# Patient Record
Sex: MALE | Race: WHITE | NOT HISPANIC OR LATINO | Employment: FULL TIME | URBAN - METROPOLITAN AREA
[De-identification: names, ages, dates, MRNs, and addresses within clinical notes are randomized per-mention and may not be internally consistent; named-entity substitution may affect disease eponyms.]

---

## 2017-03-28 ENCOUNTER — OFFICE VISIT (OUTPATIENT)
Dept: URGENT CARE | Facility: RETAIL CLINIC | Age: 37
End: 2017-03-28

## 2017-03-28 VITALS
TEMPERATURE: 97.7 F | DIASTOLIC BLOOD PRESSURE: 75 MMHG | SYSTOLIC BLOOD PRESSURE: 139 MMHG | HEART RATE: 74 BPM | OXYGEN SATURATION: 97 %

## 2017-03-28 DIAGNOSIS — R05.9 COUGH: ICD-10-CM

## 2017-03-28 DIAGNOSIS — J01.90 ACUTE SINUSITIS WITH COEXISTING CONDITION REQUIRING PROPHYLACTIC TREATMENT: ICD-10-CM

## 2017-03-28 DIAGNOSIS — J02.9 ACUTE PHARYNGITIS, UNSPECIFIED ETIOLOGY: Primary | ICD-10-CM

## 2017-03-28 LAB — S PYO AG THROAT QL IA.RAPID: NORMAL

## 2017-03-28 PROCEDURE — 87081 CULTURE SCREEN ONLY: CPT | Performed by: NURSE PRACTITIONER

## 2017-03-28 PROCEDURE — 87880 STREP A ASSAY W/OPTIC: CPT | Mod: QW | Performed by: NURSE PRACTITIONER

## 2017-03-28 PROCEDURE — 99203 OFFICE O/P NEW LOW 30 MIN: CPT | Performed by: NURSE PRACTITIONER

## 2017-03-28 RX ORDER — PREDNISONE 20 MG/1
20-40 TABLET ORAL DAILY
Qty: 10 TABLET | Refills: 0 | Status: SHIPPED | OUTPATIENT
Start: 2017-03-28 | End: 2017-04-02

## 2017-03-28 NOTE — NURSING NOTE
Chief Complaint   Patient presents with     Pharyngitis     x 5 days     Ear Problem     Nasal Congestion     Chills     Generalized Body Aches       Initial /75  Pulse 74  Temp 97.7  F (36.5  C) (Oral)  SpO2 97% Estimated body mass index is 52.22 kg/(m^2) as calculated from the following:    Height as of 5/16/12: 6' (1.829 m).    Weight as of 5/16/12: 385 lb (174.6 kg).  Medication Reconciliation: complete   Deana Neville

## 2017-03-28 NOTE — PROGRESS NOTES
"[facility] clinic note    SUBJECTIVE:  Dontrell Schulz is a 37 year old male who presents to [facility] clinic with chief complaint of sore throat.    Onset of symptoms was 5 day(s) ago.    Course of illness: gradual onset and worsening.    Severity moderate  Course of illness:  Current and Associated symptoms: chills, sweats, felt a little dizzy, nasal congestion, rhinorrhea, cough \"junk\", ear pain right, sore throat, facial pain/pressure, hoarse voice, headache, myalgias, malaise and poor sleep  Treatment measures tried at home include OTC meds.  Predisposing factors include tobacco abuse.    Current Outpatient Prescriptions   Medication     Pseudoephedrine-Naproxen Na (SINUS & COLD-D PO)     LISINOPRIL PO     AMLODIPINE BESYLATE PO     Acetaminophen (TYLENOL PO)     No current facility-administered medications for this visit.      PAST MEDICAL HISTORY:   Past Medical History:   Diagnosis Date     Hypertension        PAST SURGICAL HISTORY:   Past Surgical History:   Procedure Laterality Date     EYE SURGERY      trauma related - Retinal injury       FAMILY HISTORY: No family history on file.    SOCIAL HISTORY:   Social History   Substance Use Topics     Smoking status: Current Every Day Smoker     Packs/day: 1.00     Smokeless tobacco: Not on file     Alcohol use Yes      Comment: occas       ROS:  Review of systems negative except as stated above.    OBJECTIVE:   Vitals:    03/28/17 0934   BP: 139/75   Pulse: 74   Temp: 97.7  F (36.5  C)   TempSrc: Oral   SpO2: 97%     GENERAL APPEARANCE: alert, moderate distress, cooperative and over weight  EYES: EOMI,  PERRL, conjunctiva clear  HENT: ear canals and TM's mostly normal.  Nose congested.  Pharynx erythematous noted.  NECK: bilateral anterior cervical adenopathy  RESP: lungs clear to auscultation - no rales, rhonchi or wheezes  CV: regular rates and rhythm, normal S1 S2, no murmur noted  ABDOMEN:  soft, nontender, no " HSM or masses and bowel sounds normal  SKIN: no suspicious lesions or rashes    Rapid Strep test is negative; await throat culture results.    ASSESSMENT:     Acute pharyngitis, unspecified etiology  Acute sinusitis with coexisting condition requiring prophylactic treatment  Cough      PLAN:   Outpatient Encounter Prescriptions as of 3/28/2017   Medication Sig Dispense Refill     Pseudoephedrine-Naproxen Na (SINUS & COLD-D PO)        LISINOPRIL PO Take 40 mg by mouth daily.       AMLODIPINE BESYLATE PO Take 10 mg by mouth daily.       Acetaminophen (TYLENOL PO) Take 1,000 mg by mouth every 6 hours as needed Reported on 3/28/2017       [DISCONTINUED] IBUPROFEN PO Take 600 mg by mouth every 6 hours as needed.       [DISCONTINUED] HYDROcodone-acetaminophen (VICODIN) 5-500 MG per tablet Take 1-2 tablets by mouth every 6 hours as needed for pain. 20 tablet 0     No facility-administered encounter medications on file as of 3/28/2017.      If not improving Follow up at:  [facility] [phone]  Encourage good hydration (mainly water), may drink tea /c honey, warm chicken broth to sooth throat.  Soft foods may be preferred for several days.  Symptomatic treatment with warm Na+ H2O gargles, OTC analgesic, etc. discussed.   Strep culture pending.   Dontrell Schulz told positive cultures called only.  Rest as needed.  Follow-up with primary care provider if not improving.    If difficulty breathing or swallowing be seen immediately in the ED.    Pedro Valdez MSN, APRN, Family NP-C  [facility]

## 2017-03-28 NOTE — MR AVS SNAPSHOT
"              After Visit Summary   3/28/2017    Dontrell Schulz    MRN: [id number]           Patient Information     Date Of Birth          1980        Visit Information        Provider Department      3/28/2017 9:30 AM Pedro Valdez APRN CNP [facility]        Today's Diagnoses     Acute pharyngitis, unspecified etiology    -  1    Acute sinusitis with coexisting condition requiring prophylactic treatment        Cough           Follow-ups after your visit        Who to contact     You can reach your care team any time of the day by calling [phone].  Notification of test results:  If you have an abnormal lab result, we will notify you by phone as soon as possible.         Additional Information About Your Visit        MyChart Information     [employer] lets you send messages to your doctor, view your test results, renew your prescriptions, schedule appointments and more. To sign up, go to [email or web address] . Click on \"Log in\" on the left side of the screen, which will take you to the Welcome page. Then click on \"Sign up Now\" on the right side of the page.     You will be asked to enter the access code listed below, as well as some personal information. Please follow the directions to create your username and password.     Your access code is: [email or web address]  Expires: 2017 10:13 AM     Your access code will  in 90 days. If you need help or a new code, please call your [place] clinic or [phone].        Care EveryWhere ID     This is your [facility] EveryWhere ID. This could be used by other organizations to access your [place] medical records  [id number]        Your Vitals Were     Pulse Temperature Pulse Oximetry             74 97.7  F (36.5  C) (Oral) 97%          Blood Pressure from Last 3 Encounters:   17 139/75   12 148/85    Weight from Last 3 Encounters:   12 (!) 385 lb (174.6 kg)              We Performed the Following     BETA STREP GROUP " A R/O CULTURE     RAPID STREP SCREEN          Today's Medication Changes          These changes are accurate as of: 3/28/17 10:13 AM.  If you have any questions, ask your nurse or doctor.               Start taking these medicines.        Dose/Directions    amoxicillin-clavulanate 875-125 MG per tablet   Commonly known as:  AUGMENTIN   Used for:  Acute sinusitis with coexisting condition requiring prophylactic treatment   Started by:  Pedro Valdez APRN CNP        Dose:  1 tablet   Take 1 tablet by mouth 2 times daily for 10 days   Quantity:  20 tablet   Refills:  0       lidocaine 2 % solution   Commonly known as:  XYLOCAINE   Used for:  Acute pharyngitis, unspecified etiology   Started by:  Pedro Valdez APRN CNP        5 to 15 ml, At back of throat slowly swish and swallow or spit every 3-8 hours as needed; max 8 doses/24 hour period   Quantity:  100 mL   Refills:  0       predniSONE 20 MG tablet   Commonly known as:  DELTASONE   Used for:  Acute sinusitis with coexisting condition requiring prophylactic treatment, Cough   Started by:  Pedro Valdez APRN CNP        Dose:  20-40 mg   Take 1-2 tablets (20-40 mg) by mouth daily for 5 days   Quantity:  10 tablet   Refills:  0            Where to get your medicines      These medications were sent to [address]  [address] [facility] [address]     Phone:  [phone]     amoxicillin-clavulanate 875-125 MG per tablet    lidocaine 2 % solution    predniSONE 20 MG tablet                Primary Care Provider Office Phone #    [facility] [phone]       No address on file        Thank you!     Thank you for choosing [facility]  for your care. Our goal is always to provide you with excellent care. Hearing back from our patients is one way we can continue to improve our services. Please take a few minutes to complete the written survey that you may receive in the mail  after your visit with us. Thank you!             Your Updated Medication List - Protect others around you: Learn how to safely use, store and throw away your medicines at www.disposemymeds.org.          This list is accurate as of: 3/28/17 10:13 AM.  Always use your most recent med list.                   Brand Name Dispense Instructions for use    AMLODIPINE BESYLATE PO      Take 10 mg by mouth daily.       amoxicillin-clavulanate 875-125 MG per tablet    AUGMENTIN    20 tablet    Take 1 tablet by mouth 2 times daily for 10 days       lidocaine 2 % solution    XYLOCAINE    100 mL    5 to 15 ml, At back of throat slowly swish and swallow or spit every 3-8 hours as needed; max 8 doses/24 hour period       LISINOPRIL PO      Take 40 mg by mouth daily.       predniSONE 20 MG tablet    DELTASONE    10 tablet    Take 1-2 tablets (20-40 mg) by mouth daily for 5 days       SINUS & COLD-D PO          TYLENOL PO      Take 1,000 mg by mouth every 6 hours as needed Reported on 3/28/2017

## 2017-03-30 LAB — BETA STREP CONFIRM: NORMAL

## 2017-04-03 ENCOUNTER — TELEPHONE (OUTPATIENT)
Dept: URGENT CARE | Facility: RETAIL CLINIC | Age: 37
End: 2017-04-03

## 2017-04-03 NOTE — LETTER
[facility] [place]  [address] [facility] [address]  Phone: [phone]    April 3, 2017        Dontrell Schulz  [address] [facility] [address]          To whom it may concern:    RE: Dontrell Schulz    Patient was seen and treated 3/28/2017 at our clinic and missed work.    Please contact me for questions or concerns.      Sincerely,        Pedro Valdez MSN, APRN, Family NP-C  [facility]

## 2017-04-03 NOTE — TELEPHONE ENCOUNTER
Reason for Call:  Other Note for employer    Detailed comments: Please advise if patient would be able to  a note for employer that patient was seen in [facility], please advise patient    Phone Number Patient can be reached at: Home number on file [phone]    Best Time:     Can we leave a detailed message on this number? YES    Call taken on 4/3/2017 at 10:50 AM by Tanya Gomez

## 2017-10-12 ENCOUNTER — TELEPHONE (OUTPATIENT)
Dept: FAMILY MEDICINE | Facility: CLINIC | Age: 37
End: 2017-10-12

## 2017-10-12 ENCOUNTER — OFFICE VISIT (OUTPATIENT)
Dept: FAMILY MEDICINE | Facility: OTHER | Age: 37
End: 2017-10-12
Payer: COMMERCIAL

## 2017-10-12 ENCOUNTER — RADIANT APPOINTMENT (OUTPATIENT)
Dept: GENERAL RADIOLOGY | Facility: OTHER | Age: 37
End: 2017-10-12
Attending: PHYSICIAN ASSISTANT
Payer: COMMERCIAL

## 2017-10-12 VITALS
BODY MASS INDEX: 44.1 KG/M2 | HEART RATE: 80 BPM | RESPIRATION RATE: 16 BRPM | SYSTOLIC BLOOD PRESSURE: 130 MMHG | HEIGHT: 71 IN | WEIGHT: 315 LBS | TEMPERATURE: 98 F | OXYGEN SATURATION: 96 % | DIASTOLIC BLOOD PRESSURE: 60 MMHG

## 2017-10-12 DIAGNOSIS — G62.9 POLYNEUROPATHY: Primary | ICD-10-CM

## 2017-10-12 DIAGNOSIS — R20.2 PARESTHESIAS: ICD-10-CM

## 2017-10-12 LAB
BASOPHILS # BLD AUTO: 0 10E9/L (ref 0–0.2)
BASOPHILS NFR BLD AUTO: 0.3 %
CRP SERPL-MCNC: <2.9 MG/L (ref 0–8)
DIFFERENTIAL METHOD BLD: NORMAL
EOSINOPHIL # BLD AUTO: 0.4 10E9/L (ref 0–0.7)
EOSINOPHIL NFR BLD AUTO: 5.2 %
ERYTHROCYTE [DISTWIDTH] IN BLOOD BY AUTOMATED COUNT: 12.4 % (ref 10–15)
HBA1C MFR BLD: 5.2 % (ref 4.3–6)
HCT VFR BLD AUTO: 45.1 % (ref 40–53)
HGB BLD-MCNC: 15.5 G/DL (ref 13.3–17.7)
LYMPHOCYTES # BLD AUTO: 2 10E9/L (ref 0.8–5.3)
LYMPHOCYTES NFR BLD AUTO: 26.7 %
MCH RBC QN AUTO: 30.2 PG (ref 26.5–33)
MCHC RBC AUTO-ENTMCNC: 34.4 G/DL (ref 31.5–36.5)
MCV RBC AUTO: 88 FL (ref 78–100)
MONOCYTES # BLD AUTO: 0.6 10E9/L (ref 0–1.3)
MONOCYTES NFR BLD AUTO: 8 %
NEUTROPHILS # BLD AUTO: 4.5 10E9/L (ref 1.6–8.3)
NEUTROPHILS NFR BLD AUTO: 59.8 %
PLATELET # BLD AUTO: 276 10E9/L (ref 150–450)
RBC # BLD AUTO: 5.14 10E12/L (ref 4.4–5.9)
TSH SERPL DL<=0.005 MIU/L-ACNC: 0.6 MU/L (ref 0.4–4)
VIT B12 SERPL-MCNC: 402 PG/ML (ref 193–986)
WBC # BLD AUTO: 7.5 10E9/L (ref 4–11)

## 2017-10-12 PROCEDURE — 86140 C-REACTIVE PROTEIN: CPT | Performed by: PHYSICIAN ASSISTANT

## 2017-10-12 PROCEDURE — 36415 COLL VENOUS BLD VENIPUNCTURE: CPT | Performed by: PHYSICIAN ASSISTANT

## 2017-10-12 PROCEDURE — 99214 OFFICE O/P EST MOD 30 MIN: CPT | Performed by: PHYSICIAN ASSISTANT

## 2017-10-12 PROCEDURE — 82607 VITAMIN B-12: CPT | Performed by: PHYSICIAN ASSISTANT

## 2017-10-12 PROCEDURE — 83036 HEMOGLOBIN GLYCOSYLATED A1C: CPT | Performed by: PHYSICIAN ASSISTANT

## 2017-10-12 PROCEDURE — 85025 COMPLETE CBC W/AUTO DIFF WBC: CPT | Performed by: PHYSICIAN ASSISTANT

## 2017-10-12 PROCEDURE — 72100 X-RAY EXAM L-S SPINE 2/3 VWS: CPT

## 2017-10-12 PROCEDURE — 84443 ASSAY THYROID STIM HORMONE: CPT | Performed by: PHYSICIAN ASSISTANT

## 2017-10-12 ASSESSMENT — PAIN SCALES - GENERAL: PAINLEVEL: NO PAIN (0)

## 2017-10-12 NOTE — LETTER
October 16, 2017      Dontrell Schulz  [address] [facility] [address]        Dear ,    We are writing to inform you of your test results.    Lab work has returned and shows normal vitamin B12 level, normal thyroid function, normal inflammatory marker and blood cell counts.     Resulted Orders   Vitamin B12   Result Value Ref Range    Vitamin B12 402 193 - 986 pg/mL   CBC with platelets and differential   Result Value Ref Range    WBC 7.5 4.0 - 11.0 10e9/L    RBC Count 5.14 4.4 - 5.9 10e12/L    Hemoglobin 15.5 13.3 - 17.7 g/dL    Hematocrit 45.1 40.0 - 53.0 %    MCV 88 78 - 100 fl    MCH 30.2 26.5 - 33.0 pg    MCHC 34.4 31.5 - 36.5 g/dL    RDW 12.4 10.0 - 15.0 %    Platelet Count 276 150 - 450 10e9/L    Diff Method Automated Method     % Neutrophils 59.8 %    % Lymphocytes 26.7 %    % Monocytes 8.0 %    % Eosinophils 5.2 %    % Basophils 0.3 %    Absolute Neutrophil 4.5 1.6 - 8.3 10e9/L    Absolute Lymphocytes 2.0 0.8 - 5.3 10e9/L    Absolute Monocytes 0.6 0.0 - 1.3 10e9/L    Absolute Eosinophils 0.4 0.0 - 0.7 10e9/L    Absolute Basophils 0.0 0.0 - 0.2 10e9/L   CRP, inflammation   Result Value Ref Range    CRP Inflammation <2.9 0.0 - 8.0 mg/L   TSH with free T4 reflex   Result Value Ref Range    TSH 0.60 0.40 - 4.00 mU/L   Hemoglobin A1c   Result Value Ref Range    Hemoglobin A1C 5.2 4.3 - 6.0 %       If you have any questions or concerns, please call the clinic at the number listed above.       Sincerely,        Kathy Sarah PA-C

## 2017-10-12 NOTE — PATIENT INSTRUCTIONS
"I will contact you with lab and imaging results Monday. I want you to get MRI's of the low back and head to evaluate for cause of acute symptoms.     Paraesthesias  Paraesthesia is a burning or prickling sensation that is sometimes felt in the hands, arms, legs or feet. It can also occur in other parts of the body. It can also feel like tingling or numbness, skin crawling, or itching. The feeling is not comfortable, but it is not painful. (The \"pins and needles\" feeling that happens when a foot or hand \"falls asleep\" is a temporary paraesthesia.)  Paraesthesias that last or come and go may be caused by medical issues that need to be treated. These include stroke, a bulging disk pressing on a nerve, a trapped nerve, vitamin deficiencies, or even certain medicines.  Tests are often done. These tests may include blood tests, X-ray, CT (computerized tomography) scan, or a muscle test (electromyography). Depending on the cause, treatment may include physical therapy.  Home care    Tell the healthcare provider about all medicines you take. This includes prescription and over-the-counter medicines, vitamins, and herbs. Ask if any of the medicines may be causing your problems. Do not make any changes to prescription medicines without talking to your healthcare provider first.    You may be prescribed medicines to help relieve the tingling feeling or for pain. Take all medicines as directed.    A numb hand or foot may be more prone to injury. To help protect it:    Always use oven mitts.    Test water with an unaffected hand or foot.    Use caution when trimming nails. File sharp areas.    Wear shoes that fit well to avoid pressure points, blisters, and ulcers.    Inspect your hands and feet carefully (including the soles of your feet and between your toes) at least once a week. If you see red areas, sores, or other problems, tell your healthcare provider.  Follow-up care  Follow up with your doctor or as advised by our " staff. You may need further testing or evaluation.  When to seek medical advice  Call your healthcare provider right away if any of the following occur:    Numbness or weakness of the face, one arm, or one leg    Slurred speech, confusion, trouble speaking, walking, or seeing    Severe headache, fainting spell, dizziness, or seizure    Chest, arm, neck, or upper back pain    Loss of bladder or bowel control    Open wound with redness, swelling, or pus  Date Last Reviewed: 9/25/2015 2000-2017 [employer]. [address] [place] [address]. All rights reserved. This information is not intended as a substitute for professional medical care. Always follow your healthcare professional's instructions.

## 2017-10-12 NOTE — NURSING NOTE
"Chief Complaint   Patient presents with     Numbness     bilateral legs and feet, x 1 1/2 weeks       Initial /60 (BP Location: Left arm, Patient Position: Chair, Cuff Size: Adult Large)  Pulse 80  Temp 98  F (36.7  C) (Oral)  Resp 16  Ht 5' 11.25\" (1.81 m)  Wt (!) 321 lb 12.8 oz (146 kg)  SpO2 96%  BMI 44.57 kg/m2 Estimated body mass index is 44.57 kg/(m^2) as calculated from the following:    Height as of this encounter: 5' 11.25\" (1.81 m).    Weight as of this encounter: 321 lb 12.8 oz (146 kg).  Medication Reconciliation: complete       Myra WILSON LPN      "

## 2017-10-12 NOTE — LETTER
[facility]  [address] [facility] [address]  Phone: [phone]    October 12, 2017        Dontrell Schulz  [address] [facility] [address]          To whom it may concern:    RE: Dontrell Schulz    Patient was seen and treated today at our clinic.  Due to numbness and tingling to the legs at this time I will have him stay off work for further evaluation/work up.     Please contact me for questions or concerns.      Sincerely,        Kathy Sarah PA-C

## 2017-10-12 NOTE — TELEPHONE ENCOUNTER
Dontrell Schulz is a 37 year old male who calls with concerns about numbness.  Patient reports some numbness and tingling in the right foot last week.  He reports that he thought it was related to his back and has been seeing a chiropractor about 3 times since symptoms started.  He describes as a numbness and tingling, but then reports it just feels funny.  Patient reports that symptoms come and go, lasting various amounts of time.  He denies pain to the back or legs, no shooting pains down the legs.  Denies any lightheadedness, dizziness, headaches, or vision concerns.  Patient reports that he is walking well, denies any balance problems and has been able to work.  Denies bowel or bladder concerns.  Patient reports that he had been seeing Dr. Fareed Guardado in [place], however his insurance changed and he is no longer able to go there.  Current medications include Norvasc and Lisinopril.     NURSING ASSESSMENT:  Description:  Numbness and tingling to legs.   Onset/duration:  Started over a week ago.   Improves/worsens symptoms:  No change.   Pain scale (0-10)   0/10, denies pain.   Last exam/Treatment:  Not normally seen at [facility].   Allergies:   Allergies   Allergen Reactions     Nkda [No Known Drug Allergies]      NURSING PLAN: Appointment made.     RECOMMENDED DISPOSITION:  Patient already scheduled for patient at 2pm today.  Patient was encouraged to seek emergent care with any worsening of symptoms.  Keep appointment today.   Will comply with recommendation: Yes  If further questions/concerns or if symptoms do not improve, worsen or new symptoms develop, call your PCP or [facility] as soon as possible.    Guideline used:  Telephone Triage Protocols for Nurses, Fifth Edition, Magi Jalloh RN

## 2017-10-12 NOTE — MR AVS SNAPSHOT
"              After Visit Summary   10/12/2017    Dontrell Schulz    MRN: [id number]           Patient Information     Date Of Birth          1980        Visit Information        Provider Department      10/12/2017 2:00 PM Kathy Sarah PA-C [facility]        Today's Diagnoses     Polyneuropathy    -  1    Paresthesias          Care Instructions    I will contact you with lab and imaging results Monday. I want you to get MRI's of the low back and head to evaluate for cause of acute symptoms.     Paraesthesias  Paraesthesia is a burning or prickling sensation that is sometimes felt in the hands, arms, legs or feet. It can also occur in other parts of the body. It can also feel like tingling or numbness, skin crawling, or itching. The feeling is not comfortable, but it is not painful. (The \"pins and needles\" feeling that happens when a foot or hand \"falls asleep\" is a temporary paraesthesia.)  Paraesthesias that last or come and go may be caused by medical issues that need to be treated. These include stroke, a bulging disk pressing on a nerve, a trapped nerve, vitamin deficiencies, or even certain medicines.  Tests are often done. These tests may include blood tests, X-ray, CT (computerized tomography) scan, or a muscle test (electromyography). Depending on the cause, treatment may include physical therapy.  Home care    Tell the healthcare provider about all medicines you take. This includes prescription and over-the-counter medicines, vitamins, and herbs. Ask if any of the medicines may be causing your problems. Do not make any changes to prescription medicines without talking to your healthcare provider first.    You may be prescribed medicines to help relieve the tingling feeling or for pain. Take all medicines as directed.    A numb hand or foot may be more prone to injury. To help protect it:    Always use oven mitts.    Test water with an unaffected hand or foot.    Use caution when " trimming nails. File sharp areas.    Wear shoes that fit well to avoid pressure points, blisters, and ulcers.    Inspect your hands and feet carefully (including the soles of your feet and between your toes) at least once a week. If you see red areas, sores, or other problems, tell your healthcare provider.  Follow-up care  Follow up with your doctor or as advised by our staff. You may need further testing or evaluation.  When to seek medical advice  Call your healthcare provider right away if any of the following occur:    Numbness or weakness of the face, one arm, or one leg    Slurred speech, confusion, trouble speaking, walking, or seeing    Severe headache, fainting spell, dizziness, or seizure    Chest, arm, neck, or upper back pain    Loss of bladder or bowel control    Open wound with redness, swelling, or pus  Date Last Reviewed: 9/25/2015 2000-2017 [employer]. [address] [place] [address]. All rights reserved. This information is not intended as a substitute for professional medical care. Always follow your healthcare professional's instructions.                Follow-ups after your visit        Your next 10 appointments already scheduled     Oct 14, 2017 10:00 AM CDT   MR BRAIN W/O & W CONTRAST with PHMR1   [facility] MRI ([facility])    [address] [place] [id number] [phone]           Take your medicines as usual, unless your doctor tells you not to. Bring a list of your current medicines to your exam (including vitamins, minerals and over-the-counter drugs).  You will be given intravenous contrast for this exam. To prepare:   The day before your exam, drink extra fluids at least six 8-ounce glasses (unless your doctor tells you to restrict your fluids).   Have a blood test (creatinine test) within 30 days of your exam. Go to your clinic or Diagnostic Imaging Department for this test.  The MRI machine uses a strong magnet.  Please wear clothes without metal (snaps, zippers). A sweatsuit works well, or we may give you a hospital gown.  Please remove any body piercings and hair extensions before you arrive. You will also remove watches, jewelry, hairpins, wallets, dentures, partial dental plates and hearing aids. You may wear contact lenses, and you may be able to wear your rings. We have a safe place to keep your personal items, but it is safer to leave them at home.   **IMPORTANT** THE INSTRUCTIONS BELOW ARE ONLY FOR THOSE PATIENTS WHO HAVE BEEN TOLD THEY WILL RECEIVE SEDATION OR GENERAL ANESTHESIA DURING THEIR MRI PROCEDURE:  IF YOU WILL RECEIVE SEDATION (take medicine to help you relax during your exam):   You must get the medicine from your doctor before you arrive. Bring the medicine to the exam. Do not take it at home.   Arrive one hour early. Bring someone who can take you home after the test. Your medicine will make you sleepy. After the exam, you may not drive, take a bus or take a taxi by yourself.   No eating 8 hours before your exam. You may have clear liquids up until 4 hours before your exam. (Clear liquids include water, clear tea, black coffee and fruit juice without pulp.)  IF YOU WILL RECEIVE ANESTHESIA (be asleep for your exam):   Arrive 1 1/2 hours early. Bring someone who can take you home after the test. You may not drive, take a bus or take a taxi by yourself.   No eating 8 hours before your exam. You may have clear liquids up until 4 hours before your exam. (Clear liquids include water, clear tea, black coffee and fruit juice without pulp.)  Please call the Imaging Department at your exam site with any questions.            Oct 14, 2017 11:00 AM CDT   MR LUMBAR SPINE W/O CONTRAST with PHMR1   [facility] MRI ([facility])    [address] [facility] [address] [phone]           Take your medicines as usual, unless your doctor tells you not to. Bring a list of your current  medicines to your exam (including vitamins, minerals and over-the-counter drugs). Also bring the results of similar scans you may have had.  Please remove any body piercings and hair extensions before you arrive.  Follow your doctor s orders. If you do not, we may have to postpone your exam.  You will not have contrast for this exam. You do not need to do anything special to prepare.  The MRI machine uses a strong magnet. Please wear clothes without metal (snaps, zippers). A sweatsuit works well, or we may give you a hospital gown.   **IMPORTANT** THE INSTRUCTIONS BELOW ARE ONLY FOR THOSE PATIENTS WHO HAVE BEEN TOLD THEY WILL RECEIVE SEDATION OR GENERAL ANESTHESIA DURING THEIR MRI PROCEDURE:  IF YOU WILL RECEIVE SEDATION (take medicine to help you relax during your exam):   You must get the medicine from your doctor before you arrive. Bring the medicine to the exam. Do not take it at home.   Arrive one hour early. Bring someone who can take you home after the test. Your medicine will make you sleepy. After the exam, you may not drive, take a bus or take a taxi by yourself.   No eating 8 hours before your exam. You may have clear liquids up until 4 hours before your exam. (Clear liquids include water, clear tea, black coffee and fruit juice without pulp.)  IF YOU WILL RECEIVE ANESTHESIA (be asleep for your exam):   Arrive 1 1/2 hours early. Bring someone who can take you home after the test. You may not drive, take a bus or take a taxi by yourself.   No eating 8 hours before your exam. You may have clear liquids up until 4 hours before your exam. (Clear liquids include water, clear tea, black coffee and fruit juice without pulp.)   You will spend four to five hours in the recovery room.  Please call the Imaging Department at your exam site with any questions.              Future tests that were ordered for you today     Open Future Orders        Priority Expected Expires Ordered    MR Lumbar Spine w/o Contrast  "Routine  10/12/2018 10/12/2017    MR Brain w/o & w Contrast Routine  10/12/2018 10/12/2017            Who to contact     If you have questions or need follow up information about today's clinic visit or your schedule please contact [facility] directly at [phone].  Normal or non-critical lab and imaging results will be communicated to you by MyChart, letter or phone within 4 business days after the clinic has received the results. If you do not hear from us within 7 days, please contact the clinic through [employer] or phone. If you have a critical or abnormal lab result, we will notify you by phone as soon as possible.  Submit refill requests through [email or web address] or call your pharmacy and they will forward the refill request to us. Please allow 3 business days for your refill to be completed.          Additional Information About Your Visit        MyChart Information     [email or web address] lets you send messages to your doctor, view your test results, renew your prescriptions, schedule appointments and more. To sign up, go to [email or web address] . Click on \"Log in\" on the left side of the screen, which will take you to the Welcome page. Then click on \"Sign up Now\" on the right side of the page.     You will be asked to enter the access code listed below, as well as some personal information. Please follow the directions to create your username and password.     Your access code is: [email or web address]  Expires: 1/10/2018  3:01 PM     Your access code will  in 90 days. If you need help or a new code, please call your [facility] or [phone].        Care EveryWhere ID     This is your Care EveryWhere ID. This could be used by other organizations to access your [facility] medical records  [id number]        Your Vitals Were     Pulse Temperature Respirations Height Pulse Oximetry BMI (Body Mass Index)    80 98  F (36.7  C) (Oral) 16 5' 11.25\" (1.81 m) 96% 44.57 kg/m2       Blood Pressure from Last 3 " Encounters:   10/12/17 130/60   03/28/17 139/75   05/16/12 148/85    Weight from Last 3 Encounters:   10/12/17 (!) 321 lb 12.8 oz (146 kg)   05/16/12 (!) 385 lb (174.6 kg)              We Performed the Following     CBC with platelets and differential     CRP, inflammation     Hemoglobin A1c     TSH with free T4 reflex     Vitamin B12        Primary Care Provider Office Phone # Fax #    [facility] [phone]       [address] [facility] [address]        Equal Access to Services     JUAN BAEZ : Hadii aad ku hadasho Soomaali, waaxda luqadaha, qaybta kaalmada aderose mary, amarjit calvert. So [facility] [phone].    ATENCIÓN: Si habla español, tiene a burton disposición servicios gratuitos de asistencia lingüística. Llame al [phone].    We comply with applicable federal civil rights laws and [place] laws. We do not discriminate on the basis of race, color, national origin, age, disability, sex, sexual orientation, or gender identity.            Thank you!     Thank you for choosing [facility]  for your care. Our goal is always to provide you with excellent care. Hearing back from our patients is one way we can continue to improve our services. Please take a few minutes to complete the written survey that you may receive in the mail after your visit with us. Thank you!             Your Updated Medication List - Protect others around you: Learn how to safely use, store and throw away your medicines at www.disposemymeds.org.          This list is accurate as of: 10/12/17  3:02 PM.  Always use your most recent med list.                   Brand Name Dispense Instructions for use Diagnosis    AMLODIPINE BESYLATE PO      Take 10 mg by mouth daily.        LISINOPRIL PO      Take 40 mg by mouth daily.        SINUS & COLD-D PO           TYLENOL PO      Take 1,000 mg by mouth every 6 hours as needed Reported on 3/28/2017

## 2017-10-12 NOTE — PROGRESS NOTES
SUBJECTIVE:   Dontrell Schulz is a 37 year old male who presents to clinic today for the following health issues:      Concern - numbness in bilateral feet and legs  Onset: 1 1/2 weeks    Description:   numbness    Intensity: 0/10    Progression of Symptoms:  worsening    Accompanying Signs & Symptoms:  Cold feet    Previous history of similar problem:   no    Precipitating factors:   Worsened by: sitting     Alleviating factors:  Improved by: nothing    Therapies Tried and outcome: Chiropractor, hot tub; no change    Patient is here today in clinic with his wife with concern about acute onset of numbness and tingling to his legs. He says that about a week and a half ago he began having numbness and tingling to the right foot and that since then symptoms have progressed all the way to his waist. He denies any loss of bowel or bladder but does feel numb and tingly and states that using the restroom feels off, he is able to achieve an erection but it does not feel normal. He denies any pain to the legs, denies any weakness to the legs, he does have varicose veins but has not had leg swelling,skin changes or rash. He denies any balance issues, denies any headaches, vision changes or neck of back pain. He has been told in the past that his blood sugar was borderline and is questioning diabetes as the cause of his symptoms. He reports that symptoms seem to be worse when sitting. He did get a flu shot in September, he denies any changes in medication, denies any fevers, chills or other sick symptoms. He has not had any unintentional loss of weight, has not had any abdominal symptoms. Does not have muscle pain or weakness to the legs.     -------------------------------------    Problem list and histories reviewed & adjusted, as indicated.  Additional history: as documented    BP Readings from Last 3 Encounters:   10/12/17 130/60   03/28/17 139/75   05/16/12 148/85    Wt Readings from Last 3 Encounters:   10/12/17 (!)  "321 lb 12.8 oz (146 kg)   05/16/12 (!) 385 lb (174.6 kg)        Reviewed and updated as needed this visit by clinical staffTobacco  Allergies  Med Hx  Surg Hx  Fam Hx  Soc Hx      Reviewed and updated as needed this visit by Provider       ROS:  Constitutional, HEENT, cardiovascular, pulmonary, gi and gu systems are negative, except as otherwise noted.      OBJECTIVE:   /60 (BP Location: Left arm, Patient Position: Chair, Cuff Size: Adult Large)  Pulse 80  Temp 98  F (36.7  C) (Oral)  Resp 16  Ht 5' 11.25\" (1.81 m)  Wt (!) 321 lb 12.8 oz (146 kg)  SpO2 96%  BMI 44.57 kg/m2  Body mass index is 44.57 kg/(m^2).  GENERAL: alert, no distress and obese  EYES: Right eye is altered due to previous injury left eye PERRL, EOMI  NECK: no adenopathy, no asymmetry, masses, or scars and thyroid normal to palpation  RESP: lungs clear to auscultation - no rales, rhonchi or wheezes  CV: regular rates and rhythm, peripheral pulses strong and no peripheral edema  ABDOMEN: soft, nontender, no hepatosplenomegaly, no masses and bowel sounds normal  MS: extremities normal- no gross deformities noted  SKIN: varicose veins no rashes or changes  NEURO: Normal strength and tone, sensory deficit lower extremities, mentation intact, speech normal and cranial nerves 2-12 intact  BACK: no CVA tenderness, no paralumbar tenderness    Diagnostic Test Results:  Results for orders placed or performed in visit on 10/12/17 (from the past 24 hour(s))   Hemoglobin A1c   Result Value Ref Range    Hemoglobin A1C 5.2 4.3 - 6.0 %     Labs pending  Xray pending  MRI scheduled for Saturday.     ASSESSMENT/PLAN:       ICD-10-CM    1. Polyneuropathy G62.9 MR Lumbar Spine w/o Contrast   2. Paresthesias R20.2 Vitamin B12     CBC with platelets and differential     CRP, inflammation     TSH with free T4 reflex     XR Lumbar Spine 2/3 Views     Hemoglobin A1c     MR Brain w/o & w Contrast     MR Lumbar Spine w/o Contrast       I discussed possible " causes of paresthesias with patient and his wife. Due to acute onset of ascending symptoms I will get further imaging with MRI of the lumbar spine and head and will follow up with results and further recommendations/neurology referral next week. I did review red flag symptoms and the need to go to the ER if any acute concerns develop.   See Patient Instructions    Kathy Sarah PA-C  [facility]

## 2017-10-14 ENCOUNTER — HOSPITAL ENCOUNTER (OUTPATIENT)
Dept: MRI IMAGING | Facility: CLINIC | Age: 37
Discharge: HOME | End: 2017-10-14
Attending: PHYSICIAN ASSISTANT | Admitting: PHYSICIAN ASSISTANT
Payer: COMMERCIAL

## 2017-10-14 ENCOUNTER — HOSPITAL ENCOUNTER (OUTPATIENT)
Dept: MRI IMAGING | Facility: CLINIC | Age: 37
End: 2017-10-14
Attending: PHYSICIAN ASSISTANT
Payer: COMMERCIAL

## 2017-10-14 DIAGNOSIS — R20.2 PARESTHESIAS: ICD-10-CM

## 2017-10-14 DIAGNOSIS — G62.9 POLYNEUROPATHY: ICD-10-CM

## 2017-10-14 PROCEDURE — 72148 MRI LUMBAR SPINE W/O DYE: CPT

## 2017-10-14 PROCEDURE — 25000128 H RX IP 250 OP 636: Performed by: PHYSICIAN ASSISTANT

## 2017-10-14 PROCEDURE — 70553 MRI BRAIN STEM W/O & W/DYE: CPT

## 2017-10-14 PROCEDURE — A9585 GADOBUTROL INJECTION: HCPCS | Performed by: PHYSICIAN ASSISTANT

## 2017-10-14 RX ORDER — GADOBUTROL 604.72 MG/ML
10 INJECTION INTRAVENOUS ONCE
Status: COMPLETED | OUTPATIENT
Start: 2017-10-14 | End: 2017-10-14

## 2017-10-14 RX ADMIN — GADOBUTROL 10 ML: 604.72 INJECTION INTRAVENOUS at 11:02

## 2017-10-16 ENCOUNTER — TELEPHONE (OUTPATIENT)
Dept: FAMILY MEDICINE | Facility: OTHER | Age: 37
End: 2017-10-16

## 2017-10-16 NOTE — TELEPHONE ENCOUNTER
I called and discussed results with patient. Per radiology recommendation I will have him get a thoracic MRI and will refer him to spine for further evaluation of paresthesias. I did review red flag symptoms such as loss of bowel or bladder control and the need for him to go to the ER if acute worsening of symptoms. Due to the lack of feeling to the feet/legs I will have him stay off work until we can further evaluate cause of acute onset symptoms.

## 2017-10-16 NOTE — TELEPHONE ENCOUNTER
Patient called and I relayed the result message located under the imaging section from Kathy Perdue. He is requesting a call back from Kathy Perdue to discuss what was found on the MRI and if he is able to go to work or not. Please call the patient back at your earliest convenience. He also states if his wife Brigitte calls regarding the MRI it is okay to share the information with her. He can be reached at [phone]. Please advise.     Thank you  Terry Stuart   Patient Representative

## 2017-10-16 NOTE — TELEPHONE ENCOUNTER
Wife is calling again.  I do not see a consent to communicate on file.  Please call the patient and discuss the results.

## 2017-10-16 NOTE — TELEPHONE ENCOUNTER
Reason for Call:  Request for results:    Name of test or procedure: MRI    Date of test of procedure: 10/14    Location of the test or procedure: [facility]    OK to leave the result message on voice mail or with a family member? YES    Phone number Patient can be reached at:  Cell phone on file: [phone]    Additional comments: Patient's wife called and is requesting if someone can call Dontrell with the results from his MRI he had completed on Saturday 10/14. Please advise.     Call taken on 10/16/2017 at 11:32 AM by Terry Stuart

## 2017-10-17 ENCOUNTER — HOSPITAL ENCOUNTER (OUTPATIENT)
Dept: MRI IMAGING | Facility: CLINIC | Age: 37
Discharge: HOME | End: 2017-10-17
Attending: PHYSICIAN ASSISTANT | Admitting: PHYSICIAN ASSISTANT
Payer: COMMERCIAL

## 2017-10-17 DIAGNOSIS — R20.2 PARESTHESIAS: ICD-10-CM

## 2017-10-17 DIAGNOSIS — G62.9 POLYNEUROPATHY: ICD-10-CM

## 2017-10-17 PROCEDURE — A9585 GADOBUTROL INJECTION: HCPCS | Performed by: RADIOLOGY

## 2017-10-17 PROCEDURE — 25000128 H RX IP 250 OP 636: Performed by: RADIOLOGY

## 2017-10-17 PROCEDURE — 72157 MRI CHEST SPINE W/O & W/DYE: CPT

## 2017-10-17 RX ORDER — GADOBUTROL 604.72 MG/ML
10 INJECTION INTRAVENOUS ONCE
Status: COMPLETED | OUTPATIENT
Start: 2017-10-17 | End: 2017-10-17

## 2017-10-17 RX ADMIN — GADOBUTROL 10 ML: 604.72 INJECTION INTRAVENOUS at 17:44

## 2017-10-19 DIAGNOSIS — G62.9 POLYNEUROPATHY: Primary | ICD-10-CM

## 2017-10-19 DIAGNOSIS — R20.2 PARESTHESIA: ICD-10-CM

## 2017-10-19 LAB — RAD FLAG-ADDENDUM: ABNORMAL

## 2017-10-19 RX ORDER — PREDNISONE 20 MG/1
60 TABLET ORAL DAILY
Qty: 30 TABLET | Refills: 0 | Status: SHIPPED | OUTPATIENT
Start: 2017-10-19 | End: 2017-10-30

## 2017-10-30 ENCOUNTER — APPOINTMENT (OUTPATIENT)
Dept: MRI IMAGING | Facility: CLINIC | Age: 37
DRG: 098 | End: 2017-10-30
Attending: PSYCHIATRY & NEUROLOGY
Payer: COMMERCIAL

## 2017-10-30 ENCOUNTER — HOSPITAL ENCOUNTER (INPATIENT)
Facility: CLINIC | Age: 37
LOS: 1 days | Discharge: HOME | DRG: 098 | End: 2017-10-31
Attending: EMERGENCY MEDICINE | Admitting: HOSPITALIST
Payer: COMMERCIAL

## 2017-10-30 ENCOUNTER — OFFICE VISIT (OUTPATIENT)
Dept: NEUROSURGERY | Facility: CLINIC | Age: 37
End: 2017-10-30
Payer: COMMERCIAL

## 2017-10-30 VITALS — WEIGHT: 315 LBS | HEIGHT: 71 IN | BODY MASS INDEX: 44.1 KG/M2 | TEMPERATURE: 97.8 F

## 2017-10-30 DIAGNOSIS — G37.3 TRANSVERSE MYELITIS (H): Primary | ICD-10-CM

## 2017-10-30 DIAGNOSIS — R20.0 LOWER EXTREMITY NUMBNESS: Primary | ICD-10-CM

## 2017-10-30 DIAGNOSIS — R20.2 PARESTHESIA OF BOTH LEGS: ICD-10-CM

## 2017-10-30 PROBLEM — R20.9 DISTURBANCE OF SKIN SENSATION: Status: ACTIVE | Noted: 2017-10-30

## 2017-10-30 LAB
ANION GAP SERPL CALCULATED.3IONS-SCNC: 4 MMOL/L (ref 3–14)
BASOPHILS # BLD AUTO: 0 10E9/L (ref 0–0.2)
BASOPHILS NFR BLD AUTO: 0.3 %
BUN SERPL-MCNC: 21 MG/DL (ref 7–30)
CALCIUM SERPL-MCNC: 10 MG/DL (ref 8.5–10.1)
CHLORIDE SERPL-SCNC: 110 MMOL/L (ref 94–109)
CO2 SERPL-SCNC: 28 MMOL/L (ref 20–32)
CREAT SERPL-MCNC: 0.75 MG/DL (ref 0.66–1.25)
CRP SERPL-MCNC: <2.9 MG/L (ref 0–8)
DIFFERENTIAL METHOD BLD: NORMAL
EOSINOPHIL # BLD AUTO: 0.1 10E9/L (ref 0–0.7)
EOSINOPHIL NFR BLD AUTO: 2 %
ERYTHROCYTE [DISTWIDTH] IN BLOOD BY AUTOMATED COUNT: 13.1 % (ref 10–15)
ERYTHROCYTE [SEDIMENTATION RATE] IN BLOOD BY WESTERGREN METHOD: 6 MM/H (ref 0–15)
GFR SERPL CREATININE-BSD FRML MDRD: >90 ML/MIN/1.7M2
GLUCOSE SERPL-MCNC: 94 MG/DL (ref 70–99)
HCT VFR BLD AUTO: 43.1 % (ref 40–53)
HGB BLD-MCNC: 14.9 G/DL (ref 13.3–17.7)
IMM GRANULOCYTES # BLD: 0 10E9/L (ref 0–0.4)
IMM GRANULOCYTES NFR BLD: 0.4 %
LYMPHOCYTES # BLD AUTO: 2.4 10E9/L (ref 0.8–5.3)
LYMPHOCYTES NFR BLD AUTO: 34.1 %
MCH RBC QN AUTO: 31.3 PG (ref 26.5–33)
MCHC RBC AUTO-ENTMCNC: 34.6 G/DL (ref 31.5–36.5)
MCV RBC AUTO: 91 FL (ref 78–100)
MONOCYTES # BLD AUTO: 0.5 10E9/L (ref 0–1.3)
MONOCYTES NFR BLD AUTO: 6.8 %
NEUTROPHILS # BLD AUTO: 4 10E9/L (ref 1.6–8.3)
NEUTROPHILS NFR BLD AUTO: 56.4 %
NRBC # BLD AUTO: 0 10*3/UL
NRBC BLD AUTO-RTO: 0 /100
PLATELET # BLD AUTO: 255 10E9/L (ref 150–450)
POTASSIUM SERPL-SCNC: 4 MMOL/L (ref 3.4–5.3)
RBC # BLD AUTO: 4.76 10E12/L (ref 4.4–5.9)
SODIUM SERPL-SCNC: 142 MMOL/L (ref 133–144)
WBC # BLD AUTO: 7.1 10E9/L (ref 4–11)

## 2017-10-30 PROCEDURE — 85652 RBC SED RATE AUTOMATED: CPT | Performed by: EMERGENCY MEDICINE

## 2017-10-30 PROCEDURE — 25000128 H RX IP 250 OP 636: Performed by: PSYCHIATRY & NEUROLOGY

## 2017-10-30 PROCEDURE — A9585 GADOBUTROL INJECTION: HCPCS | Performed by: HOSPITALIST

## 2017-10-30 PROCEDURE — 86038 ANTINUCLEAR ANTIBODIES: CPT | Performed by: PSYCHIATRY & NEUROLOGY

## 2017-10-30 PROCEDURE — 99204 OFFICE O/P NEW MOD 45 MIN: CPT | Performed by: PHYSICIAN ASSISTANT

## 2017-10-30 PROCEDURE — 99222 1ST HOSP IP/OBS MODERATE 55: CPT | Mod: AI | Performed by: HOSPITALIST

## 2017-10-30 PROCEDURE — 85025 COMPLETE CBC W/AUTO DIFF WBC: CPT | Performed by: EMERGENCY MEDICINE

## 2017-10-30 PROCEDURE — 86140 C-REACTIVE PROTEIN: CPT | Performed by: EMERGENCY MEDICINE

## 2017-10-30 PROCEDURE — 99285 EMERGENCY DEPT VISIT HI MDM: CPT | Mod: 25

## 2017-10-30 PROCEDURE — 36415 COLL VENOUS BLD VENIPUNCTURE: CPT | Performed by: PSYCHIATRY & NEUROLOGY

## 2017-10-30 PROCEDURE — 80048 BASIC METABOLIC PNL TOTAL CA: CPT | Performed by: EMERGENCY MEDICINE

## 2017-10-30 PROCEDURE — 12000000 ZZH R&B MED SURG/OB

## 2017-10-30 PROCEDURE — 72156 MRI NECK SPINE W/O & W/DYE: CPT

## 2017-10-30 PROCEDURE — 25000128 H RX IP 250 OP 636: Performed by: HOSPITALIST

## 2017-10-30 RX ORDER — LORAZEPAM 2 MG/ML
1 INJECTION INTRAMUSCULAR
Status: DISCONTINUED | OUTPATIENT
Start: 2017-10-30 | End: 2017-10-31 | Stop reason: HOSPADM

## 2017-10-30 RX ORDER — ONDANSETRON 4 MG/1
4 TABLET, ORALLY DISINTEGRATING ORAL EVERY 6 HOURS PRN
Status: DISCONTINUED | OUTPATIENT
Start: 2017-10-30 | End: 2017-10-31 | Stop reason: HOSPADM

## 2017-10-30 RX ORDER — ONDANSETRON 2 MG/ML
4 INJECTION INTRAMUSCULAR; INTRAVENOUS EVERY 6 HOURS PRN
Status: DISCONTINUED | OUTPATIENT
Start: 2017-10-30 | End: 2017-10-31 | Stop reason: HOSPADM

## 2017-10-30 RX ORDER — HYDRALAZINE HYDROCHLORIDE 20 MG/ML
10 INJECTION INTRAMUSCULAR; INTRAVENOUS EVERY 4 HOURS PRN
Status: DISCONTINUED | OUTPATIENT
Start: 2017-10-30 | End: 2017-10-31 | Stop reason: HOSPADM

## 2017-10-30 RX ORDER — ACETAMINOPHEN 325 MG/1
650 TABLET ORAL EVERY 4 HOURS PRN
Status: DISCONTINUED | OUTPATIENT
Start: 2017-10-30 | End: 2017-10-31 | Stop reason: HOSPADM

## 2017-10-30 RX ORDER — PROCHLORPERAZINE MALEATE 5 MG
10 TABLET ORAL EVERY 6 HOURS PRN
Status: DISCONTINUED | OUTPATIENT
Start: 2017-10-30 | End: 2017-10-31 | Stop reason: HOSPADM

## 2017-10-30 RX ORDER — BISACODYL 10 MG
10 SUPPOSITORY, RECTAL RECTAL DAILY PRN
Status: DISCONTINUED | OUTPATIENT
Start: 2017-10-30 | End: 2017-10-31 | Stop reason: HOSPADM

## 2017-10-30 RX ORDER — PROCHLORPERAZINE 25 MG
25 SUPPOSITORY, RECTAL RECTAL EVERY 12 HOURS PRN
Status: DISCONTINUED | OUTPATIENT
Start: 2017-10-30 | End: 2017-10-31 | Stop reason: HOSPADM

## 2017-10-30 RX ORDER — AMLODIPINE BESYLATE 10 MG/1
10 TABLET ORAL DAILY
Status: DISCONTINUED | OUTPATIENT
Start: 2017-10-31 | End: 2017-10-31 | Stop reason: HOSPADM

## 2017-10-30 RX ORDER — NALOXONE HYDROCHLORIDE 0.4 MG/ML
.1-.4 INJECTION, SOLUTION INTRAMUSCULAR; INTRAVENOUS; SUBCUTANEOUS
Status: DISCONTINUED | OUTPATIENT
Start: 2017-10-30 | End: 2017-10-31 | Stop reason: HOSPADM

## 2017-10-30 RX ORDER — AMOXICILLIN 250 MG
1-2 CAPSULE ORAL 2 TIMES DAILY PRN
Status: DISCONTINUED | OUTPATIENT
Start: 2017-10-30 | End: 2017-10-31 | Stop reason: HOSPADM

## 2017-10-30 RX ORDER — GADOBUTROL 604.72 MG/ML
14 INJECTION INTRAVENOUS ONCE
Status: COMPLETED | OUTPATIENT
Start: 2017-10-30 | End: 2017-10-30

## 2017-10-30 RX ORDER — LISINOPRIL 40 MG/1
40 TABLET ORAL DAILY
Status: DISCONTINUED | OUTPATIENT
Start: 2017-10-31 | End: 2017-10-31 | Stop reason: HOSPADM

## 2017-10-30 RX ADMIN — GADOBUTROL 14 ML: 604.72 INJECTION INTRAVENOUS at 20:54

## 2017-10-30 RX ADMIN — SODIUM CHLORIDE 1000 MG: 9 INJECTION, SOLUTION INTRAVENOUS at 17:37

## 2017-10-30 ASSESSMENT — ENCOUNTER SYMPTOMS
NUMBNESS: 1
WEAKNESS: 0
HEADACHES: 0

## 2017-10-30 ASSESSMENT — ACTIVITIES OF DAILY LIVING (ADL)
FALL_HISTORY_WITHIN_LAST_SIX_MONTHS: NO
TOILETING: 0-->INDEPENDENT
SWALLOWING: 0-->SWALLOWS FOODS/LIQUIDS WITHOUT DIFFICULTY
DRESS: 0-->INDEPENDENT
COGNITION: 0 - NO COGNITION ISSUES REPORTED
ADLS_ACUITY_SCORE: 11
AMBULATION: 0-->INDEPENDENT
ADLS_ACUITY_SCORE: 15
RETIRED_COMMUNICATION: 0-->UNDERSTANDS/COMMUNICATES WITHOUT DIFFICULTY
BATHING: 0-->INDEPENDENT
RETIRED_EATING: 0-->INDEPENDENT
TRANSFERRING: 0-->INDEPENDENT

## 2017-10-30 ASSESSMENT — PAIN SCALES - GENERAL: PAINLEVEL: MODERATE PAIN (5)

## 2017-10-30 NOTE — ED PROVIDER NOTES
History     Chief Complaint:  Numbness    HPI   Dontrell Schulz is a 37 year old male who presents with numbness. The patient reports that he has been feeling a numb sensation in his lower extremities from the waist down for approximately one month. This began in his foot with a sensation that it had fallen asleep but this has gradually spread up to his waist. He is able to move his body normally and has some sensation but describes the numbness as a decreased sensation. The patient has control over his bowel and bladder but notes that there is less sensation of urgency up until just before having to go. The patient is still able to have an erection but denies really having any sensation in his penis. He denies having any history of IV drug use or frequent IV placement. There is no personal or family history of diabetes or autoimmune disorders.     The patient was seen by neurology this morning and had several imaging scans performed. He was discharged home but shortly after his physician called back and said that he should go to the emergency department for further evaluation.     MR Thoracic spine w & w/o contrast 10/17/17:  IMPRESSION:    1. Abnormal T2 hyperintense signal within the thoracic spinal cord at  the level of T4. No definite associated contrast enhancement. This is  nonspecific, but may be related to transverse myelitis or possibly  demyelination. This is only seen on the sagittal images, the patient  is requested to be brought back for axial images through this region.  2. Probable small central syrinx within the cervical spinal cord from  T8 to T9. No other abnormal signal or enhancement within the spinal  cord.  3. Mild degenerative changes in the mid thoracic spine with a small  central disc protrusion at T7-T8. No significant spinal canal or  neural foraminal narrowing.  4. Degenerative changes at the right T9 costovertebral junction with  associated bone marrow edema.    MR brain w & w/o  contrast 10/14/17:  IMPRESSION: Normal MRI of the brain.  Because the symptoms began as  high as the patient's waist, thoracic spine MRI with attention to the  spinal cord is suggested for further evaluation.    MR Lumbar spine without contrast 10/14/17:  IMPRESSION:    1. L3-L4 central left posterior paramedian disc herniation causing  mild impression on the thecal sac and on the origin of the left L4  nerve root.  2. Mild degenerative disc disease L3-L4, L4-L5 and L5-S1.    XR lumbar spine 10/12/17  IMPRESSION: There are 5 lumbar type vertebrae. There is mild  degenerative disc disease at L3-4 and L4-5. No evidence of fracture or  malalignment.    Allergies:  No Known Drug Allergies     Medications:    Lisinopril  Amlodipine    Past Medical History:    Hypertension    Past Surgical History:    Eye surgery    Family History:    The patient denies any relevant family medical history.     Social History:  The patient was accompanied to the ED by his wife.  Smoking Status: Yes  Smokeless Tobacco: No  Alcohol Use: Yes   Marital Status:   [2]    Review of Systems   Genitourinary: Negative for enuresis.   Neurological: Positive for numbness. Negative for weakness and headaches.   All other systems reviewed and are negative.    Physical Exam   Vitals:  Patient Vitals for the past 24 hrs:   BP Temp Temp src Pulse Resp SpO2 Height Weight   10/30/17 1608 145/73 97.7  F (36.5  C) Oral 61 16 94 % - -   10/30/17 1448 131/64 97.5  F (36.4  C) Axillary 59 18 95 % - (!) 152.1 kg (335 lb 6.4 oz)   10/30/17 1359 118/56 - - - - 96 % - -   10/30/17 1157 145/75 97.9  F (36.6  C) - 91 20 98 % 1.829 m (6') (!) 152 kg (335 lb)      Physical Exam  General: Resting on the gurney, appears comfortable  Head:  The scalp, face, and head appear normal  Mouth/Throat: Mucus membranes are moist  CV:  Regular rate    Normal S1 and S2  No pathological murmur   Resp:  Breath sounds clear and equal bilaterally    Non-labored, no retractions or  accessory muscle use    No coarseness    No wheezing   GI:  Abdomen is soft, no rigidity    No tenderness to palpation  MS:  Normal motor assessment of all extremities.    Good capillary refill noted.    Slight hyper reflexia of the pattellar tendons.    Sensation diminished throughout the bilateral lower extremities including saddle paraesthesia. No true numbness.   Skin:   No rash or lesions noted.  Neuro:  See MS.  Upper extremities and cranial nerves normal.  Psych: Awake. Alert.  Normal affect.      Appropriate interactions.   Emergency Department Course     Laboratory:  Laboratory findings were communicated with the patient who voiced understanding of the findings.  CBC: AWNL (WBC 7.1, HGB 14.9, )  BMP: Chloride: 110(H), o/w WNL (Creatinine 0.75)  Erythrocyte sedimentation rate auto: 6  CRP inflammation: <2.9    Emergency Department Course:  Nursing notes and vitals reviewed.  I performed an exam of the patient as documented above.   IV was inserted and blood was drawn for laboratory testing, results above.     I discussed the treatment plan with the patient. They expressed understanding of this plan and consented to admission. I discussed the patient with Dr. Joe, who will admit the patient to a monitored bed for further evaluation and treatment.     I personally reviewed the laboratory results with the Patient and answered all related questions prior to admitted.    Impression & Plan      Medical Decision Making:  Dontrell Schulz is a 37 year old male who presents to the emergency department today from an outside facility for a neurologic evaluation and admission with concern for transverse myelitis. Patient has had several weeks of bilateral lower extremity numbness. He has had outside MRI imaging which was concerning for a small area of myelitis. This was discussed with neurology who requests that the patient be admitted to the hospitalist service. The patient is comfortable with this plan.  These symptoms are not acute, however they do need further workup and treatment.      Diagnosis:    ICD-10-CM    1. Paresthesia of both legs R20.2 CBC with platelets differential     Basic metabolic panel     Erythrocyte sedimentation rate auto     CRP inflammation        Disposition:   Admitted    CMS Diagnoses: None     Scribe Disclosure:  Polo ROSA, am serving as a scribe at 1:16 PM on 10/30/2017 to document services personally performed by Julieta Alonzo MD, based on my observations and the provider's statements to me.    EMERGENCY DEPARTMENT       Julieta Alonzo MD  11/01/17 0857

## 2017-10-30 NOTE — IP AVS SNAPSHOT
[address] [facility]    [address]    [place] [address]    Phone:  [phone]                                       After Visit Summary   10/30/2017    Dontrell Schulz    MRN: [id number]           After Visit Summary Signature Page     I have received my discharge instructions, and my questions have been answered. I have discussed any challenges I see with this plan with the nurse or doctor.    ..........................................................................................................................................  Patient/Patient Representative Signature      ..........................................................................................................................................  Patient Representative Print Name and Relationship to Patient    ..................................................               ................................................  Date                                            Time    ..........................................................................................................................................  Reviewed by Signature/Title    ...................................................              ..............................................  Date                                                            Time

## 2017-10-30 NOTE — PHARMACY-ADMISSION MEDICATION HISTORY
Admission medication history interview status for the 10/30/2017  admission is complete. See EPIC admission navigator for prior to admission medications     Medication history source reliability:Good    Actions taken by pharmacist (provider contacted, etc):Verified all meds with pictures of bottles that patient had taken on his phone     Additional medication history information not noted on PTA med list :Patient just completed a 10 day course of predniisone 60mg daily 10/28/2017    Medication reconciliation/reorder completed by provider prior to medication history? No    Time spent in this activity: 10 minutes    Prior to Admission medications    Medication Sig Last Dose Taking? Auth Provider   LISINOPRIL PO Take 40 mg by mouth daily. 10/30/2017 at am Yes Reported, Patient   AMLODIPINE BESYLATE PO Take 10 mg by mouth daily. 10/30/2017 at am Yes Reported, Patient

## 2017-10-30 NOTE — MR AVS SNAPSHOT
"              After Visit Summary   10/30/2017    Dontrell Schulz    MRN: [id number]           Patient Information     Date Of Birth          1980        Visit Information        Provider Department      10/30/2017 9:30 AM Jeremias Bearden PA-C [facility]        Today's Diagnoses     Lower extremity numbness    -  1       Follow-ups after your visit        Who to contact     If you have questions or need follow up information about today's clinic visit or your schedule please contact [facility] directly at [phone].  Normal or non-critical lab and imaging results will be communicated to you by [employer], letter or phone within 4 business days after the clinic has received the results. If you do not hear from us within 7 days, please contact the clinic through [employer] or phone. If you have a critical or abnormal lab result, we will notify you by phone as soon as possible.  Submit refill requests through [employer] or call your pharmacy and they will forward the refill request to us. Please allow 3 business days for your refill to be completed.          Additional Information About Your Visit        MyChart Information     [employer] lets you send messages to your doctor, view your test results, renew your prescriptions, schedule appointments and more. To sign up, go to [email or web address] . Click on \"Log in\" on the left side of the screen, which will take you to the Welcome page. Then click on \"Sign up Now\" on the right side of the page.     You will be asked to enter the access code listed below, as well as some personal information. Please follow the directions to create your username and password.     Your access code is: [email or web address]  Expires: 1/10/2018  3:01 PM     Your access code will  in 90 days. If you need help or a new code, please call your [facility] or [phone].        Care EveryWhere ID     This is your Care EveryWhere ID. This could be used by other " "organizations to access your [facility] medical records  [id number]        Your Vitals Were     Temperature Height BMI (Body Mass Index)             97.8  F (36.6  C) (Temporal) 5' 11.25\" (1.81 m) 47.01 kg/m2          Blood Pressure from Last 3 Encounters:   10/12/17 130/60   03/28/17 139/75   05/16/12 148/85    Weight from Last 3 Encounters:   10/30/17 (!) 339 lb 6.4 oz (154 kg)   10/12/17 (!) 321 lb 12.8 oz (146 kg)   05/16/12 (!) 385 lb (174.6 kg)              Today, you had the following     No orders found for display       Primary Care Provider Office Phone # Fax #    [facility] [phone] [address] [facility] [address]        Equal Access to Services     JUAN BAEZ : Hadii aad ku hadasho Soomaali, waaxda luqadaha, qaybta kaalmada adeegyada, amarjit siu hayanitha lynch . So [facility] [phone].    ATENCIÓN: Si habla español, tiene a burton disposición servicios gratuitos de asistencia lingüística. Llame al [phone].    We comply with applicable federal civil rights laws and [place] laws. We do not discriminate on the basis of race, color, national origin, age, disability, sex, sexual orientation, or gender identity.            Thank you!     Thank you for choosing [facility]  for your care. Our goal is always to provide you with excellent care. Hearing back from our patients is one way we can continue to improve our services. Please take a few minutes to complete the written survey that you may receive in the mail after your visit with us. Thank you!             Your Updated Medication List - Protect others around you: Learn how to safely use, store and throw away your medicines at www.disposemymeds.org.          This list is accurate as of: 10/30/17 10:12 AM.  Always use your most recent med list.                   Brand Name Dispense Instructions for use Diagnosis    AMLODIPINE BESYLATE PO      Take 10 mg by mouth daily.        " LISINOPRIL PO      Take 40 mg by mouth daily.        SINUS & COLD-D PO           TYLENOL PO      Take 1,000 mg by mouth every 6 hours as needed Reported on 3/28/2017

## 2017-10-30 NOTE — ED NOTES
"[facility]  ED Nurse Handoff Report    ED Chief complaint: Numbness (for the waist down for the past few months, had MRI's this last week, was at clinic today in [place] today and told to come to ER)      ED Diagnosis:   Final diagnoses:   Paresthesia of both legs       Code Status: Full Code    Allergies:   Allergies   Allergen Reactions     Nkda [No Known Drug Allergies]        Activity level - Baseline/Home:  Independent    Activity Level - Current:   Independent     Needed?: No    Isolation: No  Infection: Not Applicable    Bariatric?: No    Vital Signs:   Vitals:    10/30/17 1157   BP: 145/75   Pulse: 91   Resp: 20   Temp: 97.9  F (36.6  C)   SpO2: 98%   Weight: (!) 152 kg (335 lb)   Height: 1.829 m (6')       Cardiac Rhythm: ,        Pain level:      Is this patient confused?: No    Patient Report: Initial Complaint: numbness  Focused Assessment: VSS on R/A. Pt has numbness present below waistline (groin, buttocks, hips, legs, feet) since ~ 10/3. Pt feels \"dulled\" sensation in all of these areas, specifically in feet bilaterally which he states \"always feel very cold\" even if they're submerged in hot water. PMH of hypertension. MRI x4 with PCP, most recently showing possible concerns for transverse myelitis at T4. Neuro exam exhibits numbness as noted previously, 5/5 strength in all extremities. Cardiac and Respiratory WNL. Plan to admit for further workup.   Tests Performed: CBC, BMP  Abnormal Results: MRI from primary clinic  Treatments provided: N/A    Family Comments: wife at bedside    OBS brochure/video discussed/provided to patient: N/A    ED Medications: Medications - No data to display    Drips infusing?:  No      ED NURSE PHONE NUMBER: [phone]         "

## 2017-10-30 NOTE — PROGRESS NOTES
Dr. Nacho Saucedo  [facility]  Neurosurgery Clinic Visit      CC: Lower extremity numbness    Primary care Provider: Carlos Eduardo Sadler      Reason For Visit:   I was asked to consult on the patient for lower extremity numbness.      HPI: Dontrell Schulz is a 37 year old male who presents for evaluation of his chief complaint of numbness in his lower extremities. He states that his symptoms started with numbness in his right foot in early October of this year, and have since progressed to numbness encompassing both of his lower extremities. He is having some saddle anesthesia as well. He still notes a sense of urgency when he needs to void, but is not able to void completely. He describes a lot of dysesthesias as well, such as pins and needles in his feet, and a very uncomfortable sensation when water is running over his buttocks. He denies any focal weaknesses in the lower extremities, or any problems with balance or coordination. He denies any fevers or chills. He has not had any recent treatment, other than taking a 10 day course of prednisone, which did seem to improve his urinary symptoms.    Past Medical History:   Diagnosis Date     Hypertension        Past Medical History reviewed with patient during visit.    Past Surgical History:   Procedure Laterality Date     EYE SURGERY      trauma related - Retinal injury     Past Surgical History reviewed with patient during visit.    Current Outpatient Prescriptions   Medication     Pseudoephedrine-Naproxen Na (SINUS & COLD-D PO)     LISINOPRIL PO     AMLODIPINE BESYLATE PO     Acetaminophen (TYLENOL PO)     No current facility-administered medications for this visit.        Allergies   Allergen Reactions     Nkda [No Known Drug Allergies]        Social History     Social History     Marital status:      Spouse name: N/A     Number of children: N/A     Years of education: N/A     Social History Main Topics     Smoking status:  "Current Every Day Smoker     Packs/day: 1.00     Smokeless tobacco: Never Used     Alcohol use Yes      Comment: occas     Drug use: No     Sexual activity: Yes     Other Topics Concern     None     Social History Narrative       No family history on file.       ROS: 10 point ROS neg other than the symptoms noted above in the HPI.    Vital Signs: Temp 97.8  F (36.6  C) (Temporal)  Ht 5' 11.25\" (1.81 m)  Wt (!) 339 lb 6.4 oz (154 kg)  BMI 47.01 kg/m2    Examination:  Constitutional:  Alert, well nourished, NAD.  HEENT: Normocephalic, atraumatic.   Pulmonary:  Without shortness of breath, normal effort.   Lymph: no lymphadenopathy to low back or LE.   Integumentary: Skin is free of rashes or lesions.   Cardiovascular:  No pitting edema of BLE.      Neurological:  Awake  Alert  Oriented x 3  Speech clear  Cranial nerves II - XII grossly intact  Motor exam   Hip Flexor:                Right: 5/5  Left:  5/5  Hip Adductor:             Right:  5/5  Left:  5/5  Hip Abductor:             Right:  5/5  Left:  5/5  Gastroc Soleus:        Right:  5/5  Left:  5/5  Tib/Ant:                      Right:  5/5  Left:  5/5  EHL:                          Right:  5/5  Left:  5/5       Sensation normal to bilateral upper and lower extremities.    Reflexes are 2+ in the patellar and Achilles. There is no clonus. Downgoing Babinski.    Musculoskeletal:  Gait: Able to stand from a seated position. Normal non-antalgic, non-myelopathic gait.  Able to heel/toe walk without loss of balance  Lumbar examination reveals no tenderness of the spine or paraspinous muscles.  Hip height is symmetrical. Negative SI joint, sciatic notch or greater trochanteric tenderness to palpation bilaterally.  Straight leg raise is negative bilaterally.      Imaging:   MRI of the lumbar spine was reviewed in the office today. It shows mild disc degeneration from L3 through S1, with a left paracentral disc bulge at L3-4, causing narrowing in the lateral " recess.  MRI of the thoracic spine with and without contrast was also reviewed. There is an abnormal T2 hyperintensity signal that is seen at the level of T4, as well as a small, 2 mm T2 hyperintensity from T8 to T9 that is likely a syrinx.    Assessment/Plan:     Bilateral lower extremity numbness  Bowel and bladder urgency  T2 hyperintensity lesion within the cord at the level of T4    Dontrell Schulz is a 37 year old male. I did have a discussion with the patient and his significant other regarding his symptoms. He understands the findings described in his MRI above. It was felt that the lesion at T4 could be suggestive of transverse myelitis, which would warrant further workup.  I did encourage him to proceeded down to [facility], to be admitted and worked up more thoroughly. He voiced agreement and understanding          Jeremias Bearden PA-C  [facility]  [address] [address]    Tel [phone]  Pager [phone]

## 2017-10-30 NOTE — IP AVS SNAPSHOT
MRN:[id number]                      After Visit Summary   10/30/2017    Dontrell Schulz    MRN: [id number]           Thank you!     Thank you for choosing [facility] for your care. Our goal is always to provide you with excellent care. Hearing back from our patients is one way we can continue to improve our services. Please take a few minutes to complete the written survey that you may receive in the mail after you visit with us. Thank you!        Patient Information     Date Of Birth          1980        Designated Caregiver       Most Recent Value    Caregiver    Will someone help with your care after discharge? yes    Name of designated caregiver Brigitte    Phone number of caregiver [phone]    Caregiver address see demographics      About your hospital stay     You were admitted on:  October 30, 2017 You last received care in the:  [facility] [address] [facility]    You were discharged on:  October 31, 2017        Reason for your hospital stay       Probable transverse Myelitis (inflammation of the thoracic spinal cord).                  Who to Call     For medical emergencies, please call 911.  For non-urgent questions about your medical care, please call your primary care provider or clinic, [phone]          Attending Provider     Provider Specialty    Julieta Alonzo MD Emergency Medicine    [facility], Vidal Rodriguez MD Internal Medicine       Primary Care Provider Office Phone # Fax #    [facility] [phone]      After Care Instructions     Activity       Your activity upon discharge: activity as tolerated            Diet       Follow this diet upon discharge: Regular            Discharge Instructions       Call Dr. Hull at Pager [phone] if questions, or Cell Phone [phone].                  Follow-up Appointments     Follow-up and recommended labs and tests        Follow up with Dr Kiara Cunningham on Thursday November 16th at 9:15  am. Please check in at 9:00.  [facility]  [address], [address]  [place] [address]  [phone]                  Your next 10 appointments already scheduled     Nov 01, 2017 11:30 AM CDT   Level 2 with NL INFUSION CHAIR 5   [facility] ([facility])    [address] [place] [address] Dr Neida HILL [id number]   [phone]            Nov 02, 2017  2:00 PM CDT   Level 2 with NL INFUSION CHAIR 5   [facility] ([facility])    [address] [place] [address] Dr Neida HILL [id number]   [phone]            Nov 03, 2017  1:30 PM CDT   Level 2 with NL INFUSION CHAIR 3   [facility] ([facility])    [address] [place] [address] Dr Neida HILL [id number]   [phone]              Further instructions from your care team       Your next 3 doses will be at [facility]. [address] [place] [address], [place] [address]. [phone]. Please check in at the main registration desk 5 to 10 minutes early.  Wed Nov 1st at 11:30 am.  Thur Nov 2nd at 2:00 pm.  Fri Nov 3rd at 1:30 pm.    Pending Results     Date and Time Order Name Status Description    10/31/2017 0001 Lyme Disease Alana with reflex to WB Serum In process     10/30/2017 1631 Anti Nuclear Alana IgG by IFA with Reflex In process     10/30/2017 1600 Oligoclonal banding CSF Tube 3 and Blood In process     10/30/2017 1600 West Nile Virus IgG and IgM CSF: Tube 3 In process     10/30/2017 1600 VDRL CSF: Tube 2 In process     10/30/2017 1600 Lyme IgG and IgM CSF Immunoblot: Tube 3 In process     10/30/2017 1600 HSV Types 1 and 2 Qualitative PCR CSF: Tube 2 In process     10/30/2017 1600 Cytology non gyn CSF Tube 4 In process     10/30/2017 1600 CSF Culture Aerobic Bacterial Tube 2 Preliminary             Statement of Approval     Ordered          10/31/17 1017  I have reviewed and agree with all the recommendations and orders detailed in this  "document.  EFFECTIVE NOW     Approved and electronically signed by:  Eder Hull MD             Admission Information     Date & Time Provider Department Dept. Phone    10/30/2017 Vidal Joe MD [address] [facility] [phone]      Your Vitals Were     Blood Pressure Pulse Temperature Respirations Height Weight    139/62 (BP Location: Left arm) 69 98  F (36.7  C) (Oral) 16 1.829 m (6') 152.1 kg (335 lb 6.4 oz)    Pulse Oximetry BMI (Body Mass Index)                95% 45.49 kg/m2          MyChart Information     [employer] lets you send messages to your doctor, view your test results, renew your prescriptions, schedule appointments and more. To sign up, go to [email or web address] . Click on \"Log in\" on the left side of the screen, which will take you to the Welcome page. Then click on \"Sign up Now\" on the right side of the page.     You will be asked to enter the access code listed below, as well as some personal information. Please follow the directions to create your username and password.     Your access code is: [email or web address]  Expires: 1/10/2018  3:01 PM     Your access code will  in 90 days. If you need help or a new code, please call your [facility] clinic or [phone].        Care EveryWhere ID     This is your Care EveryWhere ID. This could be used by other organizations to access your [facility] medical records  [id number]        Equal Access to Services     [facility] Center[facility] AH: Hadii sandoval lopez hadasho Soomaali, waaxda luqadaha, qaybta kaalmada adeegyada, amarjit calvert. So [facility] [phone].    ATENCIÓN: Si habla español, tiene a burton disposición servicios gratuitos de asistencia lingüística. Llame al [phone].    We comply with applicable federal civil rights laws and [place] laws. We do not discriminate on the basis of race, color, national origin, age, disability, sex, sexual orientation, or gender identity.               Review " of your medicines      START taking        Dose / Directions    methylPREDNISolone sodium succinate 1 g   Used for:  Transverse myelitis (H)   Notes to Patient:  Infusion to be done outpatient        Dose:  1 g   Start taking on:  11/1/2017   Inject 1 g into the vein daily for 3 days   Quantity:  3 dose.   Refills:  0       predniSONE 10 MG tablet   Commonly known as:  DELTASONE   Used for:  Transverse myelitis (H)   Notes to Patient:  Taper dose        60mg on 11/4, 50mg on 11/5, 40mg on 11/6, 30mg on 11/7, 20mg on 11/8, 10mg on 11/9   Quantity:  21 tablet   Refills:  0         CONTINUE these medicines which have NOT CHANGED        Dose / Directions    AMLODIPINE BESYLATE PO        Dose:  10 mg   Take 10 mg by mouth daily.   Refills:  0       LISINOPRIL PO        Dose:  40 mg   Take 40 mg by mouth daily.   Refills:  0            Where to get your medicines      Some of these will need a paper prescription and others can be bought over the counter. Ask your nurse if you have questions.     Bring a paper prescription for each of these medications     predniSONE 10 MG tablet         Information about where to get these medications is not yet available     ! Ask your nurse or doctor about these medications     methylPREDNISolone sodium succinate 1 g                Protect others around you: Learn how to safely use, store and throw away your medicines at www.disposemymeds.org.             Medication List: This is a list of all your medications and when to take them. Check marks below indicate your daily home schedule. Keep this list as a reference.      Medications           Morning Afternoon Evening Bedtime As Needed    AMLODIPINE BESYLATE PO   Take 10 mg by mouth daily.   Last time this was given:  10 mg on 10/31/2017  9:08 AM   Next Dose Due:  11/1                                LISINOPRIL PO   Take 40 mg by mouth daily.   Last time this was given:  40 mg on 10/31/2017  9:08 AM   Next Dose Due:  11/1                                 methylPREDNISolone sodium succinate 1 g   Inject 1 g into the vein daily for 3 days   Start taking on:  11/1/2017   Last time this was given:  1,000 mg on 10/31/2017  9:08 AM   Next Dose Due:  11/1   Notes to Patient:  Infusion to be done outpatient                                predniSONE 10 MG tablet   Commonly known as:  DELTASONE   60mg on 11/4, 50mg on 11/5, 40mg on 11/6, 30mg on 11/7, 20mg on 11/8, 10mg on 11/9   Next Dose Due:  Start taking 11/4, see schedule above   Notes to Patient:  Taper dose                                          More Information        Methylprednisolone Solution for Injection  Brand Names: A-Methapred, Solu-Medrol  What is this medicine?  METHYLPREDNISOLONE (meth ill pred NISS oh lone) is a corticosteroid. It is commonly used to treat inflammation of the skin, joints, lungs, and other organs. Common conditions treated include asthma, allergies, and arthritis. It is also used for other conditions, such as blood disorders and diseases of the adrenal glands.  How should I use this medicine?  This medicine is for injection or infusion into a vein. It is also for injection into a muscle. It is given by a health care professional in a hospital or clinic setting.  Talk to your pediatrician regarding the use of this medicine in children. While this drug may be prescribed for selected conditions, precautions do apply.  What side effects may I notice from receiving this medicine?  Side effects that you should report to your doctor or health care professional as soon as possible:    allergic reactions like skin rash, itching or hives, swelling of the face, lips, or tongue    bloody or tarry stools    changes in vision    hallucination, loss of contact with reality    muscle cramps    muscle pain    palpitations    signs and symptoms of high blood sugar such as dizziness; dry mouth; dry skin; fruity breath; nausea; stomach pain; increased hunger or thirst; increased  urination    signs and symptoms of infection like fever or chills; cough; sore throat; pain or trouble passing urine    trouble passing urine or change in the amount of urine  Side effects that usually do not require medical attention (report to your doctor or health care professional if they continue or are bothersome):    changes in emotions or mood    constipation    diarrhea    excessive hair growth on the face or body    headache    nausea, vomiting    pain, redness, or irritation at site where injected    trouble sleeping    weight gain  What may interact with this medicine?  Do not take this medicine with any of the following medications:    alefacept    echinacea    iopamidol    live virus vaccines    metyrapone    mifepristone  This medicine may also interact with the following medications:    amphotericin B    aspirin and aspirin-like medicines    certain antibiotics like erythromycin, clarithromycin, troleandomycin    certain medicines for diabetes    certain medicines for fungal infection like ketoconazole    certain medicines for seizures like carbamazepine, phenobarbital, phenytoin    certain medicines that treat or prevent blood clots like warfarin    cyclosporine    digoxin    diuretics    female hormones, like estrogens and birth control pills    isoniazid    NSAIDS, medicines for pain and inflammation, like ibuprofen or naproxen    other medicines for myasthenia gravis    rifampin    vaccines  What if I miss a dose?  This does not apply.  Where should I keep my medicine?  This drug is given in a hospital or clinic and will not be stored at home.  What should I tell my health care provider before I take this medicine?  They need to know if you have any of these conditions:    Cushing's syndrome    eye disease, vision problems    diabetes    glaucoma    heart disease    high blood pressure    infection (especially a virus infection such as chickenpox, cold sores, or herpes)    liver disease    mental  illness    myasthenia gravis    osteoporosis    recently received or scheduled to receive a vaccine    seizures    stomach or intestine problems    thyroid disease    an unusual or allergic reaction to lactose, methylprednisolone, other medicines, foods, dyes, or preservatives    pregnant or trying to get pregnant    breast-feeding  What should I watch for while using this medicine?  Tell your doctor or healthcare professional if your symptoms do not start to get better or if they get worse. Do not stop taking except on your doctor's advice. You may develop a severe reaction. Your doctor will tell you how much medicine to take.  Your condition will be monitored carefully while you are receiving this medicine.  This medicine may increase your risk of getting an infection. Tell your doctor or health care professional if you are around anyone with measles or chickenpox, or if you develop sores or blisters that do not heal properly.  This medicine may affect blood sugar levels. If you have diabetes, check with your doctor or health care professional before you change your diet or the dose of your diabetic medicine.  Tell your doctor or health care professional right away if you have any change in your eyesight.  Using this medicine for a long time may increase your risk of low bone mass. Talk to your doctor about bone health.  NOTE:This sheet is a summary. It may not cover all possible information. If you have questions about this medicine, talk to your doctor, pharmacist, or health care provider. Copyright  2017 [employer]vier                Patient Education    Prednisone Gastro-resistant tablet    Prednisone Oral solution    Prednisone Oral tablet  Prednisone Oral tablet  What is this medicine?  PREDNISONE (PRED ni sone) is a corticosteroid. It is commonly used to treat inflammation of the skin, joints, lungs, and other organs. Common conditions treated include asthma, allergies, and arthritis. It is also used for other  conditions, such as blood disorders and diseases of the adrenal glands.  This medicine may be used for other purposes; ask your health care provider or pharmacist if you have questions.  What should I tell my health care provider before I take this medicine?  They need to know if you have any of these conditions:    Cushing's syndrome    diabetes    glaucoma    heart disease    high blood pressure    infection (especially a virus infection such as chickenpox, cold sores, or herpes)    kidney disease    liver disease    mental illness    myasthenia gravis    osteoporosis    seizures    stomach or intestine problems    thyroid disease    an unusual or allergic reaction to lactose, prednisone, other medicines, foods, dyes, or preservatives    pregnant or trying to get pregnant    breast-feeding  How should I use this medicine?  Take this medicine by mouth with a glass of water. Follow the directions on the prescription label. Take this medicine with food. If you are taking this medicine once a day, take it in the morning. Do not take more medicine than you are told to take. Do not suddenly stop taking your medicine because you may develop a severe reaction. Your doctor will tell you how much medicine to take. If your doctor wants you to stop the medicine, the dose may be slowly lowered over time to avoid any side effects.  Talk to your pediatrician regarding the use of this medicine in children. Special care may be needed.  Overdosage: If you think you have taken too much of this medicine contact a poison control center or emergency room at once.  NOTE: This medicine is only for you. Do not share this medicine with others.  What if I miss a dose?  If you miss a dose, take it as soon as you can. If it is almost time for your next dose, talk to your doctor or health care professional. You may need to miss a dose or take an extra dose. Do not take double or extra doses without advice.  What may interact with this  medicine?  Do not take this medicine with any of the following medications:    metyrapone    mifepristone  This medicine may also interact with the following medications:    aminoglutethimide    amphotericin B    aspirin and aspirin-like medicines    barbiturates    certain medicines for diabetes, like glipizide or glyburide    cholestyramine    cholinesterase inhibitors    cyclosporine    digoxin    diuretics    ephedrine    female hormones, like estrogens and birth control pills    isoniazid    ketoconazole    NSAIDS, medicines for pain and inflammation, like ibuprofen or naproxen    phenytoin    rifampin    toxoids    vaccines    warfarin  This list may not describe all possible interactions. Give your health care provider a list of all the medicines, herbs, non-prescription drugs, or dietary supplements you use. Also tell them if you smoke, drink alcohol, or use illegal drugs. Some items may interact with your medicine.  What should I watch for while using this medicine?  Visit your doctor or health care professional for regular checks on your progress. If you are taking this medicine over a prolonged period, carry an identification card with your name and address, the type and dose of your medicine, and your doctor's name and address.  This medicine may increase your risk of getting an infection. Tell your doctor or health care professional if you are around anyone with measles or chickenpox, or if you develop sores or blisters that do not heal properly.  If you are going to have surgery, tell your doctor or health care professional that you have taken this medicine within the last twelve months.  Ask your doctor or health care professional about your diet. You may need to lower the amount of salt you eat.  This medicine may affect blood sugar levels. If you have diabetes, check with your doctor or health care professional before you change your diet or the dose of your diabetic medicine.  What side effects may  I notice from receiving this medicine?  Side effects that you should report to your doctor or health care professional as soon as possible:    allergic reactions like skin rash, itching or hives, swelling of the face, lips, or tongue    changes in emotions or moods    changes in vision    depressed mood    eye pain    fever or chills, cough, sore throat, pain or difficulty passing urine    increased thirst    swelling of ankles, feet  Side effects that usually do not require medical attention (report to your doctor or health care professional if they continue or are bothersome):    confusion, excitement, restlessness    headache    nausea, vomiting    skin problems, acne, thin and shiny skin    trouble sleeping    weight gain  This list may not describe all possible side effects. Call your doctor for medical advice about side effects. You may report side effects to FDA at 8-324-FDA-1461.  Where should I keep my medicine?  Keep out of the reach of children.  Store at room temperature between 15 and 30 degrees C (59 and 86 degrees F). Protect from light. Keep container tightly closed. Throw away any unused medicine after the expiration date.  NOTE:This sheet is a summary. It may not cover all possible information. If you have questions about this medicine, talk to your doctor, pharmacist, or health care provider. Copyright  2016 [employer]

## 2017-10-30 NOTE — H&P
DATE OF SERVICE:  10/30/2017      PRIMARY CARE PHYSICIAN:  Carlos Eduardo Sadler.      CHIEF COMPLAINT:  Bilateral lower extremity numbness.      HISTORY OF PRESENT ILLNESS:  Mr. Dontrell Schulz is a 37-year-old morbidly obese male with past medical history significant for hypertension who was sent from clinic for further evaluation of his bilateral lower extremity numbness.  He states his symptoms started with numbness of his right foot about 3 weeks ago in early October and then since has progressed to numbness of both lower extremities from his waist below.  He is able to hold his urine, but has a sensation of incomplete voiding.  He denies stool incontinence.  Denies any fever or URI symptoms prior to this and denies any chest pain, shortness of breath or pain in abdomen.  He presented to clinic on 10/12 and had MRI of the thoracolumbar spine and brain done and he was started on a 10-day course of oral prednisone which he completed yesterday.  His MRI was later noted with some hyperintensity in the T4 area with some concerns for transverse myelitis, so he was referred to the ER for further evaluation.  In the ER he was seen by Dr. Alonzo who talked to Dr. Marah Del Cid from Neurology who suggested admission to Neurology floor.  Neurology did not recommend any high-dose steroids at this time and probably plans for lumbar puncture.      REVIEW OF SYSTEMS:  A 10-point review of systems was done and was negative apart from those mentioned in the history of present illness.      PAST MEDICAL HISTORY:  Hypertension.      MEDICATIONS PRIOR TO ADMISSION:  Prescriptions Prior to Admission   Medication Sig Dispense Refill Last Dose     LISINOPRIL PO Take 40 mg by mouth daily.   10/30/2017 at am     AMLODIPINE BESYLATE PO Take 10 mg by mouth daily.   10/30/2017 at am        ALLERGIES:  No known drug allergies.      SOCIAL HISTORY:  He reports smoking 1 pack per day.  Drinks alcohol very rarely.  Denies illicit drug use.       FAMILY HISTORY:  Reviewed and not pertinent to current presentation.      PHYSICAL EXAMINATION:   GENERAL:  The patient is conscious, alert, oriented x3, lying comfortably in bed in no apparent distress.   VITAL SIGNS:  Temperature 97.9, heart rate 91, blood pressure 145/75, saturation 98% on room air.   HEENT:  Pupils are equal and reactive to light and accommodation.  Extraocular movements are intact.  Oral mucosa is moist.   NECK:  Supple, no rigidity.   RESPIRATORY:  Lung sounds bilaterally clear to auscultation, no wheezes or crepitation.   CARDIOVASCULAR:  Normal S1-S2, regular rate and rhythm, no murmur.   ABDOMEN:  Soft, nontender, nondistended, obese, no guarding, rigidity or rebound tenderness.   LOWER EXTREMITIES:  With no edema.   NEUROLOGIC:  He does have abnormal sensation in both lower extremities from the waist below power however is preserved and is 5/5.  Reflexes are normal.  Tone is normal.   PSYCHIATRIC:  Normal mood and affect.      LABORATORY AND IMAGING:  Routine labs, CBC, BMP is pending.  I reviewed MRI imagings from 10/14.  MRI brain was normal.  MRI lumbar spine noted with L3-L4 central left posterior paramedian disk herniation causing mild impression on the thecal sac at the origin of the L4 nerve root.  MRI of thoracic spine from 10/17 noted with abnormal T2 hyperintense signal within the thoracic spinal cord at the level of T4.   Probable small central syrinx within the cervical spinal cord from T8-T9.      ASSESSMENT AND PLAN:  Mr. Schulz is a 37-year-old male with past medical history significant for hypertension who was sent from clinic for further evaluation of bilateral lower extremity numbness.     1.  Bilateral lower extremity numbness.  The symptoms have been going on for 3 weeks.  MRI imaging as noted above with no cord compression but concern for transverse myelitis. Dr. Alonzo from ER has talked to Dr. Del Cid from Neurology who suggested admission to Neurology and  will evaluate further.  He will probably need an LP ; will defer further management to Neurology.  We will admit as inpatient.  He was also evaluated by Neurosurgery in the clinic.     2.  Hypertension.  We will resume his prior to admission lisinopril and amlodipine when verified by pharmacy and he will have hydralazine p.r.n.   3.  Deep venous thrombosis prophylaxis:  Mechanical with PCD boots.      CODE STATUS:  Full code.         KAILYN BARAJAS MD             D: 10/30/2017 13:30   T: 10/30/2017 13:54   MT:       Name:     NAVARRO PHILLIPS   MRN:      [id number]        Account:      [id number]   :      1980           Admitted:     [id number]      Document: [id number]

## 2017-10-30 NOTE — PROGRESS NOTES
"Dontrell Schulz is a 37 year old male who presents for:  Chief Complaint   Patient presents with     Neurologic Problem     Paresthesias Polyneuropathy Ref: Dr. Sarah. Pt states that he is numb from the waist down        Initial Vitals:  Temp 97.8  F (36.6  C) (Temporal)  Ht 5' 11.25\" (1.81 m)  Wt (!) 339 lb 6.4 oz (154 kg)  BMI 47.01 kg/m2 Estimated body mass index is 47.01 kg/(m^2) as calculated from the following:    Height as of this encounter: 5' 11.25\" (1.81 m).    Weight as of this encounter: 339 lb 6.4 oz (154 kg).. Body surface area is 2.78 meters squared. BP completed using cuff size: NA (Not Taken)  Moderate Pain (5)    Do you feel safe in your environment?  Yes  Do you need any refills today? No    Nursing Comments:         Nicol Arriaga CMA    "

## 2017-10-30 NOTE — LETTER
"    10/30/2017         RE: Dontrell Schulz  [id number] [facility] [id number]        Dear Colleague,    Thank you for referring your patient, Dontrell Schulz, to the [facility]. Please see a copy of my visit note below.    Dontrell Schulz is a 37 year old male who presents for:  Chief Complaint   Patient presents with     Neurologic Problem     Paresthesias Polyneuropathy Ref: Dr. Sarah. Pt states that he is numb from the waist down        Initial Vitals:  Temp 97.8  F (36.6  C) (Temporal)  Ht 5' 11.25\" (1.81 m)  Wt (!) 339 lb 6.4 oz (154 kg)  BMI 47.01 kg/m2 Estimated body mass index is 47.01 kg/(m^2) as calculated from the following:    Height as of this encounter: 5' 11.25\" (1.81 m).    Weight as of this encounter: 339 lb 6.4 oz (154 kg).. Body surface area is 2.78 meters squared. BP completed using cuff size: NA (Not Taken)  Moderate Pain (5)    Do you feel safe in your environment?  Yes  Do you need any refills today? No    Nursing Comments:         VIVIAN Taylor Dr.  [facility]  Neurosurgery Clinic Visit      CC: Lower extremity numbness    Primary care Provider: [facility]      Reason For Visit:   I was asked to consult on the patient for lower extremity numbness.      HPI: Dontrell Schulz is a 37 year old male who presents for evaluation of his chief complaint of numbness in his lower extremities. He states that his symptoms started with numbness in his right foot in early October of this year, and have since progressed to numbness encompassing both of his lower extremities. He is having some saddle anesthesia as well. He still notes a sense of urgency when he needs to void, but is not able to void completely. He describes a lot of dysesthesias as well, such as pins and needles in his feet, and a very uncomfortable sensation when water is running over his buttocks. He denies any focal weaknesses in the lower " "extremities, or any problems with balance or coordination. He denies any fevers or chills. He has not had any recent treatment, other than taking a 10 day course of prednisone, which did seem to improve his urinary symptoms.    Past Medical History:   Diagnosis Date     Hypertension        Past Medical History reviewed with patient during visit.    Past Surgical History:   Procedure Laterality Date     EYE SURGERY      trauma related - Retinal injury     Past Surgical History reviewed with patient during visit.    Current Outpatient Prescriptions   Medication     Pseudoephedrine-Naproxen Na (SINUS & COLD-D PO)     LISINOPRIL PO     AMLODIPINE BESYLATE PO     Acetaminophen (TYLENOL PO)     No current facility-administered medications for this visit.        Allergies   Allergen Reactions     Nkda [No Known Drug Allergies]        Social History     Social History     Marital status:      Spouse name: N/A     Number of children: N/A     Years of education: N/A     Social History Main Topics     Smoking status: Current Every Day Smoker     Packs/day: 1.00     Smokeless tobacco: Never Used     Alcohol use Yes      Comment: occas     Drug use: No     Sexual activity: Yes     Other Topics Concern     None     Social History Narrative       No family history on file.       ROS: 10 point ROS neg other than the symptoms noted above in the HPI.    Vital Signs: Temp 97.8  F (36.6  C) (Temporal)  Ht 5' 11.25\" (1.81 m)  Wt (!) 339 lb 6.4 oz (154 kg)  BMI 47.01 kg/m2    Examination:  Constitutional:  Alert, well nourished, NAD.  HEENT: Normocephalic, atraumatic.   Pulmonary:  Without shortness of breath, normal effort.   Lymph: no lymphadenopathy to low back or LE.   Integumentary: Skin is free of rashes or lesions.   Cardiovascular:  No pitting edema of BLE.      Neurological:  Awake  Alert  Oriented x 3  Speech clear  Cranial nerves II - XII grossly intact  Motor exam   Hip Flexor:                Right: 5/5  Left:  " 5/5  Hip Adductor:             Right:  5/5  Left:  5/5  Hip Abductor:             Right:  5/5  Left:  5/5  Gastroc Soleus:        Right:  5/5  Left:  5/5  Tib/Ant:                      Right:  5/5  Left:  5/5  EHL:                          Right:  5/5  Left:  5/5       Sensation normal to bilateral upper and lower extremities.    Reflexes are 2+ in the patellar and Achilles. There is no clonus. Downgoing Babinski.    Musculoskeletal:  Gait: Able to stand from a seated position. Normal non-antalgic, non-myelopathic gait.  Able to heel/toe walk without loss of balance  Lumbar examination reveals no tenderness of the spine or paraspinous muscles.  Hip height is symmetrical. Negative SI joint, sciatic notch or greater trochanteric tenderness to palpation bilaterally.  Straight leg raise is negative bilaterally.      Imaging:   MRI of the lumbar spine was reviewed in the office today. It shows mild disc degeneration from L3 through S1, with a left paracentral disc bulge at L3-4, causing narrowing in the lateral recess.  MRI of the thoracic spine with and without contrast was also reviewed. There is an abnormal T2 hyperintensity signal that is seen at the level of T4, as well as a small, 2 mm T2 hyperintensity from T8 to T9 that is likely a syrinx.    Assessment/Plan:     Bilateral lower extremity numbness  Bowel and bladder urgency  T2 hyperintensity lesion within the cord at the level of T4    Dontrell Schulz is a 37 year old male. I did have a discussion with the patient and his significant other regarding his symptoms. He understands the findings described in his MRI above. It was felt that the lesion at T4 could be suggestive of transverse myelitis, which would warrant further workup.  I did encourage him to proceeded down to [facility], to be admitted and worked up more thoroughly. He voiced agreement and understanding          Jeremias Bearden PA-C  [facility]  [facility]  [address] [address]    Tel [phone]  Pager [phone]      Again, thank you for allowing me to participate in the care of your patient.        Sincerely,        Jeremias Bearden PA-C

## 2017-10-30 NOTE — CONSULTS
[facility] [place]  [facility]    Neurology Consultation    Dontrell Schulz MRN# [id number]   YOB: 1980 Age: 37 year old    Code Status:Full Code   Date of Admission: 10/30/2017  Date of Consult:10/30/2017                                                                                       Assessment and Plan:                                         #. Transverse myelitis involving thoracic spinal cord  --Workup with MRI scan cervical spine and lumbar puncture.  Methylprednisolone 1 g IV daily for five days followed by oral prednisone taper over six days  Patient is mildly affectedand has been experiencing symptoms for one month-he is likely at the peak of symptoms, and spontaneous improvement over the weeks ahead would be expected.  Advised smoking cessation  llab work will also include Lyme titer and ERICA      #. Hypertension  --management per hospitalist service    #. DVT Prophylaxis  *Patient is able to ambulate  #. PT/OT/Speech  --Start  evaluations        ----------------------------------------------------------------------------------  ----------------------------------------------------------------------------------  Reason for consult: I was asked by Dr. Joe to evaluate this patient for transverse myelitis.       Chief Complaint:   Numbness of legs  History is obtained from the patient / chart       History of Present Illness:   This patient is a 37 year old male who presents with Numbness of both of his lower extremities which started approximately in early October.  Symptoms initially started in the right foot and spread over a few days to the left foot and up to his waist level.  In mid October, he saw his primary care provider in [place].  An MRI of the brain and lumbar spine was performed on October 14.  He had an additional MRI scan performed on October 17 which revealed findings consistent with thoracic myelitis-with most prominent  findings at approximately T4 through T6.  At that point, he was referred to neurosurgery and was started on oral prednisone.  Over the last two weeks, he reports his symptoms have been about the same, perhaps slightly improved.  He has not noticed any weakness of his legs although they do feel somewhat funny with the numbness.  He notes that his genital area feels numb when he urinates or has a bowel movement.  However, he has not had any incontinence.  On a few occasions he has had some bowel urgency.  No change in ability to have an erection or ejaculation.  No upper extremity symptoms.  Denies any pain  He is a smoker and does have a history of hypertension which is controlled.  He recalls having a flu vaccine in late September.  Over the last few weeks he has had some cold like symptoms but no fever or chills.  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    Today he was seen at neurosurgery and advised more urgent evaluation in the emergency room.       Past Medical History:     Past Medical History:   Diagnosis Date     Hypertension          Past Surgical History:     Past Surgical History:   Procedure Laterality Date     EYE SURGERY      trauma related - Retinal injury          Social History:     Social History     Social History     Marital status:      Spouse name: N/A     Number of children: N/A     Years of education: N/A     Social History Main Topics     Smoking status: Current Every Day Smoker     Packs/day: 1.00     Smokeless tobacco: Never Used     Alcohol use Yes      Comment: occas     Drug use: No     Sexual activity: Yes     Other Topics Concern     None     Social History Narrative     Patient is a smoker, only rare alcohol intake.   two children.  , [place]      Family History:   No family history on file.  Reviewed and not felt to be contributory. No family history of autoimmune conditions, multiple  "sclerosis       Home Medications:     Prior to Admission Medications   Prescriptions Last Dose Informant Patient Reported? Taking?   AMLODIPINE BESYLATE PO 10/30/2017 at am Self Yes Yes   Sig: Take 10 mg by mouth daily.   LISINOPRIL PO 10/30/2017 at am Self Yes Yes   Sig: Take 40 mg by mouth daily.      Facility-Administered Medications: None          Allergy:     Allergies   Allergen Reactions     Nkda [No Known Drug Allergies]           Inpatient Medications:   Scheduled Meds:    [START ON 10/31/2017] amLODIPine (NORVASC) tablet 10 mg  10 mg Oral Daily     [START ON 10/31/2017] lisinopril (PRINIVIL/ZESTRIL) tablet 40 mg  40 mg Oral Daily     methylPREDNISolone  1,000 mg Intravenous Daily     PRN Meds: hydrALAZINE, naloxone, acetaminophen, senna-docusate, magnesium hydroxide, bisacodyl, ondansetron **OR** ondansetron, prochlorperazine **OR** prochlorperazine **OR** prochlorperazine, LORazepam        Review of Systems    Otherwise noncontributory  A comprehensive review of  10 systems was performed and found to be negative except as described in this note  CONSTITUTIONAL: negative for fever, chills, change in weight  INTEGUMENTARY/SKIN: no rash or obvious new lesions  ENT/MOUTH: no sore throat, new sinus pain or nasal drainage, no neck mass noted  RESP: No pleuretic pain, No cough, no hemoptysis, No SOB   CV: negative for chest pain, palpitations or peripheral edema  GI: no nausea, vomiting, change in stools  : no dysuria or hematuria  MUSCULOSKELETAL: no myalgias, arthralgias or join efffusion  ENDOCRINE: no history of polyuria, polydyspsia or symptoms of thyroid dysfunction  PSYCHIATRIC: no change in mood stable  LYMPHATIC: no new lymphadenopathy  HEME: no bleeding or easy bruisability  NEURO: see HPI       Physical Exam:   Physical Exam   Vitals:  Height:6' 0\"  Weight:335 lbs 6.4 oz   Temp: 97.5  F (36.4  C) Temp src: Axillary BP: 131/64 Pulse: 59   Resp: 18 SpO2: 95 % O2 Device: None (Room air)    General " Appearance:  No acute distress, overweight  Neuro:       Mental Status Exam:   Alert and oriented.  Language and speech intact       Cranial Nerves:  mminimal vision in the right eye, exotropia, irregular pupil.  Vision intact in the left eye pupil reactive.  Extraocular movements intact, strength and sensation is normal no masseter or tongue deviation palate and shoulder elevation symmetrical.  Hearing intact           Motor:  Tone bulk and strength intact          Reflexes:  Symmetrically intact, difficult to elicit in the legs.  Plantar signs normal.  No clonus       Sensory:  Pinprick sensation-if anything he feels the legs are hypersensitive to pin and cold.  There is a subtle area at the hip level where he feels the pain might be slightly less intense.  Vibration is intact                   Coordination:   Minimal ataxia in the lower extremity.       Gait:  Walks independently without difficulty.  Romberg normal no upper extremity drift  Neck: no nuchal rigidity, normal thyroid. No carotid bruits.    Cardiovascular: Regular rate and rhythm, no m/r/g    Extremities: No clubbing, no cyanosis, no edema       Data:   ROUTINE IP LABS   CBC RESULTS:     Recent Labs  Lab 10/30/17  1311   WBC 7.1   RBC 4.76   HGB 14.9   HCT 43.1        Basic Metabolic Panel:   Recent Labs   Lab Test  10/30/17   1311   NA  142   POTASSIUM  4.0   CHLORIDE  110*   CO2  28   BUN  21   CR  0.75   GLC  94   GEN  10.0     Thyroid Panel:  Recent Labs   Lab Test  10/12/17   1430   TSH  0.60      Vitamin B12:   Recent Labs   Lab Test  10/12/17   1430   B12  402      A1C:   Recent Labs   Lab Test  10/12/17   1430   A1C  5.2     CRP inflammation:   Recent Labs   Lab Test  10/30/17   1311  10/12/17   1430   CRP  <2.9  <2.9     ESR:   Recent Labs   Lab Test  10/30/17   1311   SED  6            IMAGING:   All imaging studies were reviewed personally    MRIreports and images reviewed-T2 signal hyperintensities in the upper thoracic spinal  cord-nonenhancing most consistent with transverse myelitis  MRI scan of the brain and lumbar spine also reviewed-no other demyelinating lesions

## 2017-10-31 ENCOUNTER — APPOINTMENT (OUTPATIENT)
Dept: GENERAL RADIOLOGY | Facility: CLINIC | Age: 37
DRG: 098 | End: 2017-10-31
Attending: PSYCHIATRY & NEUROLOGY
Payer: COMMERCIAL

## 2017-10-31 VITALS
OXYGEN SATURATION: 95 % | HEART RATE: 69 BPM | BODY MASS INDEX: 42.66 KG/M2 | TEMPERATURE: 98 F | WEIGHT: 315 LBS | HEIGHT: 72 IN | RESPIRATION RATE: 16 BRPM | DIASTOLIC BLOOD PRESSURE: 62 MMHG | SYSTOLIC BLOOD PRESSURE: 139 MMHG

## 2017-10-31 PROBLEM — I10 ESSENTIAL HYPERTENSION: Status: ACTIVE | Noted: 2017-10-31

## 2017-10-31 PROBLEM — R20.9 DISTURBANCE OF SKIN SENSATION: Status: RESOLVED | Noted: 2017-10-30 | Resolved: 2017-10-31

## 2017-10-31 PROBLEM — E66.01 MORBID OBESITY (H): Status: ACTIVE | Noted: 2017-10-31

## 2017-10-31 PROBLEM — G37.3 TRANSVERSE MYELITIS (H): Status: ACTIVE | Noted: 2017-10-31

## 2017-10-31 LAB
ALB CSF/SERPL: NORMAL {RATIO}
ALBUMIN CSF-MCNC: NORMAL MG/DL
ALBUMIN SERPL-MCNC: NORMAL G/DL
ANA SER QL IF: NEGATIVE
APPEARANCE CSF: CLEAR
B BURGDOR IGG+IGM SER QL: 0.08 (ref 0–0.89)
COLOR CSF: COLORLESS
GLUCOSE CSF-MCNC: 94 MG/DL (ref 40–70)
GRAM STN SPEC: NORMAL
HSV1 DNA CSF QL NAA+PROBE: NOT DETECTED
HSV2 DNA CSF QL NAA+PROBE: NOT DETECTED
IGG CSF-MCNC: NORMAL MG/DL
IGG SERPL-MCNC: NORMAL MG/DL
IGG SYNTH RATE SER+CSF CALC-MRATE: NORMAL MG/(24.H)
IGG/ALB CLEAR SER+CSF-RTO: NORMAL
IGG/ALB CSF: NORMAL {RATIO}
MICROBIOLOGIST REVIEW: NORMAL
PROT CSF-MCNC: 43 MG/DL (ref 15–60)
RBC # CSF MANUAL: 4 /UL (ref 0–2)
RBC # CSF MANUAL: NORMAL /UL (ref 0–2)
SPECIMEN SOURCE: NORMAL
TUBE # CSF: 4 #
WBC # CSF MANUAL: 2 /UL (ref 0–5)
WBC # CSF MANUAL: NORMAL /UL (ref 0–5)

## 2017-10-31 PROCEDURE — 99238 HOSP IP/OBS DSCHRG MGMT 30/<: CPT | Performed by: INTERNAL MEDICINE

## 2017-10-31 PROCEDURE — 36415 COLL VENOUS BLD VENIPUNCTURE: CPT | Performed by: PSYCHIATRY & NEUROLOGY

## 2017-10-31 PROCEDURE — 86789 WEST NILE VIRUS ANTIBODY: CPT | Performed by: PSYCHIATRY & NEUROLOGY

## 2017-10-31 PROCEDURE — 83916 OLIGOCLONAL BANDS: CPT | Performed by: PSYCHIATRY & NEUROLOGY

## 2017-10-31 PROCEDURE — 88108 CYTOPATH CONCENTRATE TECH: CPT | Mod: 26 | Performed by: PSYCHIATRY & NEUROLOGY

## 2017-10-31 PROCEDURE — 84157 ASSAY OF PROTEIN OTHER: CPT | Performed by: PSYCHIATRY & NEUROLOGY

## 2017-10-31 PROCEDURE — 009U3ZX DRAINAGE OF SPINAL CANAL, PERCUTANEOUS APPROACH, DIAGNOSTIC: ICD-10-PCS | Performed by: PHYSICIAN ASSISTANT

## 2017-10-31 PROCEDURE — 82784 ASSAY IGA/IGD/IGG/IGM EACH: CPT | Performed by: PSYCHIATRY & NEUROLOGY

## 2017-10-31 PROCEDURE — 86617 LYME DISEASE ANTIBODY: CPT | Performed by: PSYCHIATRY & NEUROLOGY

## 2017-10-31 PROCEDURE — 88108 CYTOPATH CONCENTRATE TECH: CPT | Performed by: PSYCHIATRY & NEUROLOGY

## 2017-10-31 PROCEDURE — 25000128 H RX IP 250 OP 636: Performed by: PSYCHIATRY & NEUROLOGY

## 2017-10-31 PROCEDURE — 87529 HSV DNA AMP PROBE: CPT | Performed by: PSYCHIATRY & NEUROLOGY

## 2017-10-31 PROCEDURE — 86592 SYPHILIS TEST NON-TREP QUAL: CPT | Performed by: PSYCHIATRY & NEUROLOGY

## 2017-10-31 PROCEDURE — 77003 FLUOROGUIDE FOR SPINE INJECT: CPT

## 2017-10-31 PROCEDURE — 87070 CULTURE OTHR SPECIMN AEROBIC: CPT | Performed by: PSYCHIATRY & NEUROLOGY

## 2017-10-31 PROCEDURE — 82040 ASSAY OF SERUM ALBUMIN: CPT | Performed by: PSYCHIATRY & NEUROLOGY

## 2017-10-31 PROCEDURE — 99207 ZZC CDG-CODE INCORRECT PER BILLING BASED ON TIME: CPT | Performed by: INTERNAL MEDICINE

## 2017-10-31 PROCEDURE — 89050 BODY FLUID CELL COUNT: CPT | Performed by: PSYCHIATRY & NEUROLOGY

## 2017-10-31 PROCEDURE — 82042 OTHER SOURCE ALBUMIN QUAN EA: CPT | Performed by: PSYCHIATRY & NEUROLOGY

## 2017-10-31 PROCEDURE — 86618 LYME DISEASE ANTIBODY: CPT | Performed by: PSYCHIATRY & NEUROLOGY

## 2017-10-31 PROCEDURE — 82945 GLUCOSE OTHER FLUID: CPT | Performed by: PSYCHIATRY & NEUROLOGY

## 2017-10-31 PROCEDURE — 86788 WEST NILE VIRUS AB IGM: CPT | Performed by: PSYCHIATRY & NEUROLOGY

## 2017-10-31 PROCEDURE — 00000102 ZZHCL STATISTIC CYTO WRIGHT STAIN TC: Performed by: PSYCHIATRY & NEUROLOGY

## 2017-10-31 PROCEDURE — 99207 ZZC CDG-CHARGE REQUIRED MANUAL ENTRY: CPT | Performed by: INTERNAL MEDICINE

## 2017-10-31 PROCEDURE — 87205 SMEAR GRAM STAIN: CPT | Performed by: PSYCHIATRY & NEUROLOGY

## 2017-10-31 PROCEDURE — 25000132 ZZH RX MED GY IP 250 OP 250 PS 637: Performed by: HOSPITALIST

## 2017-10-31 RX ORDER — LIDOCAINE HYDROCHLORIDE 10 MG/ML
5 INJECTION, SOLUTION EPIDURAL; INFILTRATION; INTRACAUDAL; PERINEURAL ONCE
Status: COMPLETED | OUTPATIENT
Start: 2017-10-31 | End: 2017-10-31

## 2017-10-31 RX ORDER — PREDNISONE 10 MG/1
TABLET ORAL
Qty: 21 TABLET | Refills: 0 | Status: SHIPPED | OUTPATIENT
Start: 2017-10-31 | End: 2017-11-01

## 2017-10-31 RX ADMIN — LIDOCAINE HYDROCHLORIDE 3.5 MG: 10 INJECTION, SOLUTION EPIDURAL; INFILTRATION; INTRACAUDAL; PERINEURAL at 08:41

## 2017-10-31 RX ADMIN — AMLODIPINE BESYLATE 10 MG: 10 TABLET ORAL at 09:08

## 2017-10-31 RX ADMIN — LISINOPRIL 40 MG: 40 TABLET ORAL at 09:08

## 2017-10-31 RX ADMIN — SODIUM CHLORIDE 1000 MG: 9 INJECTION, SOLUTION INTRAVENOUS at 09:08

## 2017-10-31 ASSESSMENT — ACTIVITIES OF DAILY LIVING (ADL)
ADLS_ACUITY_SCORE: 11

## 2017-10-31 NOTE — PLAN OF CARE
Problem: Patient Care Overview  Goal: Plan of Care/Patient Progress Review  Outcome: No Change  A&Ox4. VSS. RA. Neuros intact.  C/o numbness and tingling from waist down bilaterally. Foot pulses +2. Independent in room. No complaints regarding urination during the night.  LP be done today at 0800.  Pt. has been NPO after 0500.

## 2017-10-31 NOTE — PROGRESS NOTES
[facility]  Ne[facility] Sp[facility] [place]  Neurology Daily Note      Admission Date:10/30/2017   Date of service: 10/31/2017   Hospital Day: 2                                                   Assessment and Plan:   #. Transverse myelitis involving thoracic spinal cord  Mri cervical spine no significant abnormalities.  Ok to discharge.  Lp preliminary results neg, await final studies.  Methylprednisolone 1 g IV daily for 3 additional days as out patient followed by oral prednisone taper over six days 60mg x1day, then 50mg qam x1day, then 40mg qam x1day, then 30mg qam x1day, then 20mg qam x1day, then 10mg in am then d/c  Patient is mildly affectedand has been experiencing symptoms for one month-he is likely at the peak of symptoms, and spontaneous improvement over the weeks ahead would be expected.  Advised smoking cessation  lab work will also include Lyme titer and ERICA  discussed findings with pt and wife.     Follow up  [facility] ~2 weeks  11/16 Dr. Guillen 9:15 am (will eventually be seen at [facility].        D/w hospital staff.      #. Hypertension  --management per hospitalist service      Interval History:   Ready for discharge.  No difference in sx.    Steroids causing some nervousness.        Review of Systems:   Otherwise non contributory,.        Medications:   Scheduled Meds:    amLODIPine (NORVASC) tablet 10 mg  10 mg Oral Daily     lisinopril (PRINIVIL/ZESTRIL) tablet 40 mg  40 mg Oral Daily     methylPREDNISolone  1,000 mg Intravenous Daily     PRN Meds: hydrALAZINE, naloxone, acetaminophen, senna-docusate, magnesium hydroxide, bisacodyl, ondansetron **OR** ondansetron, prochlorperazine **OR** prochlorperazine **OR** prochlorperazine, LORazepam, - MEDICATION INSTRUCTIONS -        Physical Exam:   Vitals: Temp: 98  F (36.7  C) Temp src: Oral BP: 139/62 Pulse: 69   Resp: 16 SpO2: 95 % O2 Device: None (Room air)    Vital Signs with Ranges: Temp:  [97.4  F (36.3  C)-98   F (36.7  C)] 98  F (36.7  C)  Pulse:  [55-69] 69  Resp:  [12-18] 16  BP: (118-163)/(48-75) 139/62  SpO2:  [93 %-96 %] 95 %    General Appearance:  No acute distress, overweight  Neuro:       Mental Status Exam:   Alert and oriented.  Language and speech intact       Cranial Nerves:  minimal vision in the right eye, exotropia, irregular pupil.  Vision intact in the left eye pupil reactive.  Extraocular movements intact, strength and sensation is normal no masseter or tongue deviation palate and shoulder elevation symmetrical.  Hearing intact           Motor:  Tone bulk and strength intact          Reflexes:  Symmetrically intact, difficult to elicit in the legs.  Plantar signs normal.  No clonus       Sensory:  Pinprick sensation-if anything he feels the legs are hypersensitive to pin and cold.  There is a subtle area at the hip level where he feels the pain might be slightly less intense.  Vibration is intact                   Coordination:   Minimal ataxia in the lower extremity.       Gait:  Walks independently without difficulty.  Romberg normal no upper extremity drift  Neck: no nuchal rigidity, normal thyroid. No carotid bruits.    Cardiovascular: Regular rate and rhythm, no m/r/g     Extremities: No clubbing, no cyanosis, no edema       Data:   ROUTINE IP LABS (Last 3results)  CBC RESULTS:     Recent Labs   Lab Test  10/30/17   1311  10/12/17   1430   WBC  7.1  7.5   RBC  4.76  5.14   HGB  14.9  15.5   HCT  43.1  45.1   PLT  255  276     Basic Metabolic Panel:  Recent Labs   Lab Test  10/30/17   1311   NA  142   POTASSIUM  4.0   CHLORIDE  110*   CO2  28   BUN  21   CR  0.75   GLC  94   GEN  10.0     Thyroid Panel:  Recent Labs   Lab Test  10/12/17   1430   TSH  0.60      Vitamin B12:   Recent Labs   Lab Test  10/12/17   1430   B12  402      Vitamin D level: No lab results found.  A1C:   Recent Labs   Lab Test  10/12/17   1430   A1C  5.2     CRP inflammation:   Recent Labs   Lab Test  10/30/17   1311   10/12/17   1430   CRP  <2.9  <2.9     ESR:   Recent Labs   Lab Test  10/30/17   1311   SED  6        IMAGING:   All imaging studies were reviewed personally  Mri cervical spine images and report reviewed.        TIME     35minutes Evaluation/managment time on floor.

## 2017-10-31 NOTE — DISCHARGE SUMMARY
[facility]    Discharge Summary  Hospitalist    Date of Admission:  10/30/2017  Date of Discharge:  10/31/2017  Discharging Provider: Eder Rosa MD    Discharge Diagnoses   Principal Problem:    Lower extremity numbness  Active Problems:    Morbid obesity -- BMI 45.5    Essential hypertension      History of Present Illness   Dontrell Schulz is an 37 year old male who presented with bilateral lower extremity numbness for 3 weeks and   Thoracic spine MR showed:  1. Abnormal T2 hyperintense signal within the thoracic spinal cord at  the level of T4. No definite associated contrast enhancement. This is  nonspecific, but may be related to transverse myelitis or possibly  demyelination. This is only seen on the sagittal images, the patient  is requested to be brought back for axial images through this region.  2. Probable small central syrinx within the cervical spinal cord from  T8 to T9. No other abnormal signal or enhancement within the spinal  cord.  3. Mild degenerative changes in the mid thoracic spine with a small  central disc protrusion at T7-T8. No significant spinal canal or  neural foraminal narrowing.  4. Degenerative changes at the right T9 costovertebral junction with  associated bone marrow edema.    Hospital Course   Transferred to [facility], seen by neurology, Lumbar Puncture showed 2 WBC's, 4 RBC's,  and normal protein at 43 .  Pt treated with IV Solumedrol, 1 gm daily and will continue for 3 days as outpatient, and then Prednisone taper as directed by Neurology, Dr Navya Manuel.  Patient was feeling OK at time of discharge, and his bilateral leg numbness had not changed.      Numerous lab tests were pending on CSF (listed below) at time of discharge and these will be evaluated with outpatient neurology follow up.      Addendum -- Oligoclonal bands negative, cytology negative for malignancy, West Nile IgG negative, VDRL non-reactive, Lyme IgG and IgM  negative, Herpes Simplex 1 and 2 PCR negative.   Also on peripheral blood lyme titer negative, ERICA negative, CRP normal at <2.9 and ESR normal at 6. Testing on 10/12/17 showed normal B12 at 402 and Hgb A1C of 5.2, and TSH normal at 0.60.      Eder Rosa MD  Pager: [phone]  Cell Phone:  [phone]     Significant Results and Procedures   As above    Pending Results   These results will be followed up by Neurology.   Unresulted Labs Ordered in the Past 30 Days of this Admission     Date and Time Order Name Status Description    10/31/2017 0001 Lyme Disease Alana with reflex to WB Serum In process     10/30/2017 1631 Anti Nuclear Alana IgG by IFA with Reflex In process     10/30/2017 1600 Oligoclonal banding CSF Tube 3 and Blood In process     10/30/2017 1600 West Nile Virus IgG and IgM CSF: Tube 3 In process     10/30/2017 1600 VDRL CSF: Tube 2 In process     10/30/2017 1600 Lyme IgG and IgM CSF Immunoblot: Tube 3 In process     10/30/2017 1600 HSV Types 1 and 2 Qualitative PCR CSF: Tube 2 In process     10/30/2017 1600 Cytology non gyn CSF Tube 4 In process     10/30/2017 1600 CSF Culture Aerobic Bacterial Tube 2 Preliminary           Code Status   Full Code       Primary Care Physician   [facility]    Physical Exam   Temp: 98  F (36.7  C) Temp src: Oral BP: 139/62 Pulse: 69   Resp: 16 SpO2: 95 % O2 Device: None (Room air)    Vitals:    10/30/17 1157 10/30/17 1448   Weight: (!) 152 kg (335 lb) (!) 152.1 kg (335 lb 6.4 oz)     Vital Signs with Ranges  Temp:  [97.4  F (36.3  C)-98  F (36.7  C)] 98  F (36.7  C)  Pulse:  [55-69] 69  Resp:  [12-18] 16  BP: (118-163)/(48-75) 139/62  SpO2:  [93 %-96 %] 95 %       Exam on discharge: leg strength intact but decreased light touch sensation.     Discharge Disposition   Discharged to home  Condition at discharge: Stable    Consultations This Hospital Stay   NEUROLOGY IP CONSULT    Time Spent on this Encounter   I spent a total of 25 minutes  discharging this patient.     Discharge Orders     Reason for your hospital stay   Probable transverse Myelitis (inflammation of the thoracic spinal cord).     [facility]ult [facility] Follow-up and recommended labs and tests   Follow up with [facility], Dr Guillen, at 9:45 AM on 11/16/17     Activity   Your activity upon discharge: activity as tolerated     Discharge Instructions   Call Dr. Hull at Pager [phone] if questions, or Cell Phone [phone].     Full Code     Diet   Follow this diet upon discharge: Regular       Discharge Medications   Current Discharge Medication List      START taking these medications    Details   methylPREDNISolone sodium succinate 1 g Inject 1 g into the vein daily for 3 days  Qty: 3 dose., Refills: 0    Associated Diagnoses: Transverse myelitis (H)      predniSONE (DELTASONE) 10 MG tablet 60mg on 11/4, 50mg on 11/5, 40mg on 11/6, 30mg on 11/7, 20mg on 11/8, 10mg on 11/9  Qty: 21 tablet, Refills: 0    Associated Diagnoses: Transverse myelitis (H)         CONTINUE these medications which have NOT CHANGED    Details   LISINOPRIL PO Take 40 mg by mouth daily.      AMLODIPINE BESYLATE PO Take 10 mg by mouth daily.           Allergies   Allergies   Allergen Reactions     Nkda [No Known Drug Allergies]      Data   Most Recent 3 CBC's:  Recent Labs   Lab Test  10/30/17   1311  10/12/17   1430   WBC  7.1  7.5   HGB  14.9  15.5   MCV  91  88   PLT  255  276      Most Recent 3 BMP's:  Recent Labs   Lab Test  10/30/17   1311   NA  142   POTASSIUM  4.0   CHLORIDE  110*   CO2  28   BUN  21   CR  0.75   ANIONGAP  4   GEN  10.0   GLC  94     Most Recent 2 LFT's:No lab results found.  Most Recent INR's and Anticoagulation Dosing History:  Anticoagulation Dose History     There is no flowsheet data to display.        Most Recent 3 Troponin's:No lab results found.  Most Recent Cholesterol Panel:No lab results found.  Most Recent 6 Bacteria Isolates From Any Culture (See EPIC Reports for  Culture Details):  Recent Labs   Lab Test  10/31/17   0830   CULT  PENDING     Most Recent TSH, T4 and A1c Labs:  Recent Labs   Lab Test  10/12/17   1430   TSH  0.60   A1C  5.2

## 2017-10-31 NOTE — PROGRESS NOTES
RADIOLOGY PROCEDURE NOTE  Patient name: Dontrell Schulz  MRN: [id number]  : 1980    Pre-procedure diagnosis: Transverse Myelitis  Post-procedure diagnosis: Same    Procedure Date/Time: 2017  8:48 AM  Procedure: Lumbar puncture  Estimated blood loss: None  Specimen(s) collected with description: 10 cc clear CSF  The patient tolerated the procedure well with no immediate complications.  Significant findings:none    See imaging dictation for procedural details.    Provider name: Trung Coombs  Assistant(s):None

## 2017-10-31 NOTE — PLAN OF CARE
Problem: Patient Care Overview  Goal: Plan of Care/Patient Progress Review  Outcome: No Change  A&O x4. VSS. RA. C/o numbness and tingling from waist down bilaterally. Foot pulses +2. Ind in room. Some loss of sensation with urination. MRI today, see results. Plans to do LP tomorrow, NPO at 0500. Will continue to monitor

## 2017-10-31 NOTE — PROGRESS NOTES
Asked by Dr Manuel to set up OP IV solumedrol infusions for the next 3 days near pt's home in [place].    Appts set up for Nov 1,2 and 3 at [facility] infusion.  They have orders.  Put pt's neuro appt in S. He will see Dr Cunningham in [place] for 1st appt and then f/u in [place] with her after that. Updated bedside RN who will review all appts with pt.  DC home ordered.

## 2017-10-31 NOTE — DISCHARGE INSTRUCTIONS
Your next 3 doses will be at [facility]. [address] [place] [address]. [phone]. Please check in at the main registration desk 5 to 10 minutes early.  Wed Nov 1st at 11:30 am.  Thur Nov 2nd at 2:00 pm.  Fri Nov 3rd at 1:30 pm.

## 2017-10-31 NOTE — PLAN OF CARE
Problem: Patient Care Overview  Goal: Plan of Care/Patient Progress Review  Outcome: Adequate for Discharge Date Met:  10/31/17  CMS improving.  Numbness & tingling improving in bilateral LEs.  Denies pain.  Up independently in room.  Discharge instructions reviewed and questions answered.  Instructed on IV site care.  Pt dc home with spouse.

## 2017-11-01 ENCOUNTER — INFUSION THERAPY VISIT (OUTPATIENT)
Dept: INFUSION THERAPY | Facility: CLINIC | Age: 37
End: 2017-11-01
Attending: PSYCHIATRY & NEUROLOGY
Payer: COMMERCIAL

## 2017-11-01 VITALS
BODY MASS INDEX: 45.98 KG/M2 | SYSTOLIC BLOOD PRESSURE: 117 MMHG | HEART RATE: 66 BPM | DIASTOLIC BLOOD PRESSURE: 46 MMHG | OXYGEN SATURATION: 97 % | TEMPERATURE: 98.6 F | RESPIRATION RATE: 16 BRPM | WEIGHT: 315 LBS

## 2017-11-01 DIAGNOSIS — G37.3 TRANSVERSE MYELITIS (H): Primary | ICD-10-CM

## 2017-11-01 LAB
B BURGDOR IGG CSF QL IB: NEGATIVE
B BURGDOR IGM CSF QL IB: NEGATIVE
COPATH REPORT: NORMAL
WNV IGG CSF-ACNC: 0.06 IV
WNV IGM CSF-ACNC: 0 IV

## 2017-11-01 PROCEDURE — 25000128 H RX IP 250 OP 636: Performed by: PSYCHIATRY & NEUROLOGY

## 2017-11-01 PROCEDURE — 96365 THER/PROPH/DIAG IV INF INIT: CPT

## 2017-11-01 RX ADMIN — SODIUM CHLORIDE 1 G: 9 INJECTION, SOLUTION INTRAVENOUS at 12:11

## 2017-11-01 ASSESSMENT — PAIN SCALES - GENERAL: PAINLEVEL: NO PAIN (0)

## 2017-11-01 NOTE — MR AVS SNAPSHOT
After Visit Summary   11/1/2017    Dontrell Schulz    MRN: [id number]           Patient Information     Date Of Birth          1980        Visit Information        Provider Department      11/1/2017 11:30 AM NL INFUSION CHAIR 5 [facility]        Today's Diagnoses     Transverse myelitis (H)    -  1      Care Instructions    Pt to return on 11/2 for Solumedrol. Copies of medication list and upcoming appointments given prior to discharge.           Follow-ups after your visit        Follow-up notes from your care team     Return in 1 day (on 11/2/2017).      Your next 10 appointments already scheduled     Nov 02, 2017  2:00 PM CDT   Level 2 with NL INFUSION CHAIR 5   [facility] ([facility])    [address] [place] [id number] [phone]            Nov 03, 2017  1:30 PM CDT   Level 2 with NL INFUSION CHAIR 3   [facility] ([facility])    [address] [place] [id number] [phone]              Who to contact     If you have questions or need follow up information about today's clinic visit or your schedule please contact [facility] directly at [phone].  Normal or non-critical lab and imaging results will be communicated to you by APRhart, letter or phone within 4 business days after the clinic has received the results. If you do not hear from us within 7 days, please contact the clinic through APRhart or phone. If you have a critical or abnormal lab result, we will notify you by phone as soon as possible.  Submit refill requests through O[employer] or call your pharmacy and they will forward the refill request to us. Please allow 3 business days for your refill to be completed.          Additional Information About Your Visit        APRharivWatch Information     O[employer] lets you send messages to your doctor, view your test results, renew your  "prescriptions, schedule appointments and more. To sign up, go to [email or web address] . Click on \"Log in\" on the left side of the screen, which will take you to the Welcome page. Then click on \"Sign up Now\" on the right side of the page.     You will be asked to enter the access code listed below, as well as some personal information. Please follow the directions to create your username and password.     Your access code is: [email or web address]  Expires: 1/10/2018  3:01 PM     Your access code will  in 90 days. If you need help or a new code, please call your [place] clinic or [phone].        Care EveryWhere ID     This is your Care EveryWhere ID. This could be used by other organizations to access your [place] medical records  [id number]        Your Vitals Were     Pulse Temperature Respirations Pulse Oximetry BMI (Body Mass Index)       66 98.6  F (37  C) (Temporal) 16 97% 45.98 kg/m2        Blood Pressure from Last 3 Encounters:   17 117/46   10/31/17 139/62   10/12/17 130/60    Weight from Last 3 Encounters:   17 (!) 153.8 kg (339 lb)   10/30/17 (!) 152.1 kg (335 lb 6.4 oz)   10/30/17 (!) 154 kg (339 lb 6.4 oz)              Today, you had the following     No orders found for display       Primary Care Provider Office Phone # Fax #    [facility] [phone]       [address] [address]        Equal Access to Services     [facility] : Hadii sandoval lopez hadasho Solilly, waaxda luqadaha, qaybta kaalmada zachariah, amarjit calvert. So [facility] [phone].    ATENCIÓN: Si habla español, tiene a burton disposición servicios gratuitos de asistencia lingüística. Llame al [phone].    We comply with applicable federal civil rights laws and [place] laws. We do not discriminate on the basis of race, color, national origin, age, disability, sex, sexual orientation, or gender identity.            Thank you!     Thank you for choosing " [facility]  for your care. Our goal is always to provide you with excellent care. Hearing back from our patients is one way we can continue to improve our services. Please take a few minutes to complete the written survey that you may receive in the mail after your visit with us. Thank you!             Your Updated Medication List - Protect others around you: Learn how to safely use, store and throw away your medicines at www.disposemymeds.org.          This list is accurate as of: 11/1/17  2:59 PM.  Always use your most recent med list.                   Brand Name Dispense Instructions for use Diagnosis    AMLODIPINE BESYLATE PO      Take 10 mg by mouth daily.        LISINOPRIL PO      Take 40 mg by mouth daily.        methylPREDNISolone sodium succinate 1 g     3 dose.    Inject 1 g into the vein daily for 3 days    Transverse myelitis (H)

## 2017-11-01 NOTE — PATIENT INSTRUCTIONS
Pt to return on 11/2 for Solumedrol. Copies of medication list and upcoming appointments given prior to discharge.

## 2017-11-02 ENCOUNTER — INFUSION THERAPY VISIT (OUTPATIENT)
Dept: INFUSION THERAPY | Facility: CLINIC | Age: 37
End: 2017-11-02
Attending: PSYCHIATRY & NEUROLOGY
Payer: COMMERCIAL

## 2017-11-02 VITALS
TEMPERATURE: 97.8 F | RESPIRATION RATE: 16 BRPM | SYSTOLIC BLOOD PRESSURE: 116 MMHG | HEART RATE: 70 BPM | WEIGHT: 315 LBS | OXYGEN SATURATION: 97 % | DIASTOLIC BLOOD PRESSURE: 50 MMHG | BODY MASS INDEX: 46.51 KG/M2

## 2017-11-02 DIAGNOSIS — G37.3 TRANSVERSE MYELITIS (H): Primary | ICD-10-CM

## 2017-11-02 LAB
ALB CSF/SERPL: 6.5 RATIO (ref 0–9)
ALBUMIN CSF-MCNC: 25 MG/DL (ref 0–35)
ALBUMIN SERPL-MCNC: 3830 MG/DL (ref 3500–5200)
IGG CSF-MCNC: 3.4 MG/DL (ref 0–6)
IGG SERPL-MCNC: 1070 MG/DL (ref 768–1632)
IGG SYNTH RATE SER+CSF CALC-MRATE: <0 MG/D
IGG/ALB CLEAR SER+CSF-RTO: 0.49 RATIO (ref 0.28–0.66)
IGG/ALB CSF: 0.14 RATIO (ref 0.09–0.25)
OLIGOCLONAL BANDS CSF ELPH-IMP: NEGATIVE
OLIGOCLONAL BANDS CSF ELPH-IMP: NORMAL
OLIGOCLONAL BANDS CSF IEF: 0 BANDS (ref 0–1)
VDRL CSF QL: NON REACTIVE

## 2017-11-02 PROCEDURE — 25000128 H RX IP 250 OP 636: Performed by: PSYCHIATRY & NEUROLOGY

## 2017-11-02 PROCEDURE — 96365 THER/PROPH/DIAG IV INF INIT: CPT

## 2017-11-02 RX ADMIN — SODIUM CHLORIDE 1 G: 9 INJECTION, SOLUTION INTRAVENOUS at 14:15

## 2017-11-02 ASSESSMENT — PAIN SCALES - GENERAL: PAINLEVEL: NO PAIN (0)

## 2017-11-02 NOTE — MR AVS SNAPSHOT
"              After Visit Summary   11/2/2017    Dontrell Schulz    MRN: [id number]           Patient Information     Date Of Birth          1980        Visit Information        Provider Department      11/2/2017 2:00 PM NL INFUSION CHAIR 5 [facility]        Today's Diagnoses     Transverse myelitis (H)    -  1      Care Instructions    Pt to return on 11/3 for IV Solumedrol. Copies of medication list and upcoming appointments given prior to discharge.           Follow-ups after your visit        Follow-up notes from your care team     Return in 1 day (on 11/3/2017).      Your next 10 appointments already scheduled     Nov 03, 2017  1:30 PM CDT   Level 2 with NL INFUSION CHAIR 3   [facility] ([facility])    [address] [place] [address] [phone]              Who to contact     If you have questions or need follow up information about today's clinic visit or your schedule please contact [facility] directly at [phone].  Normal or non-critical lab and imaging results will be communicated to you by Ciao Telecomhart, letter or phone within 4 business days after the clinic has received the results. If you do not hear from us within 7 days, please contact the clinic through FST21t or phone. If you have a critical or abnormal lab result, we will notify you by phone as soon as possible.  Submit refill requests through [employer] or call your pharmacy and they will forward the refill request to us. Please allow 3 business days for your refill to be completed.          Additional Information About Your Visit        Ciao Telecomhart Information     [employer] lets you send messages to your doctor, view your test results, renew your prescriptions, schedule appointments and more. To sign up, go to [email or web address] . Click on \"Log in\" on the left side of the screen, which will take you to the Welcome page. Then click on " "\"Sign up Now\" on the right side of the page.     You will be asked to enter the access code listed below, as well as some personal information. Please follow the directions to create your username and password.     Your access code is: [email or web address]  Expires: 1/10/2018  3:01 PM     Your access code will  in 90 days. If you need help or a new code, please call your [facility] or [phone].        Care EveryWhere ID     This is your Care EveryWhere ID. This could be used by other organizations to access your [place] medical records  [id number]        Your Vitals Were     Pulse Temperature Respirations Pulse Oximetry BMI (Body Mass Index)       70 97.8  F (36.6  C) (Temporal) 16 97% 46.51 kg/m2        Blood Pressure from Last 3 Encounters:   17 116/50   17 117/46   10/31/17 139/62    Weight from Last 3 Encounters:   17 (!) 155.5 kg (342 lb 14.4 oz)   17 (!) 153.8 kg (339 lb)   10/30/17 (!) 152.1 kg (335 lb 6.4 oz)              Today, you had the following     No orders found for display       Primary Care Provider Office Phone # Fax #    [facility] [phone] [address] [address]        Equal Access to Services     JUAN BAEZ : Hadii sandoval ku hadasho Sorockyali, waaxda luqadaha, qaybta kaalmada adeegyada, amarjit lynch . So [facility] [phone].    ATENCIÓN: Si habla español, tiene a burton disposición servicios gratuitos de asistencia lingüística. Llame al [phone].    We comply with applicable federal civil rights laws and [place] laws. We do not discriminate on the basis of race, color, national origin, age, disability, sex, sexual orientation, or gender identity.            Thank you!     Thank you for choosing [facility]  for your care. Our goal is always to provide you with excellent care. Hearing back from our patients is one way we can continue to improve our services. " Please take a few minutes to complete the written survey that you may receive in the mail after your visit with us. Thank you!             Your Updated Medication List - Protect others around you: Learn how to safely use, store and throw away your medicines at www.disposemymeds.org.          This list is accurate as of: 11/2/17  3:47 PM.  Always use your most recent med list.                   Brand Name Dispense Instructions for use Diagnosis    AMLODIPINE BESYLATE PO      Take 10 mg by mouth daily.        LISINOPRIL PO      Take 40 mg by mouth daily.        methylPREDNISolone sodium succinate 1 g     3 dose.    Inject 1 g into the vein daily for 3 days    Transverse myelitis (H)

## 2017-11-02 NOTE — PATIENT INSTRUCTIONS
Pt to return on 11/3 for IV Solumedrol. Copies of medication list and upcoming appointments given prior to discharge.

## 2017-11-03 ENCOUNTER — TELEPHONE (OUTPATIENT)
Dept: FAMILY MEDICINE | Facility: OTHER | Age: 37
End: 2017-11-03

## 2017-11-03 ENCOUNTER — HOSPITAL ENCOUNTER (OUTPATIENT)
Dept: ULTRASOUND IMAGING | Facility: CLINIC | Age: 37
Discharge: HOME | End: 2017-11-03
Attending: INTERNAL MEDICINE | Admitting: INTERNAL MEDICINE
Payer: COMMERCIAL

## 2017-11-03 ENCOUNTER — INFUSION THERAPY VISIT (OUTPATIENT)
Dept: INFUSION THERAPY | Facility: CLINIC | Age: 37
End: 2017-11-03
Attending: PSYCHIATRY & NEUROLOGY
Payer: COMMERCIAL

## 2017-11-03 VITALS
TEMPERATURE: 98.5 F | OXYGEN SATURATION: 97 % | RESPIRATION RATE: 18 BRPM | DIASTOLIC BLOOD PRESSURE: 67 MMHG | HEART RATE: 59 BPM | SYSTOLIC BLOOD PRESSURE: 112 MMHG

## 2017-11-03 DIAGNOSIS — G37.3 TRANSVERSE MYELITIS (H): Primary | ICD-10-CM

## 2017-11-03 DIAGNOSIS — M79.662 PAIN OF LEFT LOWER LEG: ICD-10-CM

## 2017-11-03 DIAGNOSIS — M79.662 PAIN OF LEFT LOWER LEG: Primary | ICD-10-CM

## 2017-11-03 PROCEDURE — 25000128 H RX IP 250 OP 636: Performed by: PSYCHIATRY & NEUROLOGY

## 2017-11-03 PROCEDURE — 96365 THER/PROPH/DIAG IV INF INIT: CPT

## 2017-11-03 PROCEDURE — 93971 EXTREMITY STUDY: CPT | Mod: LT

## 2017-11-03 RX ADMIN — SODIUM CHLORIDE 1 G: 9 INJECTION, SOLUTION INTRAVENOUS at 14:14

## 2017-11-03 ASSESSMENT — PAIN SCALES - GENERAL: PAINLEVEL: NO PAIN (0)

## 2017-11-03 NOTE — PROGRESS NOTES
Here for Solumedrol, difficulty starting IV requiring 5 IV sticks. Solumedrol infused without incident. Pt c/o pain in lt varicose vein site. bein noted to be distended, hard and painful to touch .  Spoke with Emerita GIL in [place] and discussed above situation.  Pt was discharged from here to ay for US of lt leg.  Pt discharged in stable condition.

## 2017-11-03 NOTE — MR AVS SNAPSHOT
"              After Visit Summary   11/3/2017    Dontrell Schulz    MRN: [id number]           Patient Information     Date Of Birth          1980        Visit Information        Provider Department      11/3/2017 1:30 PM NL INFUSION CHAIR 3 [facility]        Today's Diagnoses     Transverse myelitis (H)    -  1      Care Instructions    Pt to register/report to radiology for US left leg when leaving here.           Follow-ups after your visit        Follow-up notes from your care team     Return if symptoms worsen or fail to improve.      Future tests that were ordered for you today     Open Future Orders        Priority Expected Expires Ordered    US Lower Extremity Venous Duplex Left Routine  11/3/2018 11/3/2017            Who to contact     If you have questions or need follow up information about today's clinic visit or your schedule please contact [facility] directly at [phone].  Normal or non-critical lab and imaging results will be communicated to you by Eve Biomedicalhart, letter or phone within 4 business days after the clinic has received the results. If you do not hear from us within 7 days, please contact the clinic through [employer] Biomedicalhart or phone. If you have a critical or abnormal lab result, we will notify you by phone as soon as possible.  Submit refill requests through [employer] or call your pharmacy and they will forward the refill request to us. Please allow 3 business days for your refill to be completed.          Additional Information About Your Visit        MyChart Information     [employer] lets you send messages to your doctor, view your test results, renew your prescriptions, schedule appointments and more. To sign up, go to [email or web address] . Click on \"Log in\" on the left side of the screen, which will take you to the Welcome page. Then click on \"Sign up Now\" on the right side of the page.     You will be asked to enter the access code " listed below, as well as some personal information. Please follow the directions to create your username and password.     Your access code is: [email or web address]  Expires: 1/10/2018  3:01 PM     Your access code will  in 90 days. If you need help or a new code, please call your [facility] or [phone].        Care EveryWhere ID     This is your Care EveryWhere ID. This could be used by other organizations to access your [place] medical records  [id number]        Your Vitals Were     Pulse Temperature Respirations Pulse Oximetry          59 98.5  F (36.9  C) (Temporal) 18 97%         Blood Pressure from Last 3 Encounters:   17 112/67   17 116/50   17 117/46    Weight from Last 3 Encounters:   17 (!) 155.5 kg (342 lb 14.4 oz)   17 (!) 153.8 kg (339 lb)   10/30/17 (!) 152.1 kg (335 lb 6.4 oz)              Today, you had the following     No orders found for display       Primary Care Provider Office Phone # Fax #    [facility] [phone]       [address] [place] [address]        Equal Access to Services     JUAN BAEZ AH: Hadii aad ku hadasho Soomaali, waaxda luqadaha, qaybta kaalmada adeegyada, waxay frankin hayanitha calvert. So [facility] [phone].    ATENCIÓN: Si habla español, tiene a burton disposición servicios gratuitos de asistencia lingüística. Llame al [phone].    We comply with applicable federal civil rights laws and [place] laws. We do not discriminate on the basis of race, color, national origin, age, disability, sex, sexual orientation, or gender identity.            Thank you!     Thank you for choosing [facility]  for your care. Our goal is always to provide you with excellent care. Hearing back from our patients is one way we can continue to improve our services. Please take a few minutes to complete the written survey that you may receive in the mail after your visit with us. Thank  you!             Your Updated Medication List - Protect others around you: Learn how to safely use, store and throw away your medicines at www.disposemymeds.org.          This list is accurate as of: 11/3/17  3:50 PM.  Always use your most recent med list.                   Brand Name Dispense Instructions for use Diagnosis    AMLODIPINE BESYLATE PO      Take 10 mg by mouth daily.        LISINOPRIL PO      Take 40 mg by mouth daily.        methylPREDNISolone sodium succinate 1 g     3 dose.    Inject 1 g into the vein daily for 3 days    Transverse myelitis (H)

## 2017-11-03 NOTE — TELEPHONE ENCOUNTER
Infusion center called [facility]. Symptoms were discussed with Dr. Hart. He ordered an U/S for patient. Patient and Infusion center were informed. Patient will go down for U/S after his infusion.     Alessandra Arauz RN  [facility]

## 2017-11-03 NOTE — TELEPHONE ENCOUNTER
Spouse calls in with sx's.    Left leg, inside of knee area, varicose vein is very tight, popping out more than usual, warm to touch. Painful.  He is on a high dose of prednisone due to transverse myelitis.   Patient is upstairs at [facility], in infusion, cell is [phone].

## 2017-11-05 LAB
BACTERIA SPEC CULT: NO GROWTH
SPECIMEN SOURCE: NORMAL

## 2017-11-06 ENCOUNTER — TELEPHONE (OUTPATIENT)
Dept: FAMILY MEDICINE | Facility: OTHER | Age: 37
End: 2017-11-06

## 2017-11-06 ENCOUNTER — OFFICE VISIT (OUTPATIENT)
Dept: FAMILY MEDICINE | Facility: OTHER | Age: 37
End: 2017-11-06
Payer: COMMERCIAL

## 2017-11-06 VITALS
DIASTOLIC BLOOD PRESSURE: 58 MMHG | TEMPERATURE: 98.2 F | HEART RATE: 84 BPM | SYSTOLIC BLOOD PRESSURE: 118 MMHG | OXYGEN SATURATION: 97 % | RESPIRATION RATE: 18 BRPM | BODY MASS INDEX: 47.54 KG/M2 | WEIGHT: 315 LBS

## 2017-11-06 DIAGNOSIS — I80.02 SUPERFICIAL THROMBOPHLEBITIS OF LEFT LEG: ICD-10-CM

## 2017-11-06 DIAGNOSIS — G37.3 TRANSVERSE MYELITIS (H): ICD-10-CM

## 2017-11-06 DIAGNOSIS — E66.01 MORBID OBESITY (H): Primary | ICD-10-CM

## 2017-11-06 PROCEDURE — 99214 OFFICE O/P EST MOD 30 MIN: CPT | Performed by: FAMILY MEDICINE

## 2017-11-06 RX ORDER — GABAPENTIN 300 MG/1
CAPSULE ORAL
Qty: 90 CAPSULE | Refills: 0 | Status: SHIPPED | OUTPATIENT
Start: 2017-11-06 | End: 2018-12-04

## 2017-11-06 RX ORDER — PREDNISONE 10 MG/1
TABLET ORAL
COMMUNITY
Start: 2017-11-02 | End: 2017-11-30

## 2017-11-06 RX ORDER — TRAMADOL HYDROCHLORIDE 50 MG/1
50-100 TABLET ORAL EVERY 8 HOURS PRN
Qty: 60 TABLET | Refills: 0 | Status: SHIPPED | OUTPATIENT
Start: 2017-11-06 | End: 2021-02-26

## 2017-11-06 ASSESSMENT — PAIN SCALES - GENERAL: PAINLEVEL: MODERATE PAIN (5)

## 2017-11-06 NOTE — TELEPHONE ENCOUNTER
I called this patient and informed him that Kathy Sarah stated she would send some pain medications but he will need to establish care with someone.  He was scheduled with Dr. Horvath at 10:40 so no pain medications have been given at this time.

## 2017-11-06 NOTE — PROGRESS NOTES
SUBJECTIVE:   Dontrell Schulz is a 37 year old male who presents to clinic today for the following health issues:        New Patient/Transfer of Care    Concern: Left leg pain due to current blood clot. Both legs are swollen left leg is worse. Called clinic talked to Tanner and was advised to put a heating pad on leg. Currently on Prednisone and had 5 steroid injections for transverse myelitis.       Problem list and histories reviewed & adjusted, as indicated.  Additional history:   He was recently diagnosed with Transverse Myelitis and is completing oral steroids after steroid infusions. He is starting to get feeling back into legs. He developed left leg pain 3-4 days ago and had US of LE. No DVT but STP. He has been warm packing.   He has history of chronic VV and obesity.     Results for orders placed or performed during the hospital encounter of 11/03/17   US Lower Extremity Venous Duplex Left    Narrative    LEFT LOWER EXTREMITY VENOUS DUPLEX ULTRASOUND   11/3/2017 4:25 PM     HISTORY: Pain in the left lower leg.    COMPARISON: None.    FINDINGS: Gray-scale, color and Doppler spectral analysis ultrasound  was performed of the left lower extremity. Compression and  augmentation imaging was performed.    The deep venous system of the left lower extremity is fully  compressible.  Spectral Doppler demonstrates normal phasicity and  excellent augmentation with calf compression.  Visualized greater  saphenous and deep calf veins are patent.    There is complete occlusive thrombosis of varicose veins along the  medial aspect of the left knee in the region of symptoms. These  varicose veins appear to have a confluence with the greater saphenous  vein although the greater saphenous vein demonstrates no thrombus.      Impression    IMPRESSION:   1. No evidence for DVT within the left lower extremity.  2. Superficial thrombophlebitis along the medial aspect of the left  knee.    Results of superficial  thrombophlebitis, but no deep venous  thrombosis, were called to Dr. Hart by the ultrasound technologist  at the time of the exam.    ROSIO TAYLOR MD         Patient Active Problem List   Diagnosis     Lower extremity numbness     Morbid obesity -- BMI 45.5     Essential hypertension     Transverse myelitis (H)     Past Surgical History:   Procedure Laterality Date     EYE SURGERY      trauma related - Retinal injury       Social History   Substance Use Topics     Smoking status: Current Every Day Smoker     Packs/day: 1.00     Smokeless tobacco: Never Used     Alcohol use Yes      Comment: occas     History reviewed. No pertinent family history.      Current Outpatient Prescriptions   Medication Sig Dispense Refill     predniSONE (DELTASONE) 10 MG tablet        LISINOPRIL PO Take 40 mg by mouth daily.       AMLODIPINE BESYLATE PO Take 10 mg by mouth daily.       Allergies   Allergen Reactions     Nkda [No Known Drug Allergies]      Recent Labs   Lab Test  10/30/17   1311  10/12/17   1430   A1C   --   5.2   CR  0.75   --    GFRESTIMATED  >90   --    GFRESTBLACK  >90   --    POTASSIUM  4.0   --    TSH   --   0.60      BP Readings from Last 3 Encounters:   11/06/17 118/58   11/03/17 112/67   11/02/17 116/50    Wt Readings from Last 3 Encounters:   11/06/17 (!) 350 lb 8 oz (159 kg)   11/02/17 (!) 342 lb 14.4 oz (155.5 kg)   11/01/17 (!) 339 lb (153.8 kg)                          Reviewed and updated as needed this visit by clinical staffTobacco  Allergies  Meds  Problems  Med Hx  Surg Hx  Fam Hx  Soc Hx        Reviewed and updated as needed this visit by Provider  Allergies  Meds  Problems         ROS:  C: NEGATIVE for fever, chills, change in weight  INTEGUMENTARY/SKIN: NEGATIVE for worrisome rashes, moles or lesions and POSITIVE for tenderness medial left knee  E/M: NEGATIVE for ear, mouth and throat problems  R: NEGATIVE for significant cough or SOB  CV: NEGATIVE for chest pain, palpitations or  peripheral edema  NEURO: NEGATIVE for weakness, dizziness or paresthesias, numbness or tingling, paresthesias and radicular pain bilateral LE.  ROS otherwise negative    OBJECTIVE:     /58 (BP Location: Left arm, Patient Position: Chair, Cuff Size: Adult Large)  Pulse 84  Temp 98.2  F (36.8  C) (Temporal)  Resp 18  Wt (!) 350 lb 8 oz (159 kg)  SpO2 97%  BMI 47.54 kg/m2  Body mass index is 47.54 kg/(m^2).  GENERAL: alert, no distress and obese  NECK: no adenopathy, no asymmetry, masses, or scars and thyroid normal to palpation  RESP: lungs clear to auscultation - no rales, rhonchi or wheezes  CV: regular rates and rhythm, normal S1 S2, no S3 or S4, no murmur, click or rub, peripheral pulses strong, 1+ bilateral lower extremity pitting edema to mid calf   and varicosities with superficial thrombophlebitis to medial left knee and thigh.  ABDOMEN: soft, nontender, no hepatosplenomegaly, no masses and bowel sounds normal  MS: no gross musculoskeletal defects noted, no edema    Diagnostic Test Results:  none     ASSESSMENT/PLAN:       1. Morbid obesity -- BMI 45.5  BMI:   Estimated body mass index is 47.54 kg/(m^2) as calculated from the following:    Height as of 10/30/17: 6' (1.829 m).    Weight as of this encounter: 350 lb 8 oz (159 kg).   Weight management plan: Patient was referred to their PCP to discuss a diet and exercise plan.        2. Superficial thrombophlebitis of left leg  Acute STP without evidence for DVT on US. Reassurance. Elevate leg, Warm pack, use compression stockings and will add Ultram for short term pain relief. Will refer to Dr. Kingsley for vascular assessment due to chronic varicose veins. Importance of weight loss discussed.   - traMADol (ULTRAM) 50 MG tablet; Take 1-2 tablets ( mg) by mouth every 8 hours as needed for pain maximum 6 tablet(s) per day  Dispense: 60 tablet; Refill: 0  - order for DME; Thigh High HEIDI stockings  Dispense: 1 each; Refill: 1  - VASCULAR SURGERY  REFERRAL    3. Transverse myelitis (H)  Acute with recent steroid infusion and now completing oral steroid taper. He is developing increase sensation in LE with associated dysesthesia. He has follow up with Neurology in 2 weeks. Will start gabapentin for neuropathic pain. May need to consider tegretol or TCA for chronic pain.    - gabapentin (NEURONTIN) 300 MG capsule; Take 1 tablet (300 mg) every night for 1-3 days, then 1 tablet twice daily for 1-3 days, then 1 tablet three times daily  Dispense: 90 capsule; Refill: 0    CONSULTATION/REFERRAL to Vascular Surgery  Work on weight loss      Raleigh Horvath MD  [facility]

## 2017-11-06 NOTE — NURSING NOTE
Chief Complaint   Patient presents with     Establish Care       Initial /58 (BP Location: Left arm, Patient Position: Chair, Cuff Size: Adult Large)  Pulse 84  Temp 98.2  F (36.8  C) (Temporal)  Resp 18  Wt (!) 350 lb 8 oz (159 kg)  SpO2 97%  BMI 47.54 kg/m2 Estimated body mass index is 47.54 kg/(m^2) as calculated from the following:    Height as of 10/30/17: 6' (1.829 m).    Weight as of this encounter: 350 lb 8 oz (159 kg).  Medication Reconciliation: complete     Gill Hill MA 11/6/2017  10:50 AM

## 2017-11-06 NOTE — TELEPHONE ENCOUNTER
Reason for Call:  Other     Detailed comments: Dontrell has been seen for a blood clot in his left leg. Has three days left of being on a steroid and still has terrible pain. He has been using a heating pad and would like to know if there is anything else he can do for the pain.     Phone Number Patient can be reached at: Home number on file [phone] (home)    Best Time: *     Can we leave a detailed message on this number? YES    Call taken on 11/6/2017 at 8:17 AM by Leila Lewis

## 2017-11-07 ENCOUNTER — TELEPHONE (OUTPATIENT)
Dept: INTERNAL MEDICINE | Facility: CLINIC | Age: 37
End: 2017-11-07

## 2017-11-07 NOTE — TELEPHONE ENCOUNTER
----- Message from Yvan Hart DO sent at 11/6/2017 11:05 PM CST -----    Please contact the patient and notify him of the following:  The Doppler study of the lower extremity demonstrates no clot.  A little bit of thrombophlebitis is noted.  Patient should follow-up with the provider of his choice this week.    Thank you.  DO EDUARDO NielsenOI

## 2017-11-07 NOTE — PROGRESS NOTES
Please contact the patient and notify him of the following:  The Doppler study of the lower extremity demonstrates no clot.  A little bit of thrombophlebitis is noted.  Patient should follow-up with the provider of his choice this week.    Thank you.  DO RUDOLPH Nielsen

## 2017-11-07 NOTE — TELEPHONE ENCOUNTER
Patient notified of MD message. He has already been seen by Dr. Horvath yesterday.  No questions or concerns.  Brian Hatch MA

## 2017-11-22 ENCOUNTER — HOSPITAL ENCOUNTER (OUTPATIENT)
Dept: MRI IMAGING | Facility: CLINIC | Age: 37
End: 2017-11-22
Attending: PSYCHIATRY & NEUROLOGY
Payer: COMMERCIAL

## 2017-11-22 ENCOUNTER — HOSPITAL ENCOUNTER (OUTPATIENT)
Dept: MRI IMAGING | Facility: CLINIC | Age: 37
Discharge: HOME | End: 2017-11-22
Attending: PSYCHIATRY & NEUROLOGY | Admitting: PSYCHIATRY & NEUROLOGY
Payer: COMMERCIAL

## 2017-11-22 DIAGNOSIS — G37.3 TRANSVERSE MYELITIS (H): ICD-10-CM

## 2017-11-22 PROCEDURE — A9585 GADOBUTROL INJECTION: HCPCS | Performed by: RADIOLOGY

## 2017-11-22 PROCEDURE — 72156 MRI NECK SPINE W/O & W/DYE: CPT

## 2017-11-22 PROCEDURE — 25000128 H RX IP 250 OP 636: Performed by: RADIOLOGY

## 2017-11-22 PROCEDURE — 72157 MRI CHEST SPINE W/O & W/DYE: CPT

## 2017-11-22 RX ORDER — GADOBUTROL 604.72 MG/ML
10 INJECTION INTRAVENOUS ONCE
Status: DISCONTINUED | OUTPATIENT
Start: 2017-11-22 | End: 2017-11-22 | Stop reason: CLARIF

## 2017-11-22 RX ORDER — GADOBUTROL 604.72 MG/ML
15 INJECTION INTRAVENOUS ONCE
Status: COMPLETED | OUTPATIENT
Start: 2017-11-22 | End: 2017-11-22

## 2017-11-22 RX ADMIN — GADOBUTROL 15 ML: 604.72 INJECTION INTRAVENOUS at 09:03

## 2017-11-30 ENCOUNTER — OFFICE VISIT (OUTPATIENT)
Dept: SURGERY | Facility: CLINIC | Age: 37
End: 2017-11-30
Payer: COMMERCIAL

## 2017-11-30 VITALS — OXYGEN SATURATION: 97 % | TEMPERATURE: 97.7 F | HEART RATE: 85 BPM

## 2017-11-30 DIAGNOSIS — G37.3 TRANSVERSE MYELITIS (H): Primary | ICD-10-CM

## 2017-11-30 DIAGNOSIS — I83.813 VARICOSE VEINS OF BOTH LOWER EXTREMITIES WITH PAIN: Primary | ICD-10-CM

## 2017-11-30 DIAGNOSIS — I83.893 VARICOSE VEINS OF BOTH LEGS WITH EDEMA: ICD-10-CM

## 2017-11-30 DIAGNOSIS — I80.02 SUPERFICIAL PHLEBITIS OF LEFT LEG: ICD-10-CM

## 2017-11-30 PROCEDURE — 99203 OFFICE O/P NEW LOW 30 MIN: CPT | Performed by: SPECIALIST

## 2017-11-30 NOTE — PROGRESS NOTES
Consult requested by Dr. Horvath    Reason for consultation varicose veins      HPI:  Patient is a 37-year-old white male presenting with a many year history of bilateral varicose veins. He works pouring concrete and stands all day. Over the past year they've been progressively worsening to the point he complains of some bilateral edema at the end of the day as well as pain in the veins. He also developed more pain than usual on the medial cluster in the left leg was diagnosed with superficial phlebitis. He denies any prior DVT, leg trauma, nonhealing wounds or family history of varicose veins. He has not worn compression socks nor had an ultrasound for reflux. He now presents for evaluation of his varicose veins.    Past Medical History:   Diagnosis Date     Hypertension      Past Surgical History:   Procedure Laterality Date     EYE SURGERY      trauma related - Retinal injury     Current Outpatient Prescriptions   Medication     gabapentin (NEURONTIN) 300 MG capsule     traMADol (ULTRAM) 50 MG tablet     order for DME     LISINOPRIL PO     AMLODIPINE BESYLATE PO     No current facility-administered medications for this visit.         Allergies   Allergen Reactions     Nkda [No Known Drug Allergies]      Social History   Substance Use Topics     Smoking status: Current Every Day Smoker     Packs/day: 1.00     Smokeless tobacco: Never Used     Alcohol use Yes      Comment: occas     No family history on file.       ROS: 10 point ROS neg other than the symptoms noted above in the HPI.    PE:  B/P: Data Unavailable, T: 97.7, P: 85, R: Data Unavailable  General: well developed, well nourished WM who appears his stated age  HEENT: NC/AT, EOMI, (-)icterus, (-)injection  Neck: Supple, No JVD  Chest: CTA  Heart: S1, S2, (-)m/r/g  Abd: Soft, non tender, non distended  Ext; Warm, (+)DP pulse bilaterally,  +1 edema on right, +2 left.  Bilateral varicose veins medial thigh and calf L>>R,  No cvsd changes.  Superficial  phlebitis at knee.    Psych: AAOx3  Neuro: No focal deficits    U/S -   LEFT LOWER EXTREMITY VENOUS DUPLEX ULTRASOUND   11/3/2017 4:25 PM      HISTORY: Pain in the left lower leg.     COMPARISON: None.     FINDINGS: Gray-scale, color and Doppler spectral analysis ultrasound  was performed of the left lower extremity. Compression and  augmentation imaging was performed.     The deep venous system of the left lower extremity is fully  compressible.  Spectral Doppler demonstrates normal phasicity and  excellent augmentation with calf compression.  Visualized greater  saphenous and deep calf veins are patent.     There is complete occlusive thrombosis of varicose veins along the  medial aspect of the left knee in the region of symptoms. These  varicose veins appear to have a confluence with the greater saphenous  vein although the greater saphenous vein demonstrates no thrombus.         IMPRESSION:   1. No evidence for DVT within the left lower extremity.  2. Superficial thrombophlebitis along the medial aspect of the left  knee.     Results of superficial thrombophlebitis, but no deep venous  thrombosis, were called to Dr. Hart by the ultrasound technologist  at the time of the exam.     ROSIO TAYLOR MD      Impression/Plan:  This is a 37-year-old gentleman with bilateral symptomatic varicose veins with the left being worse than the right. He also currently has a superficial phlebitis on the left. He is a CEAP 3. He has not had a trial of compression therapy. I discussed the etiology of his varicose veins as well as the superficial phlebitis and he expressed understanding. The plan at this time is to repeat his ultrasound for reflux as well as to ensure the superficial phlebitis has not progressed. He'll also be started and compression socks. He will call me when the ultrasound is completed I'll review with him over the phone. He will then follow-up with me after a 3 month trial of compression  milagros.      Vidal Kingsley MD, FACS

## 2017-11-30 NOTE — NURSING NOTE
[facility]  Services    Dontrell Schulz has been given the following teaching information:    Aydee: Understanding Vein Problems and procedures for Varicose veins  [facility]in solution phamplet with [email or web address] card attached   Compression sock order with measurements faxed to Leyda at [facility]-patient informed they will send order to [facility] medical  Radiology call back number to call schedule US-with written and verbal instructions

## 2017-11-30 NOTE — NURSING NOTE
Chief Complaint   Patient presents with     Varicose Vein     bilateral lower extremity varicose veins     Consult     referring chambers       Initial Pulse 85  Temp 97.7  F (36.5  C) (Temporal)  SpO2 97% Estimated body mass index is 47.54 kg/(m^2) as calculated from the following:    Height as of 10/30/17: 6' (1.829 m).    Weight as of 11/6/17: 350 lb 8 oz (159 kg).  Medication Reconciliation: complete

## 2017-11-30 NOTE — MR AVS SNAPSHOT
"              After Visit Summary   2017    Dontrell Schulz    MRN: [id number]           Patient Information     Date Of Birth          1980        Visit Information        Provider Department      2017 11:15 AM Vidal Kingsley MD [facility]         Follow-ups after your visit        Who to contact     If you have questions or need follow up information about today's clinic visit or your schedule please contact [facility] directly at [phone].  Normal or non-critical lab and imaging results will be communicated to you by Nayatekhart, letter or phone within 4 business days after the clinic has received the results. If you do not hear from us within 7 days, please contact the clinic through Nayatekhart or phone. If you have a critical or abnormal lab result, we will notify you by phone as soon as possible.  Submit refill requests through [email or web address] or call your pharmacy and they will forward the refill request to us. Please allow 3 business days for your refill to be completed.          Additional Information About Your Visit        [facility] Information     [email or web address] lets you send messages to your doctor, view your test results, renew your prescriptions, schedule appointments and more. To sign up, go to [email or web address] . Click on \"Log in\" on the left side of the screen, which will take you to the Welcome page. Then click on \"Sign up Now\" on the right side of the page.     You will be asked to enter the access code listed below, as well as some personal information. Please follow the directions to create your username and password.     Your access code is: [email or web address]  Expires: 1/10/2018  2:01 PM     Your access code will  in 90 days. If you need help or a new code, please call your [facility] or [phone].        Care EveryWhere ID     This is your Care EveryWhere ID. This could be used by other organizations to access your [facility] " records  [id number]        Your Vitals Were     Pulse Temperature Pulse Oximetry             85 97.7  F (36.5  C) (Temporal) 97%          Blood Pressure from Last 3 Encounters:   11/06/17 118/58   11/03/17 112/67   11/02/17 116/50    Weight from Last 3 Encounters:   11/06/17 (!) 350 lb 8 oz (159 kg)   11/02/17 (!) 342 lb 14.4 oz (155.5 kg)   11/01/17 (!) 339 lb (153.8 kg)              Today, you had the following     No orders found for display       Primary Care Provider Office Phone # Fax #    [facility] [phone] [address] [facility] [address]        Equal Access to Services     JUAN BAEZ : Pennie wreno Soomaali, waaxda luqadaha, qaybta kaalmada adeegyada, amarjit calvert. So [facility] [phone].    ATENCIÓN: Si habla español, tiene a burton disposición servicios gratuitos de asistencia lingüística. Llame al [phone].    We comply with applicable federal civil rights laws and [place] laws. We do not discriminate on the basis of race, color, national origin, age, disability, sex, sexual orientation, or gender identity.            Thank you!     Thank you for choosing [facility]  for your care. Our goal is always to provide you with excellent care. Hearing back from our patients is one way we can continue to improve our services. Please take a few minutes to complete the written survey that you may receive in the mail after your visit with us. Thank you!             Your Updated Medication List - Protect others around you: Learn how to safely use, store and throw away your medicines at www.disposemymeds.org.          This list is accurate as of: 11/30/17 11:58 AM.  Always use your most recent med list.                   Brand Name Dispense Instructions for use Diagnosis    AMLODIPINE BESYLATE PO      Take 10 mg by mouth daily.        gabapentin 300 MG capsule    NEURONTIN    90 capsule    Take 1 tablet (300 mg)  every night for 1-3 days, then 1 tablet twice daily for 1-3 days, then 1 tablet three times daily    Transverse myelitis (H)       LISINOPRIL PO      Take 40 mg by mouth daily.        order for DME     1 each    Thigh High HEIDI stockings    Superficial thrombophlebitis of left leg       traMADol 50 MG tablet    ULTRAM    60 tablet    Take 1-2 tablets ( mg) by mouth every 8 hours as needed for pain maximum 6 tablet(s) per day    Superficial thrombophlebitis of left leg

## 2017-12-13 ENCOUNTER — HOSPITAL ENCOUNTER (OUTPATIENT)
Dept: ULTRASOUND IMAGING | Facility: CLINIC | Age: 37
Discharge: HOME | End: 2017-12-13
Attending: SPECIALIST | Admitting: SPECIALIST
Payer: COMMERCIAL

## 2017-12-13 ENCOUNTER — TRANSFERRED RECORDS (OUTPATIENT)
Dept: HEALTH INFORMATION MANAGEMENT | Facility: CLINIC | Age: 37
End: 2017-12-13

## 2017-12-13 DIAGNOSIS — I80.02 SUPERFICIAL PHLEBITIS OF LEFT LEG: ICD-10-CM

## 2017-12-13 DIAGNOSIS — I83.893 VARICOSE VEINS OF BOTH LEGS WITH EDEMA: ICD-10-CM

## 2017-12-13 DIAGNOSIS — I83.813 VARICOSE VEINS OF BOTH LOWER EXTREMITIES WITH PAIN: ICD-10-CM

## 2017-12-13 PROCEDURE — 93970 EXTREMITY STUDY: CPT

## 2017-12-18 ENCOUNTER — TELEPHONE (OUTPATIENT)
Dept: SURGERY | Facility: CLINIC | Age: 37
End: 2017-12-18

## 2017-12-18 NOTE — TELEPHONE ENCOUNTER
Per RK last office visit note he will contact patient with US results................................    RK to call patient at [phone]...........................................message routed to Dr. Kingsley..................................................................Krista Brooks CMA  ([facility])

## 2017-12-18 NOTE — TELEPHONE ENCOUNTER
Reviewed US with patient - still awaiting socks.  - Will contact [facility] to f/u.  F/U with me 3 months after starting socks.

## 2017-12-18 NOTE — TELEPHONE ENCOUNTER
VM left for Leyda at [facility] to check on status of sock order, as order was sent to [facility]..................................Krista Brooks CMA  ([facility])

## 2017-12-18 NOTE — TELEPHONE ENCOUNTER
Call back to [facility] spoke to Krista at [facility] who states sock order was sent to [facility], patient should contact [facility].   Patient called and given number to [facility].....................Krista Brooks CMA  ([facility])

## 2018-04-09 ENCOUNTER — TRANSFERRED RECORDS (OUTPATIENT)
Dept: HEALTH INFORMATION MANAGEMENT | Facility: CLINIC | Age: 38
End: 2018-04-09

## 2018-04-26 ENCOUNTER — OFFICE VISIT (OUTPATIENT)
Dept: SURGERY | Facility: CLINIC | Age: 38
End: 2018-04-26
Payer: COMMERCIAL

## 2018-04-26 VITALS
BODY MASS INDEX: 45.71 KG/M2 | HEART RATE: 85 BPM | TEMPERATURE: 97.9 F | SYSTOLIC BLOOD PRESSURE: 130 MMHG | DIASTOLIC BLOOD PRESSURE: 78 MMHG | WEIGHT: 315 LBS | OXYGEN SATURATION: 99 %

## 2018-04-26 DIAGNOSIS — I83.813 VARICOSE VEINS OF BOTH LOWER EXTREMITIES WITH PAIN: Primary | ICD-10-CM

## 2018-04-26 DIAGNOSIS — I83.893 VARICOSE VEINS OF BOTH LEGS WITH EDEMA: ICD-10-CM

## 2018-04-26 PROCEDURE — 99213 OFFICE O/P EST LOW 20 MIN: CPT | Performed by: SPECIALIST

## 2018-04-26 NOTE — PATIENT INSTRUCTIONS

## 2018-04-26 NOTE — NURSING NOTE
Request for Vein Surgery faxed to [facility]  Consent form reviewed and signed-given to RK he will bring to [facility]  New patient [facility] packet given to patient  LOV and US with tech sheet sent to Leyda at [facility]

## 2018-04-26 NOTE — MR AVS SNAPSHOT
After Visit Summary   4/26/2018    Dontrell Schulz    MRN: [id number]           Patient Information     Date Of Birth          1980        Visit Information        Provider Department      4/26/2018 7:30 AM Vidal Kingsley MD [facility]        Today's Diagnoses     Varicose veins of both lower extremities with pain    -  1    Varicose veins of both legs with edema          Care Instructions      How to Quit Smoking  Smoking is one of the hardest habits to break. About half of all people who have ever smoked have been able to quit. Most people who still smoke want to quit. Here are some of the best ways to stop smoking.    Keep trying  Most smokers make many attempts at quitting before they are successful. It s important not to give up.  Go cold turkey  Most former smokers quit cold turkey (all at once). Trying to cut back gradually doesn't seem to work as well, perhaps because it continues the smoking habit. Also, it is possible to inhale more while smoking fewer cigarettes. This results in the same amount of nicotine in your body.  Get support  Support programs can be a big help, especially for heavy smokers. These groups offer lectures, ways to change behavior, and peer support. Here are some ways to find a support program:    Free national quitline: 800-QUIT-NOW (544-938-8009).    Hospital quit-smoking programs.    American Lung Association: (879.582.4291).    American Cancer Society (487-673-6580).  Support at home is important too. Nonsmokers can offer praise and encouragement. If the smoker in your life finds it hard to quit, encourage them to keep trying.  Over-the-counter medicines  Nicotine replacement therapy may make quitting easier. Certain aids, such as the nicotine patch, gum, and lozenges, are available without a prescription. It is best to use these under a doctor s care, though. The skin patch provides a steady supply of nicotine. Nicotine gum and lozenges  "give temporary bursts of low levels of nicotine. Both methods reduce the craving for cigarettes. Warning: If you have nausea, vomiting, dizziness, weakness, or a fast heartbeat, stop using these products and see your doctor.  Prescription medicines  After reviewing your smoking patterns and past attempts to quit, your doctor may offer a prescription medicine such as bupropion, varenicline, a nicotine inhaler, or nasal spray. Each has advantages and side effects. Your doctor can review these with you.  Health benefits of quitting  The benefits of quitting start right away and keep improving the longer you go without smoking. These benefits occur at any age.  So whether you are 17 or 70, quitting is a good decision. Some of the benefits include:    20 minutes: Blood pressure and pulse return to normal.    8 hours: Oxygen levels return to normal.    2 days: Ability to smell and taste begin to improve as damaged nerves regrow.    2 to 3 weeks: Circulation and lung function improve.    1 to 9 months: Coughing, congestion, and shortness of breath decrease; tiredness decreases.    1 year: Risk of heart attack decreases by half.    5 years: Risk of lung cancer decreases by half; risk of stroke becomes the same as a nonsmoker s.  For more on how to quit smoking, try these online resources:     Smokefree.gov    \"Clearing the Air\" booklet from the National Cancer [place]: smokefree.gov/sites/default/files/pdf/clearing-the-air-accessible.pdf  Date Last Reviewed: 3/1/2017    7651-6695 [employer]. [address] [place] [address], [place] [address]. All rights reserved. This information is not intended as a substitute for professional medical care. Always follow your healthcare professional's instructions.                Follow-ups after your visit        Who to contact     If you have questions or need follow up information about today's clinic visit or your schedule please contact [facility] directly at " "[phone].  Normal or non-critical lab and imaging results will be communicated to you by MyChart, letter or phone within 4 business days after the clinic has received the results. If you do not hear from us within 7 days, please contact the clinic through [employer] or phone. If you have a critical or abnormal lab result, we will notify you by phone as soon as possible.  Submit refill requests through [employer] or call your pharmacy and they will forward the refill request to us. Please allow 3 business days for your refill to be completed.          Additional Information About Your Visit        [employer] Information     [employer] lets you send messages to your doctor, view your test results, renew your prescriptions, schedule appointments and more. To sign up, go to [email or web address] . Click on \"Log in\" on the left side of the screen, which will take you to the Welcome page. Then click on \"Sign up Now\" on the right side of the page.     You will be asked to enter the access code listed below, as well as some personal information. Please follow the directions to create your username and password.     Your access code is: [email or web address]  Expires: 2018 11:30 AM     Your access code will  in 90 days. If you need help or a new code, please call your [facility] clinic or [phone].        Care EveryWhere ID     This is your Care EveryWhere ID. This could be used by other organizations to access your [facility] medical records  [id number]        Your Vitals Were     Pulse Temperature Pulse Oximetry BMI (Body Mass Index)          85 97.9  F (36.6  C) (Temporal) 99% 45.71 kg/m2         Blood Pressure from Last 3 Encounters:   18 130/78   17 118/58   17 112/67    Weight from Last 3 Encounters:   18 (!) 152.9 kg (337 lb)   17 (!) 159 kg (350 lb 8 oz)   17 (!) 155.5 kg (342 lb 14.4 oz)              Today, you had the following     No orders found for display       Primary Care " Provider Office Phone # Fax #    [facility] [phone] [address] [address]        Equal Access to Services     JUAN BAEZ : Hadii aad ku hadbrucemarcela Solilly, wamoisesda luqadaha, qanaomita kaalmada zachariah, amarjit hare minibalaji broussard syed calvert. So [facility] [phone].    ATENCIÓN: Si habla español, tiene a burton disposición servicios gratuitos de asistencia lingüística. Llame al [phone].    We comply with applicable federal civil rights laws and [place] laws. We do not discriminate on the basis of race, color, national origin, age, disability, sex, sexual orientation, or gender identity.            Thank you!     Thank you for choosing [facility]  for your care. Our goal is always to provide you with excellent care. Hearing back from our patients is one way we can continue to improve our services. Please take a few minutes to complete the written survey that you may receive in the mail after your visit with us. Thank you!             Your Updated Medication List - Protect others around you: Learn how to safely use, store and throw away your medicines at www.disposemymeds.org.          This list is accurate as of 4/26/18  8:28 AM.  Always use your most recent med list.                   Brand Name Dispense Instructions for use Diagnosis    AMLODIPINE BESYLATE PO      Take 10 mg by mouth daily.        gabapentin 300 MG capsule    NEURONTIN    90 capsule    Take 1 tablet (300 mg) every night for 1-3 days, then 1 tablet twice daily for 1-3 days, then 1 tablet three times daily    Transverse myelitis (H)       LISINOPRIL PO      Take 40 mg by mouth daily.        order for DME     1 each    Thigh High HEIDI stockings    Superficial thrombophlebitis of left leg       traMADol 50 MG tablet    ULTRAM    60 tablet    Take 1-2 tablets ( mg) by mouth every 8 hours as needed for pain maximum 6 tablet(s) per day    Superficial thrombophlebitis of left  leg

## 2018-04-26 NOTE — NURSING NOTE
Chief Complaint   Patient presents with     RECHECK     Varicose Veins, 3month follow-up/wearing compression socks       Initial /78 (BP Location: Left arm, Patient Position: Sitting, Cuff Size: Adult Large)  Pulse 85  Temp 97.9  F (36.6  C) (Temporal)  Wt (!) 152.9 kg (337 lb)  SpO2 99%  BMI 45.71 kg/m2 Estimated body mass index is 45.71 kg/(m^2) as calculated from the following:    Height as of 10/30/17: 1.829 m (6').    Weight as of this encounter: 152.9 kg (337 lb).  Medication Reconciliation: complete

## 2018-04-26 NOTE — PROGRESS NOTES
F/U for varicose veins      Patient is a 37-year-old white male presenting with a many year history of bilateral varicose veins. He works pouring concrete and stands all day. Over the past year they've been progressively worsening to the point he complains of some bilateral edema at the end of the day as well as pain in the veins. He also developed more pain than usual on the medial cluster in the left leg was diagnosed with superficial phlebitis. He denies any prior DVT, leg trauma, nonhealing wounds or family history of varicose veins.       He has been wearing thigh high compression socks.  Continues to have pain at end of day despite compression.  Now reports right is worse than left.  Beginning to affect his ability to work.        Objective:  B/P: 130/78, T: 97.9, P: 85, R: Data Unavailable  Ext; Warm, (+)DP pulse bilaterally,  Trace Edema.  Bilateral varicose veins medial thigh and calf L>>R,  No cvsd changes.  Superficial phlebitis at knee resolved.        U/S -   LEFT LOWER EXTREMITY VENOUS DUPLEX ULTRASOUND   11/3/2017 4:25 PM      HISTORY: Pain in the left lower leg.     COMPARISON: None.     FINDINGS: Gray-scale, color and Doppler spectral analysis ultrasound  was performed of the left lower extremity. Compression and  augmentation imaging was performed.     The deep venous system of the left lower extremity is fully  compressible.  Spectral Doppler demonstrates normal phasicity and  excellent augmentation with calf compression.  Visualized greater  saphenous and deep calf veins are patent.     There is complete occlusive thrombosis of varicose veins along the  medial aspect of the left knee in the region of symptoms. These  varicose veins appear to have a confluence with the greater saphenous  vein although the greater saphenous vein demonstrates no thrombus.         IMPRESSION:   1. No evidence for DVT within the left lower extremity.  2. Superficial thrombophlebitis along the medial aspect of the  left  knee.     Results of superficial thrombophlebitis, but no deep venous  thrombosis, were called to Dr. Hart by the ultrasound technologist  at the time of the exam.     ROSIO TAYLOR MD    ULTRASOUND  VENOUS COMPETENCY BILATERAL 12/13/2017 2:41 PM     HISTORY: 37-year-old with varicosities of both lower extremities,  associated with pain.     COMPARISON: None.     TECHNIQUE: Color Doppler and spectral waveform analysis performed  throughout the deep and superficial veins of both lower extremities.     FINDINGS: Negative for deep venous thrombosis throughout both lower  extremities.     The right lower extremity deep venous system is competent. The right  greater saphenous vein from the proximal to the distal thigh is  incompetent for 2.1 to 4 seconds. Overall diameters range from 0.2 to  0.7 cm. Depth from skin surface ranges from 0.9 to 3.2 mm. The small  saphenous vein in the proximal calf is incompetent for 1.4 seconds,  remaining segments are competent. Overall diameters range from 0.1 to  0.7 mm. Depth from skin surface ranges from 0.2 to 2.6 cm. A   branch is noted 25 cm from the medial malleolus, 0.2 cm in  diameter and competent.     The left popliteal vein is incompetent for 2.1 seconds, remaining deep  veins are competent. The greater saphenous vein is diffusely  competent. However, the greater saphenous vein from the mid thigh to  the level of the knee is thrombosed with adjacent thrombosed  varicosities. Overall diameters of the greater saphenous vein ranges  from 0.2 to 0.7 cm in depth from skin surface ranges from 0.7 to 3.1  cm. The small saphenous vein is patent and competent ranging from 0.2  to 0.6 cm in depth from skin surface ranging from 0.5 to 2.6 cm. No  perforating branches identified. Varicosities noted at the level of  the knees bilaterally.         IMPRESSION:  1. Negative for deep venous thrombosis in both lower extremities.  2. Superficial thrombophlebitis of the left  greater saphenous vein  from the mid thigh to the level of the knee with associated thrombosed  varicosities.  3. The left popliteal vein is incompetent, remaining deep veins are  competent bilaterally.  4. The right greater saphenous vein is incompetent throughout the  thigh, the remaining segments are competent. The left greater  saphenous vein is competent in the patent segments.  5. The right small saphenous vein is incompetent in the proximal  thigh, the remaining segments are competent. The left small saphenous  vein is patent and competent.  6. Single competent right  branch is identified 25 cm from  the medial malleolus.     ANDREA LYONS MD    Assessment/Plan:  This is a 37-year-old gentleman with bilateral symptomatic varicose veins with the right being worse than the left. His superficial phlebitis on the left has resolved. He is a CEAP 3.   He continues to have symptoms despite compression therapy beginning to affect his ability work.  His US revealed rt GSV reflux and bilateral varicose veins.    Based on his ultrasound he is a candidate for a right greater saphenous vein radiofrequency ablation with bilateral medically necessary stab phlebectomies.the procedure, risks, benefits and alternatives discussed and agrees to proceed. He'll be scheduled once insurance approval is obtained.      Vidal Kingsley MD, FACS

## 2018-04-26 NOTE — LETTER
4/26/2018         RE: Dontrell Schulz  [id number]  [facility] [address]        Dear Colleague,    Thank you for referring your patient, Dontrell Schulz, to the [facility]. Please see a copy of my visit note below.    F/U for varicose veins      Patient is a 37-year-old white male presenting with a many year history of bilateral varicose veins. He works pouring concrete and stands all day. Over the past year they've been progressively worsening to the point he complains of some bilateral edema at the end of the day as well as pain in the veins. He also developed more pain than usual on the medial cluster in the left leg was diagnosed with superficial phlebitis. He denies any prior DVT, leg trauma, nonhealing wounds or family history of varicose veins.       He has been wearing thigh high compression socks.  Continues to have pain at end of day despite compression.  Now reports right is worse than left.  Beginning to affect his ability to work.        Objective:  B/P: 130/78, T: 97.9, P: 85, R: Data Unavailable  Ext; Warm, (+)DP pulse bilaterally,  Trace Edema.  Bilateral varicose veins medial thigh and calf L>>R,  No cvsd changes.  Superficial phlebitis at knee resolved.        U/S -   LEFT LOWER EXTREMITY VENOUS DUPLEX ULTRASOUND   11/3/2017 4:25 PM      HISTORY: Pain in the left lower leg.     COMPARISON: None.     FINDINGS: Gray-scale, color and Doppler spectral analysis ultrasound  was performed of the left lower extremity. Compression and  augmentation imaging was performed.     The deep venous system of the left lower extremity is fully  compressible.  Spectral Doppler demonstrates normal phasicity and  excellent augmentation with calf compression.  Visualized greater  saphenous and deep calf veins are patent.     There is complete occlusive thrombosis of varicose veins along the  medial aspect of the left knee in the region of symptoms. These  varicose veins appear to have a  confluence with the greater saphenous  vein although the greater saphenous vein demonstrates no thrombus.         IMPRESSION:   1. No evidence for DVT within the left lower extremity.  2. Superficial thrombophlebitis along the medial aspect of the left  knee.     Results of superficial thrombophlebitis, but no deep venous  thrombosis, were called to Dr. Hart by the ultrasound technologist  at the time of the exam.     ROSIO TAYLOR MD    ULTRASOUND  VENOUS COMPETENCY BILATERAL 12/13/2017 2:41 PM     HISTORY: 37-year-old with varicosities of both lower extremities,  associated with pain.     COMPARISON: None.     TECHNIQUE: Color Doppler and spectral waveform analysis performed  throughout the deep and superficial veins of both lower extremities.     FINDINGS: Negative for deep venous thrombosis throughout both lower  extremities.     The right lower extremity deep venous system is competent. The right  greater saphenous vein from the proximal to the distal thigh is  incompetent for 2.1 to 4 seconds. Overall diameters range from 0.2 to  0.7 cm. Depth from skin surface ranges from 0.9 to 3.2 mm. The small  saphenous vein in the proximal calf is incompetent for 1.4 seconds,  remaining segments are competent. Overall diameters range from 0.1 to  0.7 mm. Depth from skin surface ranges from 0.2 to 2.6 cm. A   branch is noted 25 cm from the medial malleolus, 0.2 cm in  diameter and competent.     The left popliteal vein is incompetent for 2.1 seconds, remaining deep  veins are competent. The greater saphenous vein is diffusely  competent. However, the greater saphenous vein from the mid thigh to  the level of the knee is thrombosed with adjacent thrombosed  varicosities. Overall diameters of the greater saphenous vein ranges  from 0.2 to 0.7 cm in depth from skin surface ranges from 0.7 to 3.1  cm. The small saphenous vein is patent and competent ranging from 0.2  to 0.6 cm in depth from skin surface ranging  from 0.5 to 2.6 cm. No  perforating branches identified. Varicosities noted at the level of  the knees bilaterally.         IMPRESSION:  1. Negative for deep venous thrombosis in both lower extremities.  2. Superficial thrombophlebitis of the left greater saphenous vein  from the mid thigh to the level of the knee with associated thrombosed  varicosities.  3. The left popliteal vein is incompetent, remaining deep veins are  competent bilaterally.  4. The right greater saphenous vein is incompetent throughout the  thigh, the remaining segments are competent. The left greater  saphenous vein is competent in the patent segments.  5. The right small saphenous vein is incompetent in the proximal  thigh, the remaining segments are competent. The left small saphenous  vein is patent and competent.  6. Single competent right  branch is identified 25 cm from  the medial malleolus.     ANDREA LYONS MD    Assessment/Plan:  This is a 37-year-old gentleman with bilateral symptomatic varicose veins with the right being worse than the left. His superficial phlebitis on the left has resolved. He is a CEAP 3.   He continues to have symptoms despite compression therapy beginning to affect his ability work.  His US revealed rt GSV reflux and bilateral varicose veins.    Based on his ultrasound he is a candidate for a right greater saphenous vein radiofrequency ablation with bilateral medically necessary stab phlebectomies.the procedure, risks, benefits and alternatives discussed and agrees to proceed. He'll be scheduled once insurance approval is obtained.      Vidal Kingsley MD, FACS    Again, thank you for allowing me to participate in the care of your patient.        Sincerely,        Vidal Kingsley MD

## 2018-05-09 ENCOUNTER — APPOINTMENT (OUTPATIENT)
Dept: VASCULAR SURGERY | Facility: CLINIC | Age: 38
End: 2018-05-09
Payer: COMMERCIAL

## 2018-05-09 PROCEDURE — 37765 STAB PHLEB VEINS XTR 10-20: CPT | Mod: LT | Performed by: SPECIALIST

## 2018-05-09 PROCEDURE — 37766 PHLEB VEINS - EXTREM 20+: CPT | Mod: RT | Performed by: SPECIALIST

## 2018-05-09 PROCEDURE — 36475 ENDOVENOUS RF 1ST VEIN: CPT | Mod: RT | Performed by: SPECIALIST

## 2018-05-11 ENCOUNTER — TRANSFERRED RECORDS (OUTPATIENT)
Dept: HEALTH INFORMATION MANAGEMENT | Facility: CLINIC | Age: 38
End: 2018-05-11

## 2018-05-11 ENCOUNTER — APPOINTMENT (OUTPATIENT)
Dept: VASCULAR SURGERY | Facility: CLINIC | Age: 38
End: 2018-05-11
Payer: COMMERCIAL

## 2018-05-11 PROCEDURE — 99207 ZZC VEINSOLUTIONS NO CHARGE VISIT: CPT | Performed by: SPECIALIST

## 2018-05-11 PROCEDURE — 99207 ZZ C SC UNLISTED SUPPLY OPNP: CPT | Performed by: SPECIALIST

## 2018-05-11 PROCEDURE — 93971 EXTREMITY STUDY: CPT | Performed by: SPECIALIST

## 2018-05-11 PROCEDURE — 99207 ZZC VEINSOLUTIONS 48 HOUR: CPT | Performed by: SPECIALIST

## 2018-05-29 ENCOUNTER — OFFICE VISIT (OUTPATIENT)
Dept: FAMILY MEDICINE | Facility: OTHER | Age: 38
End: 2018-05-29
Payer: COMMERCIAL

## 2018-05-29 VITALS
DIASTOLIC BLOOD PRESSURE: 76 MMHG | WEIGHT: 315 LBS | BODY MASS INDEX: 45.35 KG/M2 | HEART RATE: 92 BPM | RESPIRATION RATE: 20 BRPM | TEMPERATURE: 98.1 F | SYSTOLIC BLOOD PRESSURE: 132 MMHG | OXYGEN SATURATION: 97 %

## 2018-05-29 DIAGNOSIS — Z72.0 TOBACCO USE: ICD-10-CM

## 2018-05-29 DIAGNOSIS — I10 ESSENTIAL HYPERTENSION: Primary | ICD-10-CM

## 2018-05-29 PROCEDURE — 99213 OFFICE O/P EST LOW 20 MIN: CPT | Performed by: PHYSICIAN ASSISTANT

## 2018-05-29 RX ORDER — LISINOPRIL 40 MG/1
40 TABLET ORAL DAILY
Qty: 90 TABLET | Refills: 3 | Status: SHIPPED | OUTPATIENT
Start: 2018-05-29 | End: 2018-12-04

## 2018-05-29 RX ORDER — AMLODIPINE BESYLATE 10 MG/1
10 TABLET ORAL DAILY
Qty: 90 TABLET | Refills: 3 | Status: SHIPPED | OUTPATIENT
Start: 2018-05-29 | End: 2018-12-04

## 2018-05-29 ASSESSMENT — PAIN SCALES - GENERAL: PAINLEVEL: NO PAIN (0)

## 2018-05-29 NOTE — MR AVS SNAPSHOT
After Visit Summary   5/29/2018    Dontrell Schulz    MRN: [id number]           Patient Information     Date Of Birth          1980        Visit Information        Provider Department      5/29/2018 3:20 PM Kathy Sarah PA-C [facility]        Today's Diagnoses     Essential hypertension    -  1      Care Instructions      Controlling High Blood Pressure  High blood pressure (hypertension) is often called the silent killer. This is because many people who have it don t know it. High blood pressure can raise your risk of heart attack, stroke, and heart failure. Controlling your blood pressure can decrease your risk of these problems. Know your blood pressure and remember to check it regularly. Doing so can save your life.  Blood pressure measurements are given as 2 numbers. Systolic blood pressure is the upper number. This is the pressure when the heart contracts. Diastolic blood pressure is the lower number. This is the pressure when the heart relaxes between beats.  Blood pressure is categorized as normal, elevated, or stage 1 or stage 2 high blood pressure:    Normal blood pressure is systolic of less than 120 and diastolic of less than 80 (120/80)    Elevated blood pressure is systolic of 120 to 129 and diastolic less than 80    Stage 1 high blood pressure is systolic is 130 to 139 or diastolic between 80 to 89    Stage 2 high blood pressure is when systolic is 140 or higher or the diastolic is 90 or higher  Here are some things you can do to help control your blood pressure.    Choose heart-healthy foods    Select low-salt, low-fat foods. Limit sodium intake to 2,400 mg per day or the amount suggested by your healthcare provider.    Limit canned, dried, cured, packaged, and fast foods. These can contain a lot of salt.    Eat 8 to 10 servings of fruits and vegetables every day.    Choose lean meats, fish, or chicken.    Eat whole-grain pasta, brown rice, and beans.    Eat 2  to 3 servings of low-fat or fat-free dairy products.    Ask your doctor about the DASH eating plan. This plan helps reduce blood pressure.    When you go to a restaurant, ask that your meal be prepared with no added salt.  Maintain a healthy weight    Ask your healthcare provider how many calories to eat a day. Then stick to that number.    Ask your healthcare provider what weight range is healthiest for you. If you are overweight, a weight loss of only 3% to 5% of your body weight can help lower blood pressure. Generally, a good weight loss goal is to lose 10% of your body weight in a year.    Limit snacks and sweets.    Get regular exercise.  Get up and get active    Choose activities you enjoy. Find ones you can do with friends or family. This includes bicycling, dancing, walking, and jogging.    Park farther away from building entrances.    Use stairs instead of the elevator.    When you can, walk or bike instead of driving.    [place] leaves, garden, or do household repairs.    Be active at a moderate to vigorous level of physical activity for at least 40 minutes for a minimum of 3 to 4 days a week.   Manage stress    Make time to relax and enjoy life. Find time to laugh.    Communicate your concerns with your loved ones and your healthcare provider.    Visit with family and friends, and keep up with hobbies.  Limit alcohol and quit smoking    Men should have no more than 2 drinks per day.    Women should have no more than 1 drink per day.    Talk with your healthcare provider about quitting smoking. Smoking significantly increases your risk for heart disease and stroke. Ask your healthcare provider about community smoking cessation programs and other options.  Medicines  If lifestyle changes aren t enough, your healthcare provider may prescribe high blood pressure medicine. Take all medicines as prescribed. If you have any questions about your medicines, ask your healthcare provider before stopping or changing  them.   Date Last Reviewed: 4/27/2016 2000-2017 [employer]. [address] [place] [address], [place] [address]. All rights reserved. This information is not intended as a substitute for professional medical care. Always follow your healthcare professional's instructions.                Follow-ups after your visit        Follow-up notes from your care team     Return if symptoms worsen or fail to improve.      Your next 10 appointments already scheduled     Jun 22, 2018  8:00 AM CDT   [facility] with Vidal [facility]ley MD   [facility]    [address] [place] [id number] [phone]              Who to contact     If you have questions or need follow up information about today's clinic visit or your schedule please contact [facility] directly at [phone].  Normal or non-critical lab and imaging results will be communicated to you by MyChart, letter or phone within 4 business days after the clinic has received the results. If you do not hear from us within 7 days, please contact the clinic through MyChart or phone. If you have a critical or abnormal lab result, we will notify you by phone as soon as possible.  Submit refill requests through [employer] or call your pharmacy and they will forward the refill request to us. Please allow 3 business days for your refill to be completed.          Additional Information About Your Visit        Care EveryWhere ID     This is your Care EveryWhere ID. This could be used by other organizations to access your [facility] medical records  [id number]        Your Vitals Were     Pulse Temperature Respirations Pulse Oximetry BMI (Body Mass Index)       92 98.1  F (36.7  C) (Tympanic) 20 97% 45.35 kg/m2        Blood Pressure from Last 3 Encounters:   05/29/18 132/76   04/26/18 130/78   11/06/17 118/58    Weight from Last 3 Encounters:   05/29/18 (!) 334 lb 6.4 oz (151.7 kg)   04/26/18 (!) 337 lb (152.9 kg)    11/06/17 (!) 350 lb 8 oz (159 kg)              Today, you had the following     No orders found for display         Today's Medication Changes          These changes are accurate as of 5/29/18  3:34 PM.  If you have any questions, ask your nurse or doctor.               These medicines have changed or have updated prescriptions.        Dose/Directions    amLODIPine 10 MG tablet   Commonly known as:  NORVASC   This may have changed:  medication strength   Used for:  Essential hypertension   Changed by:  Kathy Sarah PA-C        Dose:  10 mg   Take 1 tablet (10 mg) by mouth daily   Quantity:  90 tablet   Refills:  3       lisinopril 40 MG tablet   Commonly known as:  PRINIVIL/ZESTRIL   This may have changed:  medication strength   Used for:  Essential hypertension   Changed by:  Kathy Sarah PA-C        Dose:  40 mg   Take 1 tablet (40 mg) by mouth daily   Quantity:  90 tablet   Refills:  3            Where to get your medicines      These medications were sent to [facility] [place] [address]   [address] [facility] [address]     Phone:  [phone]     amLODIPine 10 MG tablet    lisinopril 40 MG tablet                Primary Care Provider Office Phone # Fax #    [facility] [phone] [address] [facility] [address]        Equal Access to Services     JUAN BAEZ AH: Hadii sandoval ku hadasho Soomaali, waaxda luqadaha, qaybta kaalmada adeegyada, waxay idiin hayaan gwen calvert. So [facility] [phone].    ATENCIÓN: Si habla español, tiene a burton disposición servicios gratuitos de asistencia lingüística. Llame al [phone].    We comply with applicable federal civil rights laws and [place] laws. We do not discriminate on the basis of race, color, national origin, age, disability, sex, sexual orientation, or gender identity.            Thank you!     Thank you for choosing [facility]  for your care. Our goal is always  to provide you with excellent care. Hearing back from our patients is one way we can continue to improve our services. Please take a few minutes to complete the written survey that you may receive in the mail after your visit with us. Thank you!             Your Updated Medication List - Protect others around you: Learn how to safely use, store and throw away your medicines at www.disposemymeds.org.          This list is accurate as of 5/29/18  3:34 PM.  Always use your most recent med list.                   Brand Name Dispense Instructions for use Diagnosis    amLODIPine 10 MG tablet    NORVASC    90 tablet    Take 1 tablet (10 mg) by mouth daily    Essential hypertension       gabapentin 300 MG capsule    NEURONTIN    90 capsule    Take 1 tablet (300 mg) every night for 1-3 days, then 1 tablet twice daily for 1-3 days, then 1 tablet three times daily    Transverse myelitis (H)       lisinopril 40 MG tablet    PRINIVIL/ZESTRIL    90 tablet    Take 1 tablet (40 mg) by mouth daily    Essential hypertension       order for DME     1 each    Thigh High HEIDI stockings    Superficial thrombophlebitis of left leg       traMADol 50 MG tablet    ULTRAM    60 tablet    Take 1-2 tablets ( mg) by mouth every 8 hours as needed for pain maximum 6 tablet(s) per day    Superficial thrombophlebitis of left leg

## 2018-05-29 NOTE — PATIENT INSTRUCTIONS
Controlling High Blood Pressure  High blood pressure (hypertension) is often called the silent killer. This is because many people who have it don t know it. High blood pressure can raise your risk of heart attack, stroke, and heart failure. Controlling your blood pressure can decrease your risk of these problems. Know your blood pressure and remember to check it regularly. Doing so can save your life.  Blood pressure measurements are given as 2 numbers. Systolic blood pressure is the upper number. This is the pressure when the heart contracts. Diastolic blood pressure is the lower number. This is the pressure when the heart relaxes between beats.  Blood pressure is categorized as normal, elevated, or stage 1 or stage 2 high blood pressure:    Normal blood pressure is systolic of less than 120 and diastolic of less than 80 (120/80)    Elevated blood pressure is systolic of 120 to 129 and diastolic less than 80    Stage 1 high blood pressure is systolic is 130 to 139 or diastolic between 80 to 89    Stage 2 high blood pressure is when systolic is 140 or higher or the diastolic is 90 or higher  Here are some things you can do to help control your blood pressure.    Choose heart-healthy foods    Select low-salt, low-fat foods. Limit sodium intake to 2,400 mg per day or the amount suggested by your healthcare provider.    Limit canned, dried, cured, packaged, and fast foods. These can contain a lot of salt.    Eat 8 to 10 servings of fruits and vegetables every day.    Choose lean meats, fish, or chicken.    Eat whole-grain pasta, brown rice, and beans.    Eat 2 to 3 servings of low-fat or fat-free dairy products.    Ask your doctor about the DASH eating plan. This plan helps reduce blood pressure.    When you go to a restaurant, ask that your meal be prepared with no added salt.  Maintain a healthy weight    Ask your healthcare provider how many calories to eat a day. Then stick to that number.    Ask your healthcare  provider what weight range is healthiest for you. If you are overweight, a weight loss of only 3% to 5% of your body weight can help lower blood pressure. Generally, a good weight loss goal is to lose 10% of your body weight in a year.    Limit snacks and sweets.    Get regular exercise.  Get up and get active    Choose activities you enjoy. Find ones you can do with friends or family. This includes bicycling, dancing, walking, and jogging.    Park farther away from building entrances.    Use stairs instead of the elevator.    When you can, walk or bike instead of driving.    [place] leaves, garden, or do household repairs.    Be active at a moderate to vigorous level of physical activity for at least 40 minutes for a minimum of 3 to 4 days a week.   Manage stress    Make time to relax and enjoy life. Find time to laugh.    Communicate your concerns with your loved ones and your healthcare provider.    Visit with family and friends, and keep up with hobbies.  Limit alcohol and quit smoking    Men should have no more than 2 drinks per day.    Women should have no more than 1 drink per day.    Talk with your healthcare provider about quitting smoking. Smoking significantly increases your risk for heart disease and stroke. Ask your healthcare provider about community smoking cessation programs and other options.  Medicines  If lifestyle changes aren t enough, your healthcare provider may prescribe high blood pressure medicine. Take all medicines as prescribed. If you have any questions about your medicines, ask your healthcare provider before stopping or changing them.   Date Last Reviewed: 4/27/2016 2000-2017 [employer]. [address] [place] [address], [place] [address]. All rights reserved. This information is not intended as a substitute for professional medical care. Always follow your healthcare professional's instructions.

## 2018-05-29 NOTE — PROGRESS NOTES
SUBJECTIVE:   Dontrell Schulz is a 38 year old male who presents to clinic today for the following health issues:      Medication Followup of Blood pressure medication     Taking Medication as prescribed: yes    Side Effects:  None    Medication Helping Symptoms:  Yes- patient feels he could do away with the medication      Patient is here in clinic for follow up of blood pressure and refills of medications. He has been doing well on current medications without side effects. He did have transverse myelitis this fall and was treated with steroids which led to weight gain. He has otherwise had no changes in health or activity levels. He is still a smoker and is thinking about trying to quit but is not ready to do that at this time.     Problem list and histories reviewed & adjusted, as indicated.  Additional history: as documented    BP Readings from Last 3 Encounters:   05/29/18 132/76   04/26/18 130/78   11/06/17 118/58    Wt Readings from Last 3 Encounters:   05/29/18 (!) 334 lb 6.4 oz (151.7 kg)   04/26/18 (!) 337 lb (152.9 kg)   11/06/17 (!) 350 lb 8 oz (159 kg)           Reviewed and updated as needed this visit by clinical staff  Tobacco  Allergies  Meds  Problems  Med Hx  Surg Hx  Fam Hx  Soc Hx        Reviewed and updated as needed this visit by Provider  Tobacco  Allergies  Meds  Problems  Med Hx  Surg Hx  Fam Hx  Soc Hx          ROS:  Constitutional, HEENT, cardiovascular, pulmonary, gi and gu systems are negative, except as otherwise noted.    OBJECTIVE:     /76  Pulse 92  Temp 98.1  F (36.7  C) (Tympanic)  Resp 20  Wt (!) 334 lb 6.4 oz (151.7 kg)  SpO2 97%  BMI 45.35 kg/m2  Body mass index is 45.35 kg/(m^2).  GENERAL: alert, no distress and obese  RESP: lungs clear to auscultation - no rales, rhonchi or wheezes  CV: regular rate and rhythm, normal S1 S2, no S3 or S4, no murmur, click or rub, no peripheral edema and peripheral pulses strong  MS: no gross musculoskeletal  defects noted, no edema  SKIN: no suspicious lesions or rashes    Diagnostic Test Results:  none     ASSESSMENT/PLAN:     Hypertension; controlled   Associated with the following complications:    None   Plan:  No changes in the patient's current treatment plan      Tobacco Cessation:   reports that he has been smoking.  He has been smoking about 1.00 pack per day. He has never used smokeless tobacco.  Tobacco Cessation Action Plan: Information offered: Patient not interested at this time        ICD-10-CM    1. Essential hypertension I10 lisinopril (PRINIVIL/ZESTRIL) 40 MG tablet     amLODIPine (NORVASC) 10 MG tablet   2. Tobacco use Z72.0        I will refill medications and have patient schedule a general physical and establish care visit for sometime this fall. I did discuss quitting smoking and increased exercise and diet changes to help with weight loss. Follow up and discuss further at physical.     See Patient Instructions    Kathy Sarah PA-C  [facility]

## 2018-06-22 ENCOUNTER — OFFICE VISIT (OUTPATIENT)
Dept: SURGERY | Facility: CLINIC | Age: 38
End: 2018-06-22
Payer: COMMERCIAL

## 2018-06-22 VITALS
TEMPERATURE: 97.8 F | BODY MASS INDEX: 46.59 KG/M2 | DIASTOLIC BLOOD PRESSURE: 60 MMHG | WEIGHT: 315 LBS | OXYGEN SATURATION: 100 % | HEART RATE: 80 BPM | SYSTOLIC BLOOD PRESSURE: 138 MMHG

## 2018-06-22 DIAGNOSIS — Z98.890 POST-OPERATIVE STATE: Primary | ICD-10-CM

## 2018-06-22 PROCEDURE — 99024 POSTOP FOLLOW-UP VISIT: CPT | Performed by: SPECIALIST

## 2018-06-22 NOTE — NURSING NOTE
[facility] OP note received, reviewed by Dr. Kingsley and sent to scanning.................................Krista Brooks CMA  ([facility])

## 2018-06-22 NOTE — LETTER
6/22/2018         RE: Dontrell Schulz  [address]  [facility] [address]        Dear Colleague,    Thank you for referring your patient, Dontrell Schulz, to the [facility]. Please see a copy of my visit note below.    F/U for RLE GSV Ablation and b/l phlebs        Subjective:  Pt feels good.  Pre-op sx have resolved.   Area of phlebitis right medial thigh much improved.    Objective:  B/P: 138/60, T: 97.8, P: 80, R: Data Unavailable  RLE - edema much improved.  Phleb sites healed.  Thrombosed superficial vein medial thigh much improved.    Assessment/Plan:  Pt s/p rt GSV ablation with bilateral stab phlebectomies.  Legs much improved.  Will cont knee fadumo compression socks.  Will observe phlebitis for now.  Pt knows to f/u 3-6 months if does not resolve or sooner if there are problems.    Vidal Kingsley MD, FACS    Again, thank you for allowing me to participate in the care of your patient.        Sincerely,        Vidal Kingsley MD

## 2018-06-22 NOTE — MR AVS SNAPSHOT
After Visit Summary   6/22/2018    Dontrell Schulz    MRN: [id number]           Patient Information     Date Of Birth          1980        Visit Information        Provider Department      6/22/2018 8:00 AM Vidal Kingsley MD [facility]        Today's Diagnoses     Post-operative state    -  1      Care Instructions      How to Quit Smoking  Smoking is one of the hardest habits to break. About half of all people who have ever smoked have been able to quit. Most people who still smoke want to quit. Here are some of the best ways to stop smoking.    Keep trying  Most smokers make many attempts at quitting before they are successful. It s important not to give up.  Go cold turkey  Most former smokers quit cold turkey (all at once). Trying to cut back gradually doesn't seem to work as well, perhaps because it continues the smoking habit. Also, it is possible to inhale more while smoking fewer cigarettes. This results in the same amount of nicotine in your body.  Get support  Support programs can be a big help, especially for heavy smokers. These groups offer lectures, ways to change behavior, and peer support. Here are some ways to find a support program:    Free national quitline: 800-QUIT-NOW (253-183-2287).    Hospital quit-smoking programs.    American Lung Association: (939.513.7792).    American Cancer Society (242-400-0200).  Support at home is important too. Nonsmokers can offer praise and encouragement. If the smoker in your life finds it hard to quit, encourage them to keep trying.  Over-the-counter medicines  Nicotine replacement therapy may make quitting easier. Certain aids, such as the nicotine patch, gum, and lozenges, are available without a prescription. It is best to use these under a doctor s care, though. The skin patch provides a steady supply of nicotine. Nicotine gum and lozenges give temporary bursts of low levels of nicotine. Both methods reduce the craving  "for cigarettes. Warning: If you have nausea, vomiting, dizziness, weakness, or a fast heartbeat, stop using these products and see your doctor.  Prescription medicines  After reviewing your smoking patterns and past attempts to quit, your doctor may offer a prescription medicine such as bupropion, varenicline, a nicotine inhaler, or nasal spray. Each has advantages and side effects. Your doctor can review these with you.  Health benefits of quitting  The benefits of quitting start right away and keep improving the longer you go without smoking. These benefits occur at any age.  So whether you are 17 or 70, quitting is a good decision. Some of the benefits include:    20 minutes: Blood pressure and pulse return to normal.    8 hours: Oxygen levels return to normal.    2 days: Ability to smell and taste begin to improve as damaged nerves regrow.    2 to 3 weeks: Circulation and lung function improve.    1 to 9 months: Coughing, congestion, and shortness of breath decrease; tiredness decreases.    1 year: Risk of heart attack decreases by half.    5 years: Risk of lung cancer decreases by half; risk of stroke becomes the same as a nonsmoker s.  For more on how to quit smoking, try these online resources:     Smokefree.gov    \"Clearing the Air\" booklet from the National Cancer [place]: smokefree.gov/sites/default/files/pdf/clearing-the-air-accessible.pdf  Date Last Reviewed: 3/1/2017    0209-5901 [employer]. [address] [place] [address]. All rights reserved. This information is not intended as a substitute for professional medical care. Always follow your healthcare professional's instructions.                Follow-ups after your visit        Who to contact     If you have questions or need follow up information about today's clinic visit or your schedule please contact [facility] directly at [phone].  Normal or non-critical lab and imaging results will be communicated " to you by MyChart, letter or phone within 4 business days after the clinic has received the results. If you do not hear from us within 7 days, please contact the clinic through MyChart or phone. If you have a critical or abnormal lab result, we will notify you by phone as soon as possible.  Submit refill requests through [employer] or call your pharmacy and they will forward the refill request to us. Please allow 3 business days for your refill to be completed.          Additional Information About Your Visit        Care EveryWhere ID     This is your Care EveryWhere ID. This could be used by other organizations to access your [place] medical records  [id number]        Your Vitals Were     Pulse Temperature Pulse Oximetry BMI (Body Mass Index)          80 97.8  F (36.6  C) (Temporal) 100% 46.59 kg/m2         Blood Pressure from Last 3 Encounters:   06/22/18 138/60   05/29/18 132/76   04/26/18 130/78    Weight from Last 3 Encounters:   06/22/18 (!) 155.8 kg (343 lb 8 oz)   05/29/18 (!) 151.7 kg (334 lb 6.4 oz)   04/26/18 (!) 152.9 kg (337 lb)              Today, you had the following     No orders found for display       Primary Care Provider Office Phone # Fax #    [facility] [phone]       [address] [facility] [address]        Equal Access to Services     JUAN BAEZ : Hadii sandoval lopez hadasho Soomaali, waaxda luqadaha, qaybta kaalmada zachariah, amarjit calvert. So [facility] [phone].    ATENCIÓN: Si habla español, tiene a burton disposición servicios gratuitos de asistencia lingüística. Reji al [phone].    We comply with applicable federal civil rights laws and [place] laws. We do not discriminate on the basis of race, color, national origin, age, disability, sex, sexual orientation, or gender identity.            Thank you!     Thank you for choosing [facility]  for your care. Our goal is always to provide you with excellent care.  Hearing back from our patients is one way we can continue to improve our services. Please take a few minutes to complete the written survey that you may receive in the mail after your visit with us. Thank you!             Your Updated Medication List - Protect others around you: Learn how to safely use, store and throw away your medicines at www.disposemymeds.org.          This list is accurate as of 6/22/18  8:41 AM.  Always use your most recent med list.                   Brand Name Dispense Instructions for use Diagnosis    amLODIPine 10 MG tablet    NORVASC    90 tablet    Take 1 tablet (10 mg) by mouth daily    Essential hypertension       gabapentin 300 MG capsule    NEURONTIN    90 capsule    Take 1 tablet (300 mg) every night for 1-3 days, then 1 tablet twice daily for 1-3 days, then 1 tablet three times daily    Transverse myelitis (H)       lisinopril 40 MG tablet    PRINIVIL/ZESTRIL    90 tablet    Take 1 tablet (40 mg) by mouth daily    Essential hypertension       order for DME     1 each    Thigh High HEIDI stockings    Superficial thrombophlebitis of left leg       traMADol 50 MG tablet    ULTRAM    60 tablet    Take 1-2 tablets ( mg) by mouth every 8 hours as needed for pain maximum 6 tablet(s) per day    Superficial thrombophlebitis of left leg

## 2018-06-22 NOTE — NURSING NOTE
Dontrell Schulz is a 38 year old male who presents for:  Chief Complaint   Patient presents with     RECHECK     Varicose veins of both lower extremities with pain         Initial Vitals:  /60 (BP Location: Right arm, Patient Position: Sitting, Cuff Size: Adult Large)  Pulse 80  Temp 97.8  F (36.6  C) (Temporal)  Wt (!) 155.8 kg (343 lb 8 oz)  SpO2 100%  BMI 46.59 kg/m2 Estimated body mass index is 46.59 kg/(m^2) as calculated from the following:    Height as of 10/30/17: 1.829 m (6').    Weight as of this encounter: 155.8 kg (343 lb 8 oz).. Body surface area is 2.81 meters squared. BP completed using cuff size: X-large  Data Unavailable    Do you feel safe in your environment?  Yes  Do you need any refills today? No    Nursing Comments:         Krista Brooks

## 2018-06-22 NOTE — PROGRESS NOTES
F/U for RLE GSV Ablation and b/l phlebs        Subjective:  Pt feels good.  Pre-op sx have resolved.   Area of phlebitis right medial thigh much improved.    Objective:  B/P: 138/60, T: 97.8, P: 80, R: Data Unavailable  RLE - edema much improved.  Phleb sites healed.  Thrombosed superficial vein medial thigh much improved.    Assessment/Plan:  Pt s/p rt GSV ablation with bilateral stab phlebectomies.  Legs much improved.  Will cont knee fadumo compression socks.  Will observe phlebitis for now.  Pt knows to f/u 3-6 months if does not resolve or sooner if there are problems.    Vidal Kingsley MD, FACS

## 2018-06-22 NOTE — PATIENT INSTRUCTIONS

## 2018-12-04 ENCOUNTER — OFFICE VISIT (OUTPATIENT)
Dept: FAMILY MEDICINE | Facility: OTHER | Age: 38
End: 2018-12-04
Payer: COMMERCIAL

## 2018-12-04 VITALS
BODY MASS INDEX: 44.1 KG/M2 | HEIGHT: 71 IN | HEART RATE: 76 BPM | DIASTOLIC BLOOD PRESSURE: 70 MMHG | WEIGHT: 315 LBS | TEMPERATURE: 97.1 F | SYSTOLIC BLOOD PRESSURE: 128 MMHG | RESPIRATION RATE: 16 BRPM

## 2018-12-04 DIAGNOSIS — I10 ESSENTIAL HYPERTENSION: ICD-10-CM

## 2018-12-04 LAB
ANION GAP SERPL CALCULATED.3IONS-SCNC: 7 MMOL/L (ref 3–14)
BUN SERPL-MCNC: 16 MG/DL (ref 7–30)
CALCIUM SERPL-MCNC: 10.1 MG/DL (ref 8.5–10.1)
CHLORIDE SERPL-SCNC: 108 MMOL/L (ref 94–109)
CO2 SERPL-SCNC: 26 MMOL/L (ref 20–32)
CREAT SERPL-MCNC: 0.8 MG/DL (ref 0.66–1.25)
GFR SERPL CREATININE-BSD FRML MDRD: >90 ML/MIN/1.7M2
GLUCOSE SERPL-MCNC: 83 MG/DL (ref 70–99)
POTASSIUM SERPL-SCNC: 4.4 MMOL/L (ref 3.4–5.3)
SODIUM SERPL-SCNC: 141 MMOL/L (ref 133–144)

## 2018-12-04 PROCEDURE — 99213 OFFICE O/P EST LOW 20 MIN: CPT | Performed by: PHYSICIAN ASSISTANT

## 2018-12-04 PROCEDURE — 36415 COLL VENOUS BLD VENIPUNCTURE: CPT | Performed by: PHYSICIAN ASSISTANT

## 2018-12-04 PROCEDURE — 80048 BASIC METABOLIC PNL TOTAL CA: CPT | Performed by: PHYSICIAN ASSISTANT

## 2018-12-04 RX ORDER — AMLODIPINE BESYLATE 10 MG/1
10 TABLET ORAL DAILY
Qty: 90 TABLET | Refills: 3 | Status: SHIPPED | OUTPATIENT
Start: 2018-12-04 | End: 2019-12-02

## 2018-12-04 RX ORDER — LISINOPRIL 40 MG/1
40 TABLET ORAL DAILY
Qty: 90 TABLET | Refills: 3 | Status: SHIPPED | OUTPATIENT
Start: 2018-12-04 | End: 2019-12-02

## 2018-12-04 ASSESSMENT — PAIN SCALES - GENERAL: PAINLEVEL: NO PAIN (0)

## 2018-12-04 NOTE — PROGRESS NOTES
"  SUBJECTIVE:   Dontrell Schulz is a 38 year old male who presents to clinic today for the following health issues:      Hypertension Follow-up      Outpatient blood pressures are not being checked.    Low Salt Diet: not monitoring salt      Amount of exercise or physical activity: 6-7 days/week for an average of greater than 60 minutes    Problems taking medications regularly: No    Medication side effects: none    Diet: regular (no restrictions)        Patient is a 38 year old male who presents for follow up of hypertension. He was last seen 05/2018 and was recommended to discontinue smoking with smoking refills. He ha been taking his medication as directed without adverse effect. BP is 128/70 today which is improved from his last visit. Reports that he has continued to smoke, but is interested in quitting as he has children and wants to improve his health. We discussed chantix, wellbutrin, support groups, counseling and strategies to form new habits. Patient will consider quitting, but did not elect to start a medication today.     Problem list and histories reviewed & adjusted, as indicated.  Additional history: as documented    BP Readings from Last 3 Encounters:   12/04/18 128/70   06/22/18 138/60   05/29/18 132/76    Wt Readings from Last 3 Encounters:   12/04/18 (!) 346 lb 6.4 oz (157.1 kg)   06/22/18 (!) 343 lb 8 oz (155.8 kg)   05/29/18 (!) 334 lb 6.4 oz (151.7 kg)                    Reviewed and updated as needed this visit by clinical staff  Tobacco  Allergies  Meds  Med Hx  Surg Hx  Fam Hx  Soc Hx      Reviewed and updated as needed this visit by Provider         ROS:  Constitutional, HEENT, cardiovascular, pulmonary, gi and gu systems are negative, except as otherwise noted.    OBJECTIVE:     /70 (BP Location: Left arm, Patient Position: Chair, Cuff Size: Adult Large)  Pulse 76  Temp 97.1  F (36.2  C) (Oral)  Resp 16  Ht 5' 10.75\" (1.797 m)  Wt (!) 346 lb 6.4 oz (157.1 kg)  BMI " 48.66 kg/m2  Body mass index is 48.66 kg/(m^2).  GENERAL: healthy, alert and no distress  EYES: Eyes grossly normal to inspection, PERRL and conjunctivae and sclerae normal  HENT: ear canals and TM's normal, nose and mouth without ulcers or lesions  RESP: lungs clear to auscultation - no rales, rhonchi or wheezes  CV: regular rate and rhythm, normal S1 S2, no S3 or S4, no murmur, click or rub, no peripheral edema and peripheral pulses strong  ABDOMEN: soft, nontender, no hepatosplenomegaly, no masses and bowel sounds normal  MS: no gross musculoskeletal defects noted, no edema  NEURO: Normal strength and tone, mentation intact and speech normal  PSYCH: mentation appears normal, affect normal/bright    Diagnostic Test Results:  Results for orders placed or performed in visit on 12/04/18   Basic metabolic panel  (Ca, Cl, CO2, Creat, Gluc, K, Na, BUN)   Result Value Ref Range    Sodium 141 133 - 144 mmol/L    Potassium 4.4 3.4 - 5.3 mmol/L    Chloride 108 94 - 109 mmol/L    Carbon Dioxide 26 20 - 32 mmol/L    Anion Gap 7 3 - 14 mmol/L    Glucose 83 70 - 99 mg/dL    Urea Nitrogen 16 7 - 30 mg/dL    Creatinine 0.80 0.66 - 1.25 mg/dL    GFR Estimate >90 >60 mL/min/1.7m2    GFR Estimate If Black >90 >60 mL/min/1.7m2    Calcium 10.1 8.5 - 10.1 mg/dL       ASSESSMENT/PLAN:       Tobacco Cessation:   reports that he has been smoking.  He has been smoking about 1.00 pack per day. He has never used smokeless tobacco.  Tobacco Cessation Action Plan: Information offered: Patient not interested at this time      1. Essential hypertension  Refills sent to the pharmacy. Patient encouraged to maintain a low sodium diet with regular exercise.   - amLODIPine (NORVASC) 10 MG tablet; Take 1 tablet (10 mg) by mouth daily  Dispense: 90 tablet; Refill: 3  - lisinopril (PRINIVIL/ZESTRIL) 40 MG tablet; Take 1 tablet (40 mg) by mouth daily  Dispense: 90 tablet; Refill: 3  - Basic metabolic panel  (Ca, Cl, CO2, Creat, Gluc, K, Na,  BUN)    Follow up with clinic 6 months or sooner if conditions change, worsen or fail to improve as expected.      Escobar Perry PA-C  [facility]

## 2018-12-04 NOTE — MR AVS SNAPSHOT
"              After Visit Summary   12/4/2018    Dontrell Schulz    MRN: [id number]           Patient Information     Date Of Birth          1980        Visit Information        Provider Department      12/4/2018 2:40 PM Escobar Perry PA-C [facility]        Today's Diagnoses     Essential hypertension           Follow-ups after your visit        Follow-up notes from your care team     Return in about 6 months (around 6/4/2019).      Who to contact     If you have questions or need follow up information about today's clinic visit or your schedule please contact [facility] directly at [phone].  Normal or non-critical lab and imaging results will be communicated to you by MyChart, letter or phone within 4 business days after the clinic has received the results. If you do not hear from us within 7 days, please contact the clinic through MyChart or phone. If you have a critical or abnormal lab result, we will notify you by phone as soon as possible.  Submit refill requests through [employer] or call your pharmacy and they will forward the refill request to us. Please allow 3 business days for your refill to be completed.          Additional Information About Your Visit        Care EveryWhere ID     This is your Care EveryWhere ID. This could be used by other organizations to access your [place] medical records  [id number]        Your Vitals Were     Pulse Temperature Respirations Height BMI (Body Mass Index)       76 97.1  F (36.2  C) (Oral) 16 5' 10.75\" (1.797 m) 48.66 kg/m2        Blood Pressure from Last 3 Encounters:   12/04/18 128/70   06/22/18 138/60   05/29/18 132/76    Weight from Last 3 Encounters:   12/04/18 (!) 346 lb 6.4 oz (157.1 kg)   06/22/18 (!) 343 lb 8 oz (155.8 kg)   05/29/18 (!) 334 lb 6.4 oz (151.7 kg)              We Performed the Following     Basic metabolic panel  (Ca, Cl, CO2, Creat, Gluc, K, Na, BUN)          Where to get your medicines      These " medications were sent to [facility] - [place], [place] - [address]  [address] , [facility] [address]     Phone:  [phone]     amLODIPine 10 MG tablet    lisinopril 40 MG tablet          Primary Care Provider Fax #    Physician No Ref-Primary [phone]       No address on file        Equal Access to Services     JUAN BAEZ : Hadii aad ku hadasho Soomaali, waaxda luqadaha, qaybta kaalmada adeegyada, waxay idiin hayaan adeeg kharash laedgar . So [facility] [phone].    ATENCIÓN: Si habla español, tiene a burton disposición servicios gratuitos de asistencia lingüística. Pavelame al [phone].    We comply with applicable federal civil rights laws and [place] laws. We do not discriminate on the basis of race, color, national origin, age, disability, sex, sexual orientation, or gender identity.            Thank you!     Thank you for choosing [facility]  for your care. Our goal is always to provide you with excellent care. Hearing back from our patients is one way we can continue to improve our services. Please take a few minutes to complete the written survey that you may receive in the mail after your visit with us. Thank you!             Your Updated Medication List - Protect others around you: Learn how to safely use, store and throw away your medicines at www.disposemymeds.org.          This list is accurate as of 12/4/18 11:59 PM.  Always use your most recent med list.                   Brand Name Dispense Instructions for use Diagnosis    amLODIPine 10 MG tablet    NORVASC    90 tablet    Take 1 tablet (10 mg) by mouth daily    Essential hypertension       lisinopril 40 MG tablet    PRINIVIL/ZESTRIL    90 tablet    Take 1 tablet (40 mg) by mouth daily    Essential hypertension       order for DME     1 each    Thigh High HEIDI stockings    Superficial thrombophlebitis of left leg       traMADol 50 MG tablet    ULTRAM    60 tablet    Take 1-2 tablets ( mg) by mouth every 8 hours as  needed for pain maximum 6 tablet(s) per day    Superficial thrombophlebitis of left leg

## 2018-12-04 NOTE — NURSING NOTE
Health Maintenance Due   Topic Date Due     TETANUS IMMUNIZATION (SYSTEM ASSIGNED)  03/01/2000     HIV SCREEN (SYSTEM ASSIGNED)  03/01/2000     LIPID SCREEN Q5 YR MALE (SYSTEM ASSIGNED)  03/01/2017     INFLUENZA VACCINE (1) 09/01/2018     PHQ-2 Q1 YR  11/06/2018     Myra WILSON LPN

## 2018-12-05 ENCOUNTER — TELEPHONE (OUTPATIENT)
Dept: FAMILY MEDICINE | Facility: OTHER | Age: 38
End: 2018-12-05

## 2018-12-05 NOTE — TELEPHONE ENCOUNTER
Called and LM for patient to call back in regards to results below. Please relay message to patient. Also sent letter to patient since labs are negative.   Sheree Yo MA k

## 2018-12-05 NOTE — TELEPHONE ENCOUNTER
----- Message from Escobar Perry PA-C sent at 12/5/2018  9:48 AM CST -----  Please call with results. Please ensure that potassium and kidney function is both within normal range. Have a great rest of your week.    Escobar Perry PA-C

## 2018-12-05 NOTE — LETTER
December 5, 2018      Dontrell Schulz  [address]  [place] [address]        Dear ,    We are writing to inform you of your test results.    Your test results fall within the expected range(s) or remain unchanged from previous results.  Please continue with current treatment plan.    Resulted Orders   Basic metabolic panel  (Ca, Cl, CO2, Creat, Gluc, K, Na, BUN)   Result Value Ref Range    Sodium 141 133 - 144 mmol/L    Potassium 4.4 3.4 - 5.3 mmol/L    Chloride 108 94 - 109 mmol/L    Carbon Dioxide 26 20 - 32 mmol/L    Anion Gap 7 3 - 14 mmol/L    Glucose 83 70 - 99 mg/dL    Urea Nitrogen 16 7 - 30 mg/dL    Creatinine 0.80 0.66 - 1.25 mg/dL    GFR Estimate >90 >60 mL/min/1.7m2      Comment:      Non  GFR Calc    GFR Estimate If Black >90 >60 mL/min/1.7m2      Comment:       GFR Calc    Calcium 10.1 8.5 - 10.1 mg/dL       If you have any questions or concerns, please call the clinic at the number listed above.       Sincerely,        Escobar Perry PA-C

## 2019-04-17 ENCOUNTER — MYC MEDICAL ADVICE (OUTPATIENT)
Dept: FAMILY MEDICINE | Facility: OTHER | Age: 39
End: 2019-04-17

## 2019-04-17 DIAGNOSIS — Z71.6 ENCOUNTER FOR SMOKING CESSATION COUNSELING: Primary | ICD-10-CM

## 2019-07-09 ENCOUNTER — MYC MEDICAL ADVICE (OUTPATIENT)
Dept: FAMILY MEDICINE | Facility: OTHER | Age: 39
End: 2019-07-09

## 2019-07-09 ENCOUNTER — TELEPHONE (OUTPATIENT)
Dept: FAMILY MEDICINE | Facility: OTHER | Age: 39
End: 2019-07-09

## 2019-07-09 ENCOUNTER — NURSE TRIAGE (OUTPATIENT)
Dept: NURSING | Facility: CLINIC | Age: 39
End: 2019-07-09

## 2019-07-09 NOTE — TELEPHONE ENCOUNTER
Sent mychart message that there is no available time for blood pressure check today but we are able to do an appointment tomorrow with float nurse.     Sheree Yo MA

## 2019-07-09 NOTE — TELEPHONE ENCOUNTER
Unfortunately I do not have any openings today, patient could schedule for Thursday or Friday as I believe that there are openings on those days. He could also check with the other provider's schedules to see if there are openings. I recommend that he make sure he is drinking adequate water, monitoring for when symptoms occur and write down surrounding details (e.g recent food/drink, exertion/activity, length of symptoms, things make it better/worse, stress level, etc).     Escobar Perry PA-C on 7/9/2019 at 1:56 PM

## 2019-07-09 NOTE — TELEPHONE ENCOUNTER
Reason for Call:  Same Day Appointment, Requested Provider:  Escobar Perry PA-C    PCP: No Ref-Primary, Physician    Reason for visit: Patient has been experiencing a lot of dizziness and being overly tired for the last few days. Did speak with triage and was told to call and get worked in as soon as possible. There are no appointments available. Would like to see Escobar Perry. Is there any chance he can get worked in today? Please see triage note. I was not able to addend it to add this to the original message.     Duration of symptoms: 4 days    Have you been treated for this in the past? No    Additional comments:     Can we leave a detailed message on this number? YES    Phone number patient can be reached at: Home number on file [phone] (home)    Best Time: any    Call taken on 7/9/2019 at 9:33 AM by Paola Horvath

## 2019-07-09 NOTE — TELEPHONE ENCOUNTER
"Dontrell is having dizziness for the past three days and is more tired than normal.  Denies a fever.  Dontrell had to come home from work today.  Dontrell is hydrated.  Dontrell is also in the process of stopping smoking and in on Chantix.        Reason for Disposition    [1] MODERATE dizziness (e.g., interferes with normal activities) AND [2] has NOT been evaluated by physician for this  (Exception: dizziness caused by heat exposure, sudden standing, or poor fluid intake)    Additional Information    Negative: Severe difficulty breathing (e.g., struggling for each breath, speaks in single words)    Negative: [1] Difficulty breathing or swallowing AND [2] started suddenly after medicine, an allergic food or bee sting    Negative: Shock suspected (e.g., cold/pale/clammy skin, too weak to stand, low BP, rapid pulse)    Negative: Difficult to awaken or acting confused (e.g., disoriented, slurred speech)    Negative: [1] Weakness (i.e., paralysis, loss of muscle strength) of the face, arm or leg on one side of the body AND [2] sudden onset AND [3] present now    Negative: [1] Numbness (i.e., loss of sensation) of the face, arm or leg on one side of the body AND [2] sudden onset AND [3] present now    Negative: [1] Loss of speech or garbled speech AND [2] sudden onset AND [3] present now    Negative: Overdose (accidental or intentional) of medications    Negative: [1] Fainted > 15 minutes ago AND [2] still feels too weak or dizzy to stand    Negative: Heart beating < 50 beats per minute OR > 140 beats per minute    Negative: Sounds like a life-threatening emergency to the triager    Negative: Difficulty breathing    Negative: SEVERE dizziness (e.g., unable to stand, requires support to walk, feels like passing out now)    Negative: Extra heart beats OR irregular heart beating  (i.e., \"palpitations\")    Negative: [1] Drinking very little AND [2] dehydration suspected (e.g., no urine > 12 hours, very dry mouth, very " lightheaded)    Negative: Patient sounds very sick or weak to the triager    Negative: [1] Dizziness caused by heat exposure, sudden standing, or poor fluid intake AND [2] no improvement after 2 hours of rest and fluids    Negative: [1] Fever > 103 F (39.4 C) AND [2] not able to get the fever down using Fever Care Advice    Negative: [1] Fever > 101 F (38.3 C) AND [2] age > 60    Negative: [1] Fever > 100.0 F (37.8 C) AND [2] bedridden (e.g., nursing home patient, CVA, chronic illness, recovering from surgery)    Negative: [1] Fever > 100.0 F (37.8 C) AND [2] diabetes mellitus or weak immune system (e.g., HIV positive, cancer chemo, splenectomy, chronic steroids)    Protocols used: DIZZINESS - [id number]

## 2019-07-10 ENCOUNTER — TELEPHONE (OUTPATIENT)
Dept: FAMILY MEDICINE | Facility: OTHER | Age: 39
End: 2019-07-10

## 2019-07-10 ENCOUNTER — APPOINTMENT (OUTPATIENT)
Dept: MRI IMAGING | Facility: CLINIC | Age: 39
End: 2019-07-10
Attending: EMERGENCY MEDICINE
Payer: COMMERCIAL

## 2019-07-10 ENCOUNTER — HOSPITAL ENCOUNTER (EMERGENCY)
Facility: CLINIC | Age: 39
Discharge: HOME | End: 2019-07-10
Attending: EMERGENCY MEDICINE | Admitting: EMERGENCY MEDICINE
Payer: COMMERCIAL

## 2019-07-10 VITALS
DIASTOLIC BLOOD PRESSURE: 62 MMHG | HEART RATE: 57 BPM | BODY MASS INDEX: 49.72 KG/M2 | OXYGEN SATURATION: 99 % | SYSTOLIC BLOOD PRESSURE: 128 MMHG | TEMPERATURE: 98 F | WEIGHT: 315 LBS | RESPIRATION RATE: 16 BRPM

## 2019-07-10 DIAGNOSIS — H81.10 BENIGN PAROXYSMAL POSITIONAL VERTIGO, UNSPECIFIED LATERALITY: ICD-10-CM

## 2019-07-10 LAB
ALBUMIN SERPL-MCNC: 3.5 G/DL (ref 3.4–5)
ALBUMIN UR-MCNC: NEGATIVE MG/DL
ALP SERPL-CCNC: 78 U/L (ref 40–150)
ALT SERPL W P-5'-P-CCNC: 51 U/L (ref 0–70)
ANION GAP SERPL CALCULATED.3IONS-SCNC: 10 MMOL/L (ref 3–14)
APPEARANCE UR: CLEAR
AST SERPL W P-5'-P-CCNC: 26 U/L (ref 0–45)
BASOPHILS # BLD AUTO: 0 10E9/L (ref 0–0.2)
BASOPHILS NFR BLD AUTO: 0.5 %
BILIRUB SERPL-MCNC: 0.5 MG/DL (ref 0.2–1.3)
BILIRUB UR QL STRIP: NEGATIVE
BUN SERPL-MCNC: 14 MG/DL (ref 7–30)
CALCIUM SERPL-MCNC: 10.2 MG/DL (ref 8.5–10.1)
CHLORIDE SERPL-SCNC: 107 MMOL/L (ref 94–109)
CK SERPL-CCNC: 78 U/L (ref 30–300)
CO2 SERPL-SCNC: 26 MMOL/L (ref 20–32)
COLOR UR AUTO: YELLOW
CREAT SERPL-MCNC: 0.67 MG/DL (ref 0.66–1.25)
DIFFERENTIAL METHOD BLD: NORMAL
EOSINOPHIL NFR BLD AUTO: 3.4 %
ERYTHROCYTE [DISTWIDTH] IN BLOOD BY AUTOMATED COUNT: 12.2 % (ref 10–15)
GFR SERPL CREATININE-BSD FRML MDRD: >90 ML/MIN/{1.73_M2}
GLUCOSE SERPL-MCNC: 87 MG/DL (ref 70–99)
GLUCOSE UR STRIP-MCNC: NEGATIVE MG/DL
HCT VFR BLD AUTO: 45.7 % (ref 40–53)
HGB BLD-MCNC: 15.4 G/DL (ref 13.3–17.7)
HGB UR QL STRIP: NEGATIVE
IMM GRANULOCYTES # BLD: 0 10E9/L (ref 0–0.4)
IMM GRANULOCYTES NFR BLD: 0.2 %
INR PPP: 1.06 (ref 0.86–1.14)
KETONES UR STRIP-MCNC: NEGATIVE MG/DL
LACTATE BLD-SCNC: 0.7 MMOL/L (ref 0.7–2)
LEUKOCYTE ESTERASE UR QL STRIP: NEGATIVE
LYMPHOCYTES # BLD AUTO: 1.7 10E9/L (ref 0.8–5.3)
LYMPHOCYTES NFR BLD AUTO: 29 %
MCH RBC QN AUTO: 30.4 PG (ref 26.5–33)
MCHC RBC AUTO-ENTMCNC: 33.7 G/DL (ref 31.5–36.5)
MCV RBC AUTO: 90 FL (ref 78–100)
MONOCYTES # BLD AUTO: 0.5 10E9/L (ref 0–1.3)
MONOCYTES NFR BLD AUTO: 8.6 %
NEUTROPHILS # BLD AUTO: 3.5 10E9/L (ref 1.6–8.3)
NEUTROPHILS NFR BLD AUTO: 58.3 %
NITRATE UR QL: NEGATIVE
NRBC # BLD AUTO: 0 10*3/UL
NRBC BLD AUTO-RTO: 0 /100
PH UR STRIP: 6 PH (ref 5–7)
PLATELET # BLD AUTO: 214 10E9/L (ref 150–450)
POTASSIUM SERPL-SCNC: 4.3 MMOL/L (ref 3.4–5.3)
PROT SERPL-MCNC: 6.8 G/DL (ref 6.8–8.8)
RBC # BLD AUTO: 5.07 10E12/L (ref 4.4–5.9)
SODIUM SERPL-SCNC: 143 MMOL/L (ref 133–144)
SOURCE: NORMAL
SP GR UR STRIP: 1.01 (ref 1–1.03)
UROBILINOGEN UR STRIP-MCNC: 0 MG/DL (ref 0–2)
WBC # BLD AUTO: 5.9 10E9/L (ref 4–11)

## 2019-07-10 PROCEDURE — 70553 MRI BRAIN STEM W/O & W/DYE: CPT

## 2019-07-10 PROCEDURE — 83605 ASSAY OF LACTIC ACID: CPT | Performed by: EMERGENCY MEDICINE

## 2019-07-10 PROCEDURE — 99284 EMERGENCY DEPT VISIT MOD MDM: CPT | Mod: 25 | Performed by: EMERGENCY MEDICINE

## 2019-07-10 PROCEDURE — 70549 MR ANGIOGRAPH NECK W/O&W/DYE: CPT

## 2019-07-10 PROCEDURE — 85610 PROTHROMBIN TIME: CPT | Performed by: EMERGENCY MEDICINE

## 2019-07-10 PROCEDURE — 82550 ASSAY OF CK (CPK): CPT | Performed by: EMERGENCY MEDICINE

## 2019-07-10 PROCEDURE — A9585 GADOBUTROL INJECTION: HCPCS | Performed by: EMERGENCY MEDICINE

## 2019-07-10 PROCEDURE — 81003 URINALYSIS AUTO W/O SCOPE: CPT | Performed by: EMERGENCY MEDICINE

## 2019-07-10 PROCEDURE — 85025 COMPLETE CBC W/AUTO DIFF WBC: CPT | Performed by: EMERGENCY MEDICINE

## 2019-07-10 PROCEDURE — 93010 ELECTROCARDIOGRAM REPORT: CPT | Mod: Z6 | Performed by: EMERGENCY MEDICINE

## 2019-07-10 PROCEDURE — 96361 HYDRATE IV INFUSION ADD-ON: CPT | Mod: 59 | Performed by: EMERGENCY MEDICINE

## 2019-07-10 PROCEDURE — 25000132 ZZH RX MED GY IP 250 OP 250 PS 637: Performed by: EMERGENCY MEDICINE

## 2019-07-10 PROCEDURE — 80053 COMPREHEN METABOLIC PANEL: CPT | Performed by: EMERGENCY MEDICINE

## 2019-07-10 PROCEDURE — 96374 THER/PROPH/DIAG INJ IV PUSH: CPT | Performed by: EMERGENCY MEDICINE

## 2019-07-10 PROCEDURE — 99285 EMERGENCY DEPT VISIT HI MDM: CPT | Mod: 25 | Performed by: EMERGENCY MEDICINE

## 2019-07-10 PROCEDURE — 25500064 ZZH RX 255 OP 636: Performed by: EMERGENCY MEDICINE

## 2019-07-10 PROCEDURE — 93005 ELECTROCARDIOGRAM TRACING: CPT | Performed by: EMERGENCY MEDICINE

## 2019-07-10 PROCEDURE — 70544 MR ANGIOGRAPHY HEAD W/O DYE: CPT

## 2019-07-10 PROCEDURE — 25800030 ZZH RX IP 258 OP 636: Performed by: EMERGENCY MEDICINE

## 2019-07-10 PROCEDURE — 25000128 H RX IP 250 OP 636: Performed by: EMERGENCY MEDICINE

## 2019-07-10 RX ORDER — GADOBUTROL 604.72 MG/ML
7.5 INJECTION INTRAVENOUS ONCE
Status: COMPLETED | OUTPATIENT
Start: 2019-07-10 | End: 2019-07-10

## 2019-07-10 RX ORDER — SODIUM CHLORIDE 9 MG/ML
1000 INJECTION, SOLUTION INTRAVENOUS CONTINUOUS
Status: DISCONTINUED | OUTPATIENT
Start: 2019-07-10 | End: 2019-07-10 | Stop reason: HOSPADM

## 2019-07-10 RX ORDER — LORAZEPAM 2 MG/ML
1 INJECTION INTRAMUSCULAR ONCE
Status: COMPLETED | OUTPATIENT
Start: 2019-07-10 | End: 2019-07-10

## 2019-07-10 RX ORDER — MECLIZINE HYDROCHLORIDE 25 MG/1
25 TABLET ORAL 3 TIMES DAILY PRN
Qty: 30 TABLET | Refills: 0 | Status: SHIPPED | OUTPATIENT
Start: 2019-07-10 | End: 2023-06-02

## 2019-07-10 RX ORDER — MECLIZINE HYDROCHLORIDE 25 MG/1
25 TABLET ORAL ONCE
Status: COMPLETED | OUTPATIENT
Start: 2019-07-10 | End: 2019-07-10

## 2019-07-10 RX ORDER — DIAZEPAM 5 MG
5 TABLET ORAL EVERY 6 HOURS PRN
Qty: 20 TABLET | Refills: 0 | Status: SHIPPED | OUTPATIENT
Start: 2019-07-10 | End: 2021-02-26

## 2019-07-10 RX ADMIN — MECLIZINE HYDROCHLORIDE 25 MG: 25 TABLET ORAL at 11:22

## 2019-07-10 RX ADMIN — SODIUM CHLORIDE 1000 ML: 9 INJECTION, SOLUTION INTRAVENOUS at 11:23

## 2019-07-10 RX ADMIN — GADOBUTROL 15 ML: 604.72 INJECTION INTRAVENOUS at 15:38

## 2019-07-10 RX ADMIN — SODIUM CHLORIDE 1000 ML: 9 INJECTION, SOLUTION INTRAVENOUS at 12:17

## 2019-07-10 RX ADMIN — GADOBUTROL 7.5 ML: 604.72 INJECTION INTRAVENOUS at 15:40

## 2019-07-10 RX ADMIN — LORAZEPAM 1 MG: 2 INJECTION INTRAMUSCULAR; INTRAVENOUS at 14:23

## 2019-07-10 NOTE — ED PROVIDER NOTES
History     Chief Complaint   Patient presents with     Dizziness     HPI  Dontrell Schulz is a 39 year old male who presents with intermittent dizziness since Saturday.  Patient states that with positional changes the room feels like it spinning.  Denies history of positional vertigo.  He has a mild headache.  No change in his hearing.  No change in his vision.  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  No difficulty with speech or swallowing.  Patient does have history of hypertension and did take his blood pressure medicine today.  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  He states that his urine sometimes is darker but other times later depending on how much he drinks.  He has had no history of heat exhaustion.  Currently denies any chest pain or shortness of breath.  He has had no upper respiratory symptoms.  Denies seasonal allergies.  Denies abdominal pain, nausea or vomiting.  He said no diarrhea.  Denies leg pain or swelling.  No history of DVT or PE.  No history of TIA or stroke.  No treatment for the dizziness prior to arrival.  2 years ago he had transverse myelitis with lower extremity weakness  Patient is currently on Chantix for tobacco cessation.  Questions if this is causing some his symptoms but he has been on this for over a month.  No exposure to infectious illness.  No recent fall or head injury.  Still smoking but cutting back.    Allergies:  Allergies   Allergen Reactions     Nkda [No Known Drug Allergies]        Problem List:    Patient Active Problem List    Diagnosis Date Noted     Morbid obesity -- BMI 45.5 10/31/2017     Priority: Medium     Essential hypertension 10/31/2017     Priority: Medium     Transverse myelitis (H) 10/31/2017     Priority: Medium     Lower extremity numbness 10/30/2017     Priority: Medium        Past Medical History:    Past Medical History:   Diagnosis Date     Hypertension         Past Surgical History:    Past Surgical History:   Procedure Laterality Date     EYE SURGERY  2000    trauma related - Retinal injury       Family History:    No family history on file.    Social History:  Marital Status:   [2]  Social History     Tobacco Use     Smoking status: Current Every Day Smoker     Packs/day: 1.00     Smokeless tobacco: Never Used   Substance Use Topics     Alcohol use: Yes     Comment: occas     Drug use: No        Medications:      diazepam (VALIUM) 5 MG tablet   meclizine (ANTIVERT) 25 MG tablet   amLODIPine (NORVASC) 10 MG tablet   lisinopril (PRINIVIL/ZESTRIL) 40 MG tablet   order for DME   traMADol (ULTRAM) 50 MG tablet   varenicline (CHANTIX TAYLER) 0.5 MG X 11 & 1 MG X 42 tablet   varenicline (CHANTIX) 1 MG tablet         Review of Systems all other systems were reviewed and are negative.    Physical Exam   BP: 114/46  Pulse: 64  Temp: 98  F (36.7  C)  Resp: 16  Weight: (!) 160.6 kg (354 lb)  SpO2: 96 %      Physical Exam alert cooperative male in mild to moderate distress.  When he lays down or turns his head quickly he has increased symptomology.  Ears reveal cerumen impaction on the right.  Left appears normal.  His pupils on the right eye are unreactive and irregular shaped.  This is the previous injured eye.  He does not have conjugate gaze again due to the injured right eye.  The left eye has equal and reactive pupil.  Did not reproduce symptoms with lateral gaze.  No facial droop or asymmetry.  Nasal packs are patent.  Orally no lesion.  Speech is clear.  Neck is supple without bruits or stridor.  Lungs are clear without adventitious sounds.  Cardiac auscultation is normal.  Neurologically nonfocal.  Ambulation and Romberg was not initially checked due to his dizziness.    ED Course        Procedures               EKG Interpretation:      Interpreted by Jin Rodriguez  Time reviewed: 11:25  Symptoms at time of EKG: Dizziness  Rhythm: normal sinus   Rate:  Bradycardia  Axis: Normal  Ectopy: none  Conduction: normal  ST Segments/ T Waves: No acute ischemic changes  Q Waves: none  Comparison to prior: No old EKG available    Clinical Impression: sinus bradycardia                Critical Care time:  none               Results for orders placed or performed during the hospital encounter of 07/10/19 (from the past 24 hour(s))   CBC with platelets differential   Result Value Ref Range    WBC 5.9 4.0 - 11.0 10e9/L    RBC Count 5.07 4.4 - 5.9 10e12/L    Hemoglobin 15.4 13.3 - 17.7 g/dL    Hematocrit 45.7 40.0 - 53.0 %    MCV 90 78 - 100 fl    MCH 30.4 26.5 - 33.0 pg    MCHC 33.7 31.5 - 36.5 g/dL    RDW 12.2 10.0 - 15.0 %    Platelet Count 214 150 - 450 10e9/L    Diff Method Automated Method     % Neutrophils 58.3 %    % Lymphocytes 29.0 %    % Monocytes 8.6 %    % Eosinophils 3.4 %    % Basophils 0.5 %    % Immature Granulocytes 0.2 %    Nucleated RBCs 0 0 /100    Absolute Neutrophil 3.5 1.6 - 8.3 10e9/L    Absolute Lymphocytes 1.7 0.8 - 5.3 10e9/L    Absolute Monocytes 0.5 0.0 - 1.3 10e9/L    Absolute Basophils 0.0 0.0 - 0.2 10e9/L    Abs Immature Granulocytes 0.0 0 - 0.4 10e9/L    Absolute Nucleated RBC 0.0    INR   Result Value Ref Range    INR 1.06 0.86 - 1.14   Comprehensive metabolic panel   Result Value Ref Range    Sodium 143 133 - 144 mmol/L    Potassium 4.3 3.4 - 5.3 mmol/L    Chloride 107 94 - 109 mmol/L    Carbon Dioxide 26 20 - 32 mmol/L    Anion Gap 10 3 - 14 mmol/L    Glucose 87 70 - 99 mg/dL    Urea Nitrogen 14 7 - 30 mg/dL    Creatinine 0.67 0.66 - 1.25 mg/dL    GFR Estimate >90 >60 mL/min/[1.73_m2]    GFR Estimate If Black >90 >60 mL/min/[1.73_m2]    Calcium 10.2 (H) 8.5 - 10.1 mg/dL    Bilirubin Total 0.5 0.2 - 1.3 mg/dL    Albumin 3.5 3.4 - 5.0 g/dL    Protein Total 6.8 6.8 - 8.8 g/dL    Alkaline Phosphatase 78 40 - 150 U/L    ALT 51 0 - 70 U/L    AST 26 0 - 45 U/L   Lactic acid whole blood   Result Value Ref Range    Lactic Acid 0.7 0.7 - 2.0 mmol/L   CK  total   Result Value Ref Range    CK Total 78 30 - 300 U/L   UA reflex to Microscopic   Result Value Ref Range    Color Urine Yellow     Appearance Urine Clear     Glucose Urine Negative NEG^Negative mg/dL    Bilirubin Urine Negative NEG^Negative    Ketones Urine Negative NEG^Negative mg/dL    Specific Gravity Urine 1.015 1.003 - 1.035    Blood Urine Negative NEG^Negative    pH Urine 6.0 5.0 - 7.0 pH    Protein Albumin Urine Negative NEG^Negative mg/dL    Urobilinogen mg/dL 0.0 0.0 - 2.0 mg/dL    Nitrite Urine Negative NEG^Negative    Leukocyte Esterase Urine Negative NEG^Negative    Source Midstream Urine    MR Head w/o Contrast Angiogram    Narrative    MR ANGIOGRAM OF THE HEAD WITHOUT CONTRAST   7/10/2019 3:18 PM     HISTORY: Headache and dizziness.    TECHNIQUE:  3D time-of-flight MR angiogram of the head without  contrast.    COMPARISON: None.    FINDINGS: The major intracranial arteries including the proximal  branches of the anterior cerebral, middle cerebral, and posterior  cerebral arteries appear patent without vascular cutoff. No aneurysm  identified. No significant stenosis.     The basilar artery appears partially fenestrated, likely an anatomic  variant.       Impression    IMPRESSION:    1. No vascular cutoff of the proximal major intracranial arteries. No  significant intracranial stenosis or aneurysm.  2. 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        SONIA BURCH MD   MR Neck w/o & w Contrast Angiogram    Narrative    MRA NECK WITHOUT AND WITH CONTRAST  7/10/2019 3:55 PM     HISTORY: Headache and dizziness.     TECHNIQUE: 2D time-of-flight MR angiogram of the neck without contrast  and 3D MR angiogram of the neck with  15 mL Gadavist. Estimates of  carotid stenoses are made relative to the distal internal carotid  artery diameters except as noted.     COMPARISON: None.     FINDINGS:    Normal origin of the great vessels from the aortic arch.     Right carotid artery: The  right common and internal carotid arteries  are patent. No significant stenosis.     Left carotid artery: The left common and internal carotid arteries are  patent. No significant stenosis.     Vertebral arteries: Vertebral arteries appear patent without evidence  of dissection. No significant stenosis.       Impression    IMPRESSION:  Normal MR angiogram of the neck.     SONIA BURCH MD   MR Brain w/o & w Contrast    Narrative    MRI BRAIN WITHOUT AND WITH CONTRAST  7/10/2019 3:56 PM     HISTORY:  Headache and dizziness.     TECHNIQUE:  Multiplanar, multisequence MRI of the brain without and  with 15 mL Gadavist.     COMPARISON: Brain MR 10/14/2017.     FINDINGS: There is no evidence of acute infarct, hemorrhage, mass, or  herniation. The brain parenchyma, ventricles and subarachnoid spaces  appear normal.     There is no abnormal intracranial enhancement.     The facial structures appear normal.       Impression    IMPRESSION:  Normal MRI of the brain.     SONIA BURCH MD       Medications   0.9% sodium chloride BOLUS (0 mLs Intravenous Stopped 7/10/19 1217)     Followed by   sodium chloride 0.9% infusion (0 mLs Intravenous Stopped 7/10/19 1305)   meclizine (ANTIVERT) tablet 25 mg (25 mg Oral Given 7/10/19 1122)   LORazepam (ATIVAN) injection 1 mg (1 mg Intravenous Given 7/10/19 1423)   gadobutrol (GADAVIST) injection 7.5 mL (7.5 mLs Intravenous Given 7/10/19 1540)   gadobutrol (GADAVIST) injection 7.5 mL (15 mLs Intravenous Given 7/10/19 1538)   sodium chloride (PF) 0.9% PF flush 50 mL (50 mLs Intracatheter Given 7/10/19 1539)     EKG was obtained and showed a sinus bradycardia noted above.  Review of records show no EKG for comparison.  Review of clinic visits show his pulse is been in the mid 70s to mid 80 range.  Without treatment his pulse did increase to 64.  He was not hypotensive with the bradycardia.  Patient was ambulated and still felt quite dizzy.  Abdomen is ordered and an MRI /MRA of the head and  neck ordered.  Initially this showed no acute abnormality noted above.  Patient thinks that the meclizine worked better than the Ativan.  Assessments & Plan (with Medical Decision Making)   Dontrell Schulz is a 39 year old male who presents with intermittent dizziness since Saturday.  Patient states that with positional changes the room feels like it spinning.  Denies history of positional vertigo.  He has a mild headache.  No change in his hearing.  No change in his vision.  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  No difficulty with speech or swallowing.  Patient does have history of hypertension and did take his blood pressure medicine today.  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  He states that his urine sometimes is darker but other times later depending on how much he drinks.  He has had no history of heat exhaustion.  Currently denies any chest pain or shortness of breath.  He has had no upper respiratory symptoms.  Denies seasonal allergies.  Denies abdominal pain, nausea or vomiting.  He said no diarrhea.  Denies leg pain or swelling.  No history of DVT or PE.  No history of TIA or stroke.  No treatment for the dizziness prior to arrival.  2 years ago he had transverse myelitis with lower extremity weakness  Patient is currently on Chantix for tobacco cessation.  Questions if this is causing some his symptoms but he has been on this for over a month.  No exposure to infectious illness.  No recent fall or head injury.  Still smoking but cutting back.  Presentation patient was bradycardic but normalized without treatment.  EKG showed sinus bradycardia without acute changes.  Blood work was unremarkable including CPK.  Urine was unremarkable showing no evidence of dehydration.  His exam was unremarkable but difficult to assess his extraocular motions due to his previous eye injury.  He was given meclizine with some  improvement but not resolution.  An MRI/MRA of the head neck was ordered to further assess for dissection or posterior stroke.  This is felt to be normal as described above.  Patient was given Ativan prior to his scan and thought that the meclizine helped better.  We discussed positional vertigo and the half somersault technique to try at home.  If persistence of symptoms follow-up with physical therapy for Epley's maneuvers.  If worsening or new concerning symptoms return to the emergency room.  I have reviewed the nursing notes.    I have reviewed the findings, diagnosis, plan and need for follow up with the patient.          Medication List      Started    diazepam 5 MG tablet  Commonly known as:  VALIUM  5 mg, Oral, EVERY 6 HOURS PRN     meclizine 25 MG tablet  Commonly known as:  ANTIVERT  25 mg, Oral, 3 TIMES DAILY PRN            Final diagnoses:   Benign paroxysmal positional vertigo, unspecified laterality       7/10/2019   [facility] EMERGENCY DEPARTMENT     Jin Rodriguez MD  07/10/19 0442

## 2019-07-10 NOTE — ED AVS SNAPSHOT
[facility] Emergency Department  [address] [place] [address]  Phone:  [phone]  Fax:  [phone]                                    Dontrell Schulz   MRN: [id number]    Department:  [facility] Emergency Department   Date of Visit:  7/10/2019           After Visit Summary Signature Page    I have received my discharge instructions, and my questions have been answered. I have discussed any challenges I see with this plan with the nurse or doctor.    ..........................................................................................................................................  Patient/Patient Representative Signature      ..........................................................................................................................................  Patient Representative Print Name and Relationship to Patient    ..................................................               ................................................  Date                                   Time    ..........................................................................................................................................  Reviewed by Signature/Title    ...................................................              ..............................................  Date                                               Time          22EPIC Rev 08/18

## 2019-07-10 NOTE — ED TRIAGE NOTES
Pt has felt like being on a boat dizzy, since Saturday. Very tired, dull headaches.Is on Chantix but has been since May. Is on BP pills but no recent changes. No extremity weakness.

## 2019-07-10 NOTE — DISCHARGE INSTRUCTIONS
Try meclizine or Valium for recurrent dizziness.  You can try the half somersault technique at home but at this time I am unsure which ear is affected.  If symptoms persist follow-up with physical therapy for formal Epley's maneuvers.

## 2019-07-10 NOTE — TELEPHONE ENCOUNTER
Spoke to patient's wife. She said the patient has been having issues with dizziness since Saturday. Patient had to come home from work early yesterday due to the dizziness. [facility] checked and it was 125/66. Today patient's dizziness is much worse. He is having a hard time even standing. Room is spinning when he lays down. Has been on Chantix the last couple months. Did not take it today as he is worried that might be the cause. Headache. Very tired. Wife said he slept all day yesterday and so far all morning. Very unusual for the patient the sleep that much. Some diarrhea over the weekend. Falls forward when he sits up in bed and tries to get up. Denies urgent symptoms.     RN advised patient should be seen. Discussed options of clinic vs ED. Wife and patient decided they would prefer him to be seen in the ED since things are a lot worse today. Wife will get him over to the ED now.     RECOMMENDED DISPOSITION:  To ED, another person to drive -   Will comply with recommendation: YES   If further questions/concerns or if Sx do not improve, worsen or new Sx develop, call your PCP or [facility] as soon as possible.    NOTES:  Disposition was determined by the first positive assessment question, therefore all previous assessment questions were negative.  Informed to check provider manual or call insurance company to assure coverage.    Guideline used: Dizziness  Telephone Triage Protocols for Nurses, Fifth Edition, Magi Arauz RN

## 2019-07-12 ENCOUNTER — MYC MEDICAL ADVICE (OUTPATIENT)
Dept: FAMILY MEDICINE | Facility: OTHER | Age: 39
End: 2019-07-12

## 2019-07-12 NOTE — TELEPHONE ENCOUNTER
Please call patient to inform him he can  his note at the front.     Escobar Perry PA-C on 7/12/2019 at 1:03 PM

## 2019-07-12 NOTE — TELEPHONE ENCOUNTER
Patient has been notified Via my chart that its at  for .     Letter placed up front for .     Sheree Yo MA

## 2019-07-12 NOTE — LETTER
July 12, 2019      Dontrell Schulz  [address] [address]        To Whom It May Concern:    Dontrell Schulz  was seen on 07/10/2019 at the [facility] emergency department.  Please excuse his absence.         Sincerely,        Escobar Perry PA-C

## 2019-11-03 ENCOUNTER — HEALTH MAINTENANCE LETTER (OUTPATIENT)
Age: 39
End: 2019-11-03

## 2019-12-02 ENCOUNTER — OFFICE VISIT (OUTPATIENT)
Dept: FAMILY MEDICINE | Facility: OTHER | Age: 39
End: 2019-12-02
Payer: COMMERCIAL

## 2019-12-02 VITALS
SYSTOLIC BLOOD PRESSURE: 132 MMHG | OXYGEN SATURATION: 97 % | RESPIRATION RATE: 16 BRPM | TEMPERATURE: 97.3 F | WEIGHT: 315 LBS | HEART RATE: 96 BPM | HEIGHT: 71 IN | DIASTOLIC BLOOD PRESSURE: 74 MMHG | BODY MASS INDEX: 44.1 KG/M2

## 2019-12-02 DIAGNOSIS — F43.9 STRESS: ICD-10-CM

## 2019-12-02 DIAGNOSIS — I10 ESSENTIAL HYPERTENSION: Primary | ICD-10-CM

## 2019-12-02 DIAGNOSIS — Z72.0 TOBACCO USE: ICD-10-CM

## 2019-12-02 DIAGNOSIS — E66.01 MORBID OBESITY (H): ICD-10-CM

## 2019-12-02 DIAGNOSIS — E78.5 HYPERLIPIDEMIA LDL GOAL <160: ICD-10-CM

## 2019-12-02 LAB
CHOLEST SERPL-MCNC: 200 MG/DL
HDLC SERPL-MCNC: 54 MG/DL
LDLC SERPL CALC-MCNC: 130 MG/DL
NONHDLC SERPL-MCNC: 146 MG/DL
TRIGL SERPL-MCNC: 80 MG/DL

## 2019-12-02 PROCEDURE — 99214 OFFICE O/P EST MOD 30 MIN: CPT | Performed by: PHYSICIAN ASSISTANT

## 2019-12-02 PROCEDURE — 80061 LIPID PANEL: CPT | Performed by: PHYSICIAN ASSISTANT

## 2019-12-02 PROCEDURE — 36415 COLL VENOUS BLD VENIPUNCTURE: CPT | Performed by: PHYSICIAN ASSISTANT

## 2019-12-02 RX ORDER — LISINOPRIL 40 MG/1
40 TABLET ORAL DAILY
Qty: 90 TABLET | Refills: 3 | Status: SHIPPED | OUTPATIENT
Start: 2019-12-02 | End: 2020-11-29

## 2019-12-02 RX ORDER — AMLODIPINE BESYLATE 10 MG/1
10 TABLET ORAL DAILY
Qty: 90 TABLET | Refills: 3 | Status: SHIPPED | OUTPATIENT
Start: 2019-12-02 | End: 2020-11-29

## 2019-12-02 ASSESSMENT — MIFFLIN-ST. JEOR: SCORE: 2542.01

## 2019-12-02 ASSESSMENT — PAIN SCALES - GENERAL: PAINLEVEL: NO PAIN (0)

## 2019-12-02 NOTE — NURSING NOTE
Health Maintenance Due   Topic Date Due     PREVENTIVE CARE VISIT  1982     HIV SCREENING  03/01/1997     PNEUMOCOCCAL IMMUNIZATION 19-64 MEDIUM RISK (1 of 1 - PPSV23) 03/01/2001     LIPID  03/01/2017      Renetta Lugo LPN........12/2/2019 9:29 AM

## 2019-12-02 NOTE — PATIENT INSTRUCTIONS
Patient Education     Established High Blood Pressure    High blood pressure (hypertension) is a chronic disease. Often, healthcare providers don t know what causes it. But it can be caused by certain health conditions and medicines.  If you have high blood pressure, you may not have any symptoms. If you do have symptoms, they may include headache, dizziness, changes in your vision, chest pain, and shortness of breath. But even without symptoms, high blood pressure that s not treated raises your risk for heart attack, heart failure, and stroke. High blood pressure is a serious health risk and shouldn t be ignored.  Blood pressure measurements are given as 2 numbers. Systolic blood pressure is the upper number. This is the pressure when the heart contracts. Diastolic blood pressure is the lower number. This is the pressure when the heart relaxes between beats. You will see your blood pressure readings written together. For example, a person with a systolic pressure of 118 and a diastolic pressure of 78 will have 118/78 written in the medical record.  Blood pressure is categorized as normal, elevated, or stage 1 or stage 2 high blood pressure:    Normal blood pressure is systolic of less than 120 and diastolic of less than 80 (120/80)    Elevated blood pressure is systolic of 120 to 129 and diastolic less than 80    Stage 1 high blood pressure is systolic is 130 to 139 or diastolic between 80 to 89    Stage 2 high blood pressure is when systolic is 140 or higher or the diastolic is 90 or higher  Home care  If you have high blood pressure, follow these home care guidelines to help lower your blood pressure. If you are taking medicines for high blood pressure, these methods may reduce or end your need for medicines in the future.    Start a weight-loss program if you are overweight.    Cut back on how much salt you get in your diet. Here s how to do this:  ? Don t eat foods that have a lot of salt. These include  olives, pickles, smoked meats, and salted potato chips.  ? Don t add salt to your food at the table.  ? Use only small amounts of salt when cooking.    Start an exercise program. Talk with your healthcare provider about the type of exercise program that would be best for you. It doesn't have to be hard. Even brisk walking for 20 minutes 3 times a week is a good form of exercise.    Don t take medicines that stimulate the heart. This includes many over-the-counter cold and sinus decongestant pills and sprays, as well as diet pills. Check the warnings about high blood pressure on the label. Before buying any over-the-counter medicines or supplements, always ask the pharmacist about the product's potential interaction with your high blood pressure and your high blood pressure medicines.    Stimulants such as amphetamine or cocaine could be deadly for someone with high blood pressure. Never take these.    Limit how much caffeine you get in your diet. Switch to caffeine-free products.    Stop smoking. If you are a long-time smoker, this can be hard. Talk to your healthcare provider about medicines and nicotine replacement options to help you. Also, enroll in a stop-smoking program to make it more likely that you will quit for good.    Learn how to handle stress. This is an important part of any program to lower blood pressure. Learn about relaxation methods like meditation, yoga, or biofeedback.    If your provider prescribed medicines, take them exactly as directed. Missing doses may cause your blood pressure get out of control.    If you miss a dose or doses, check with your healthcare provider or pharmacist about what to do.    Consider buying an automatic blood pressure machine to check your blood pressure at home. Ask your provider for a recommendation. You can get one of these at most pharmacies.     The American Heart Association recommends the following guidelines for home blood pressure monitoring:    Don't  smoke or drink coffee for 30 minutes before taking your blood pressure.    Go to the bathroom before the test.    Relax for 5 minutes before taking the measurement.    Sit with your back supported (don't sit on a couch or soft chair); keep your feet on the floor uncrossed. Place your arm on a solid flat surface (like a table) with the upper part of the arm at heart level. Place the middle of the cuff directly above the bend of the elbow. Check the monitor's instruction manual for an illustration.    Take multiple readings. When you measure, take 2 to 3 readings one minute apart and record all of the results.    Take your blood pressure at the same time every day, or as your healthcare provider recommends.    Record the date, time, and blood pressure reading.    Take the record with you to your next medical appointment. If your blood pressure monitor has a built-in memory, simply take the monitor with you to your next appointment.    Call your provider if you have several high readings. Don't be frightened by a single high blood pressure reading, but if you get several high readings, check in with your healthcare provider.    Note: When blood pressure reaches a systolic (top number) of 180 or higher OR diastolic (bottom number) of 110 or higher, seek emergency medical treatment.  Follow-up care  You will need to see your healthcare provider regularly. This is to check your blood pressure and to make changes to your medicines. Make a follow-up appointment as directed. Bring the record of your home blood pressure readings to the appointment.  When to seek medical advice  Call your healthcare provider right away if any of these occur:    Blood pressure reaches a systolic (upper number) of 180 or higher OR a diastolic (bottom number) of 110 or higher    Chest pain or shortness of breath    Severe headache    Throbbing or rushing sound in the ears    Nosebleed    Sudden severe pain in your belly (abdomen)    Extreme  drowsiness, confusion, or fainting    Dizziness or spinning sensation (vertigo)    Weakness of an arm or leg or one side of the face    You have problems speaking or seeing   Date Last Reviewed: 12/1/2016 2000-2018 [employer]. [address] [place] [id number]. All rights reserved. This information is not intended as a substitute for professional medical care. Always follow your healthcare professional's instructions.

## 2019-12-02 NOTE — PROGRESS NOTES
Subjective     Dontrell Schulz is a 39 year old male who presents to clinic today for the following health issues:    HPI   Hypertension Follow-up      Do you check your blood pressure regularly outside of the clinic? No     Are you following a low salt diet? No    Are your blood pressures ever more than 140 on the top number (systolic) OR more   than 90 on the bottom number (diastolic), for example 140/90? No      How many servings of fruits and vegetables do you eat daily?  2-3    On average, how many sweetened beverages do you drink each day (Examples: soda, juice, sweet tea, etc.  Do NOT count diet or artificially sweetened beverages)?   1    How many days per week do you miss taking your medication? 0    Patient is a 39 year old male who presents today to follow up on HTN and hyperlipidemia in addition to discuss smoking cessation and weight loss. He has been taking amlodipine and lisinopril to manage his blood pressure, 132/74 today. Patient denies adverse effect from the medication. He has not had labs update in over a year. We discussed returning for a preventative later this month or next vs checking labs today as he is currently laid off and has the ability to do so. Patient declined stating that he does not wish to come to the clinic any more than he has to. Review of labs shows that CBC and CMP were updated in 07/2019 at ED visit. Patient is overweight and is working to reduce body weight with lifestyle changes. He says that he likes to stay busy during his days often spending a great deal of time outdoors. We discussed trying to get in 30 minutes of exercise daily in addition to other miscellaneous projects around the home. Patient has not been taking chantix recently due to increased stress in his life. He is interested in trying to quit again and we discussed strategies for breaking habits.     Reviewed and updated as needed this visit by Provider         Review of Systems   ROS COMP:  "Constitutional, HEENT, cardiovascular, pulmonary, gi and gu systems are negative, except as otherwise noted.      Objective    /74 (BP Location: Left arm, Patient Position: Sitting, Cuff Size: Adult Large)   Pulse 96   Temp 97.3  F (36.3  C) (Oral)   Resp 16   Ht 1.81 m (5' 11.26\")   Wt (!) 159.1 kg (350 lb 11.2 oz)   SpO2 97%   BMI 48.56 kg/m    Body mass index is 48.56 kg/m .  Physical Exam   GENERAL: healthy, alert and no distress  RESP: lungs clear to auscultation - no rales, rhonchi or wheezes  CV: regular rate and rhythm, normal S1 S2, no S3 or S4, no murmur, click or rub, no peripheral edema and peripheral pulses strong  ABDOMEN: soft, nontender, no hepatosplenomegaly, no masses and bowel sounds normal  MS: no gross musculoskeletal defects noted, no edema  NEURO: Normal strength and tone, mentation intact and speech normal  PSYCH: mentation appears normal, affect normal/bright    Diagnostic Test Results:  Results for orders placed or performed in visit on 12/02/19 (from the past 24 hour(s))   Lipid panel reflex to direct LDL Fasting   Result Value Ref Range    Cholesterol 200 (H) <200 mg/dL    Triglycerides 80 <150 mg/dL    HDL Cholesterol 54 >39 mg/dL    LDL Cholesterol Calculated 130 (H) <100 mg/dL    Non HDL Cholesterol 146 (H) <130 mg/dL           Assessment & Plan     1. Essential hypertension  Continue medications without change, adequately managing blood pressure.   - lisinopril (PRINIVIL/ZESTRIL) 40 MG tablet; Take 1 tablet (40 mg) by mouth daily  Dispense: 90 tablet; Refill: 3  - amLODIPine (NORVASC) 10 MG tablet; Take 1 tablet (10 mg) by mouth daily  Dispense: 90 tablet; Refill: 3    2. Hyperlipidemia LDL goal <160  Mild elevations patient will work to maintain a healthy diet low in salts/sugars/fatty&greasy foods with regular servings of fruits and vegetables. Also, try to get in 30 minutes of aerobic exercise 5 or more days each week as able.     - Lipid panel reflex to direct LDL " Fasting    3. Tobacco use  Consider restarting chantix and finding methods to break habits outside of snacking or soda.     4. Morbid obesity (H)  See above.     5. Stress  Discussed methods to reduce stress including exercise, sleep, relaxation techniques and counseling.       Return in about 1 year (around 12/2/2020) for Return for scheduled annual checkup with PCP.    Escobar Perry PA-C  [facility]

## 2020-03-09 DIAGNOSIS — Z72.0 TOBACCO USE: Primary | ICD-10-CM

## 2020-03-09 RX ORDER — NICOTINE 21 MG/24HR
1 PATCH, TRANSDERMAL 24 HOURS TRANSDERMAL EVERY 24 HOURS
Qty: 14 PATCH | Refills: 0 | Status: SHIPPED | OUTPATIENT
Start: 2020-03-09 | End: 2020-05-17 | Stop reason: DRUGHIGH

## 2020-03-09 RX ORDER — NICOTINE 21 MG/24HR
1 PATCH, TRANSDERMAL 24 HOURS TRANSDERMAL EVERY 24 HOURS
Qty: 42 PATCH | Refills: 0 | Status: SHIPPED | OUTPATIENT
Start: 2020-03-09 | End: 2020-04-07

## 2020-04-05 DIAGNOSIS — Z72.0 TOBACCO USE: ICD-10-CM

## 2020-04-07 RX ORDER — NICOTINE 21 MG/24HR
PATCH, TRANSDERMAL 24 HOURS TRANSDERMAL
Qty: 14 PATCH | Refills: 0 | Status: SHIPPED | OUTPATIENT
Start: 2020-04-07 | End: 2020-05-17 | Stop reason: DRUGHIGH

## 2020-04-07 NOTE — TELEPHONE ENCOUNTER
Prescription approved per [facility] Refill Protocol.    BELTRAN AlmeidaN, RN  [facility]

## 2020-04-07 NOTE — TELEPHONE ENCOUNTER
"Requested Prescriptions   Pending Prescriptions Disp Refills     nicotine (NICODERM CQ) 21 MG/24HR 24 hr patch [Pharmacy Med Name: NICOTINE 21MG/24HR PT24]  0     Sig: PLACE 1 PATCH ONTO THE SKIN AND REMOVE OLD ONE EVERY 24 HOURS FOR 6 WEEKS (USE FIRST)   Last Written Prescription Date:  3/9/2020  Last Fill Quantity: 42,  # refills: 0   Last office visit: 12/2/2019 with prescribing provider:  12/2/19   Future Office Visit:        Partial Cholinergic Nicotinic Agonist Agents Passed - 4/5/2020  9:22 AM        Passed - Blood pressure under 140/90 in past 12 months     BP Readings from Last 3 Encounters:   12/02/19 132/74   07/10/19 128/62   12/04/18 128/70                 Passed - Recent (12 mo) or future (30 days) visit within the authorizing provider's specialty     Patient has had an office visit with the authorizing provider or a provider within the authorizing providers department within the previous 12 mos or has a future within next 30 days. See \"Patient Info\" tab in inbasket, or \"Choose Columns\" in Meds & Orders section of the refill encounter.              Passed - Medication is active on med list        Passed - Patient is 18 years of age or older           "

## 2020-05-05 ENCOUNTER — MYC REFILL (OUTPATIENT)
Dept: FAMILY MEDICINE | Facility: OTHER | Age: 40
End: 2020-05-05

## 2020-05-05 DIAGNOSIS — Z72.0 TOBACCO USE: ICD-10-CM

## 2020-05-06 RX ORDER — NICOTINE 21 MG/24HR
PATCH, TRANSDERMAL 24 HOURS TRANSDERMAL
Qty: 14 PATCH | Refills: 0 | Status: CANCELLED | OUTPATIENT
Start: 2020-05-06

## 2020-05-06 RX ORDER — NICOTINE 21 MG/24HR
1 PATCH, TRANSDERMAL 24 HOURS TRANSDERMAL EVERY 24 HOURS
Qty: 14 PATCH | Refills: 0 | Status: CANCELLED | OUTPATIENT
Start: 2020-05-06

## 2020-05-06 NOTE — TELEPHONE ENCOUNTER
These were prescribed in March. If he didn't use them, they should still be good.   If he DID use them, he shouldn't be starting over with the higher dose. Please get more info on where he is at with his quitting.   I'll order them, but need to be sure he's using them properly.   Penelope Ovalles MD

## 2020-05-06 NOTE — TELEPHONE ENCOUNTER
Routing refill request to provider for review/approval because:  Patient looking for all 3 doses of Nicotine patches. Will need provider approval    SCOTT Almeida, RN  [facility]

## 2020-05-07 ENCOUNTER — MYC REFILL (OUTPATIENT)
Dept: FAMILY MEDICINE | Facility: OTHER | Age: 40
End: 2020-05-07

## 2020-05-07 DIAGNOSIS — Z72.0 TOBACCO USE: ICD-10-CM

## 2020-05-07 RX ORDER — NICOTINE 21 MG/24HR
1 PATCH, TRANSDERMAL 24 HOURS TRANSDERMAL EVERY 24 HOURS
Qty: 14 PATCH | Refills: 0 | Status: CANCELLED | OUTPATIENT
Start: 2020-05-07

## 2020-05-07 RX ORDER — NICOTINE 21 MG/24HR
PATCH, TRANSDERMAL 24 HOURS TRANSDERMAL
Qty: 14 PATCH | Refills: 0 | Status: CANCELLED | OUTPATIENT
Start: 2020-05-07

## 2020-05-07 NOTE — TELEPHONE ENCOUNTER
To: SURI      From: Dontrell Schulz      Created: 5/7/2020 9:58 AM        *-*-*This message has not been handled.*-*-*    I did start them and have used all the boxes.  I am unfortunately still smoking 4-5 cigarettes a day.  I am struggling with quitting.  Can I start over with them? Thanks for your help!

## 2020-05-07 NOTE — TELEPHONE ENCOUNTER
This is a duplicate request. Please see medication request from 5/6/2020 with notes from provider.     Sheree Yo MA

## 2020-05-08 NOTE — TELEPHONE ENCOUNTER
See mychart note to patient.  If he agrees, please go through the quit plan referral with him and fill in the information requested.  Penelope Ovalles MD

## 2020-07-15 DIAGNOSIS — Z72.0 TOBACCO USE: ICD-10-CM

## 2020-07-15 RX ORDER — NICOTINE 21 MG/24HR
PATCH, TRANSDERMAL 24 HOURS TRANSDERMAL
Qty: 28 PATCH | Refills: 1 | Status: SHIPPED | OUTPATIENT
Start: 2020-07-15 | End: 2021-02-26

## 2020-11-16 ENCOUNTER — HEALTH MAINTENANCE LETTER (OUTPATIENT)
Age: 40
End: 2020-11-16

## 2021-02-22 ENCOUNTER — TRANSFERRED RECORDS (OUTPATIENT)
Dept: HEALTH INFORMATION MANAGEMENT | Facility: CLINIC | Age: 41
End: 2021-02-22

## 2021-02-26 ENCOUNTER — VIRTUAL VISIT (OUTPATIENT)
Dept: FAMILY MEDICINE | Facility: CLINIC | Age: 41
End: 2021-02-26
Payer: COMMERCIAL

## 2021-02-26 ENCOUNTER — TELEPHONE (OUTPATIENT)
Dept: FAMILY MEDICINE | Facility: CLINIC | Age: 41
End: 2021-02-26

## 2021-02-26 DIAGNOSIS — I10 ESSENTIAL HYPERTENSION: ICD-10-CM

## 2021-02-26 PROCEDURE — 99213 OFFICE O/P EST LOW 20 MIN: CPT | Mod: 95 | Performed by: FAMILY MEDICINE

## 2021-02-26 RX ORDER — AMLODIPINE BESYLATE 10 MG/1
10 TABLET ORAL DAILY
Qty: 90 TABLET | Refills: 3 | Status: SHIPPED | OUTPATIENT
Start: 2021-02-26 | End: 2022-02-11

## 2021-02-26 RX ORDER — LISINOPRIL 40 MG/1
40 TABLET ORAL DAILY
Qty: 90 TABLET | Refills: 3 | Status: SHIPPED | OUTPATIENT
Start: 2021-02-26 | End: 2022-02-11

## 2021-02-26 NOTE — TELEPHONE ENCOUNTER
Left message for patient to call clinic back. Please assist per Dr. Horvath message below with scheduling.     Thank you,  Gill Hill MA 2/26/2021  4:59 PM        Please call patient to schedule fasting lab only appointment and nurse only BP recheck.   Raleigh Horvath MD

## 2021-02-26 NOTE — Clinical Note
Please call patient to schedule fasting lab only appointment and nurse only BP recheck.  Raleigh Horvath MD

## 2021-02-26 NOTE — PROGRESS NOTES
Dontrell is a 40 year old who is being evaluated via a billable video visit.      How would you like to obtain your AVS? MyChart  If the video visit is dropped, the invitation should be resent by: Text to cell phone: [phone] text invite for visit  Will anyone else be joining your video visit? No    Video Start Time: 4:33 PM    Assessment & Plan     Essential hypertension  Chronic, control uncertain as he does not monitor BP. The current medical regimen is effective;  continue present plan and medications. Follow up for fasting lab and nurse only BP recheck.  - amLODIPine (NORVASC) 10 MG tablet; Take 1 tablet (10 mg) by mouth daily  - lisinopril (ZESTRIL) 40 MG tablet; Take 1 tablet (40 mg) by mouth daily  - **Basic metabolic panel FUTURE anytime; Future             Tobacco Cessation:   reports that he has been smoking. He has been smoking about 1.00 pack per day. He has never used smokeless tobacco.  Tobacco Cessation Action Plan: Information offered: Patient not interested at this time    SELF MONITORING:       - Please check blood pressure readings several times per week  Work on weight loss  Regular exercise    Return in about 2 weeks (around 3/12/2021) for Follow up, in person fasting lab and nurse only BP recheck..    Raleigh Horvath MD  [facility]   Dontrell is a 40 year old who presents for the following health issues     HPI       Hypertension Follow-up      Do you check your blood pressure regularly outside of the clinic? No     Are you following a low salt diet? Yes    Are your blood pressures ever more than 140 on the top number (systolic) OR more   than 90 on the bottom number (diastolic), for example 140/90? Not checking      How many servings of fruits and vegetables do you eat daily?  4 or more    On average, how many sweetened beverages do you drink each day (Examples: soda, juice, sweet tea, etc.  Do NOT count diet or artificially sweetened beverages)?    0    How many days per week do you exercise enough to make your heart beat faster? 3 or less    How many minutes a day do you exercise enough to make your heart beat faster? 10 - 19    How many days per week do you miss taking your medication? 0    Patient Active Problem List   Diagnosis     Lower extremity numbness     Morbid obesity -- BMI 45.5     Essential hypertension     Transverse myelitis (H)     Current Outpatient Medications   Medication Sig Dispense Refill     amLODIPine (NORVASC) 10 MG tablet Take 1 tablet (10 mg) by mouth daily - due for office checkup prior to additional refills 30 tablet 0     lisinopril (ZESTRIL) 40 MG tablet Take 1 tablet (40 mg) by mouth daily - due for office checkup prior to additional refills 30 tablet 0     meclizine (ANTIVERT) 25 MG tablet Take 1 tablet (25 mg) by mouth 3 times daily as needed for dizziness (Patient not taking: Reported on 12/2/2019) 30 tablet 0     order for DME Thigh High HEIDI stockings (Patient not taking: Reported on 12/4/2018) 1 each 1         Review of Systems   CONSTITUTIONAL: NEGATIVE for fever, chills, change in weight  ENT/MOUTH: NEGATIVE for ear, mouth and throat problems  RESP: NEGATIVE for significant cough or SOB  CV: NEGATIVE for chest pain, palpitations or peripheral edema      Objective           Vitals:  No vitals were obtained today due to virtual visit.    Physical Exam   GENERAL: healthy, alert, no distress and obese  EYES: Eyes grossly normal to inspection.  No discharge or erythema, or obvious scleral/conjunctival abnormalities.  RESP: No audible wheeze, cough, or visible cyanosis.  No visible retractions or increased work of breathing.    SKIN: Visible skin clear. No significant rash, abnormal pigmentation or lesions.  NEURO: Cranial nerves grossly intact.  Mentation and speech appropriate for age.  PSYCH: Mentation appears normal, affect normal/bright, judgement and insight intact, normal speech and appearance well-groomed.    Office  Visit on 12/02/2019   Component Date Value Ref Range Status     Cholesterol 12/02/2019 200* <200 mg/dL Final    Desirable:       <200 mg/dl     Triglycerides 12/02/2019 80  <150 mg/dL Final     HDL Cholesterol 12/02/2019 54  >39 mg/dL Final     LDL Cholesterol Calculated 12/02/2019 130* <100 mg/dL Final    Comment: Above desirable:  100-129 mg/dl  Borderline High:  130-159 mg/dL  High:             160-189 mg/dL  Very high:       >189 mg/dl       Non HDL Cholesterol 12/02/2019 146* <130 mg/dL Final    Comment: Above Desirable:  130-159 mg/dl  Borderline high:  160-189 mg/dl  High:             190-219 mg/dl  Very high:       >219 mg/dl                   Video-Visit Details    Type of service:  Video Visit    Video End Time:4:45 PM    Originating Location (pt. Location): Home    Distant Location (provider location):  [facility]     Platform used for Video Visit: StevoWell

## 2021-03-01 ENCOUNTER — ALLIED HEALTH/NURSE VISIT (OUTPATIENT)
Dept: FAMILY MEDICINE | Facility: CLINIC | Age: 41
End: 2021-03-01
Payer: COMMERCIAL

## 2021-03-01 ENCOUNTER — TRANSFERRED RECORDS (OUTPATIENT)
Dept: HEALTH INFORMATION MANAGEMENT | Facility: CLINIC | Age: 41
End: 2021-03-01

## 2021-03-01 ENCOUNTER — MYC MEDICAL ADVICE (OUTPATIENT)
Dept: FAMILY MEDICINE | Facility: CLINIC | Age: 41
End: 2021-03-01

## 2021-03-01 VITALS — DIASTOLIC BLOOD PRESSURE: 70 MMHG | SYSTOLIC BLOOD PRESSURE: 136 MMHG

## 2021-03-01 DIAGNOSIS — I10 ESSENTIAL HYPERTENSION: Primary | ICD-10-CM

## 2021-03-01 DIAGNOSIS — I10 ESSENTIAL HYPERTENSION: ICD-10-CM

## 2021-03-01 LAB
ANION GAP SERPL CALCULATED.3IONS-SCNC: 4 MMOL/L (ref 3–14)
BUN SERPL-MCNC: 16 MG/DL (ref 7–30)
CALCIUM SERPL-MCNC: 10.7 MG/DL (ref 8.5–10.1)
CHLORIDE SERPL-SCNC: 109 MMOL/L (ref 94–109)
CO2 SERPL-SCNC: 25 MMOL/L (ref 20–32)
CREAT SERPL-MCNC: 0.68 MG/DL (ref 0.66–1.25)
GFR SERPL CREATININE-BSD FRML MDRD: >90 ML/MIN/{1.73_M2}
GLUCOSE SERPL-MCNC: 88 MG/DL (ref 70–99)
POTASSIUM SERPL-SCNC: 4.2 MMOL/L (ref 3.4–5.3)
SODIUM SERPL-SCNC: 138 MMOL/L (ref 133–144)

## 2021-03-01 PROCEDURE — 80048 BASIC METABOLIC PNL TOTAL CA: CPT | Performed by: FAMILY MEDICINE

## 2021-03-01 PROCEDURE — 99207 PR NO CHARGE NURSE ONLY: CPT

## 2021-03-01 PROCEDURE — 36415 COLL VENOUS BLD VENIPUNCTURE: CPT | Performed by: FAMILY MEDICINE

## 2021-03-01 NOTE — TELEPHONE ENCOUNTER
Patient stated he was never told to fast so he has his appt for lab today and he is declining to come in for a bp check this afternoon he will not reschedule lab as he took the day off today.     Yasmin Valdovinos MA 3/1/2021

## 2021-06-30 ENCOUNTER — MYC MEDICAL ADVICE (OUTPATIENT)
Dept: FAMILY MEDICINE | Facility: CLINIC | Age: 41
End: 2021-06-30

## 2021-06-30 DIAGNOSIS — E66.01 MORBID OBESITY (H): Primary | ICD-10-CM

## 2021-07-20 ENCOUNTER — MYC MEDICAL ADVICE (OUTPATIENT)
Dept: FAMILY MEDICINE | Facility: CLINIC | Age: 41
End: 2021-07-20

## 2021-07-29 ENCOUNTER — OFFICE VISIT (OUTPATIENT)
Dept: FAMILY MEDICINE | Facility: CLINIC | Age: 41
End: 2021-07-29
Payer: COMMERCIAL

## 2021-07-29 VITALS
HEIGHT: 72 IN | SYSTOLIC BLOOD PRESSURE: 138 MMHG | WEIGHT: 315 LBS | HEART RATE: 76 BPM | TEMPERATURE: 96.8 F | BODY MASS INDEX: 42.66 KG/M2 | OXYGEN SATURATION: 99 % | RESPIRATION RATE: 20 BRPM | DIASTOLIC BLOOD PRESSURE: 80 MMHG

## 2021-07-29 DIAGNOSIS — E66.01 CLASS 3 SEVERE OBESITY DUE TO EXCESS CALORIES WITHOUT SERIOUS COMORBIDITY WITH BODY MASS INDEX (BMI) OF 45.0 TO 49.9 IN ADULT (H): ICD-10-CM

## 2021-07-29 DIAGNOSIS — E66.813 CLASS 3 SEVERE OBESITY DUE TO EXCESS CALORIES WITHOUT SERIOUS COMORBIDITY WITH BODY MASS INDEX (BMI) OF 45.0 TO 49.9 IN ADULT (H): ICD-10-CM

## 2021-07-29 DIAGNOSIS — M17.11 PRIMARY OSTEOARTHRITIS OF RIGHT KNEE: Primary | ICD-10-CM

## 2021-07-29 PROCEDURE — 20610 DRAIN/INJ JOINT/BURSA W/O US: CPT | Mod: RT | Performed by: PHYSICIAN ASSISTANT

## 2021-07-29 RX ORDER — TRIAMCINOLONE ACETONIDE 40 MG/ML
80 INJECTION, SUSPENSION INTRA-ARTICULAR; INTRAMUSCULAR ONCE
Status: COMPLETED | OUTPATIENT
Start: 2021-07-29 | End: 2021-07-29

## 2021-07-29 RX ADMIN — TRIAMCINOLONE ACETONIDE 80 MG: 40 INJECTION, SUSPENSION INTRA-ARTICULAR; INTRAMUSCULAR at 07:28

## 2021-07-29 ASSESSMENT — MIFFLIN-ST. JEOR: SCORE: 2577.44

## 2021-07-29 ASSESSMENT — PAIN SCALES - GENERAL: PAINLEVEL: MILD PAIN (2)

## 2021-07-29 NOTE — NURSING NOTE
Health Maintenance Due   Topic Date Due     PREVENTIVE CARE VISIT  Never done     ANNUAL REVIEW OF HM ORDERS  Never done     ADVANCE CARE PLANNING  Never done     Pneumococcal Vaccine: Pediatrics (0 to 5 Years) and At-Risk Patients (6 to 64 Years) (1 of 2 - PPSV23) Never done     HIV SCREENING  Never done     HEPATITIS C SCREENING  Never done     Myra WILSON LPN

## 2021-07-29 NOTE — PATIENT INSTRUCTIONS
Patient Education     Cortisone Injections  Cortisone is a type of steroid. It can greatly reduce swelling, redness, inflammation, and pain. Being injected with cortisone is simple and doesn t take long. Your doctor may ask you questions about your health. Cortisone can affect certain health conditions, such as diabetes.     Why have a cortisone injection?  Injecting cortisone can sometimes relieve pain for anything from a sports injury to arthritis. Your doctor may suggest an injection if rest, splints, physical therapy, rehabilitation exercises, or oral medicine doesn t relieve your pain. Injecting cortisone is simpler than having surgery. And cortisone may provide the lasting pain relief that can help you get out and enjoy life again. Be sure to discuss all options with your healthcare provider. Injections are usually used no more than 3 to 4 times a year in one area of the body.   Getting the injection  Your doctor will start by cleaning and possibly numbing your skin at the injection site. Next you ll be injected with local anesthetics (for short-term pain relief) and cortisone. The injection may last a few moments. A small bandage will be put over the injection site. You ll then be ready to go home.   After your injection  After being injected, make sure you don t injure the treated region. But stay active. Enjoy a walk or some other mild activity. Just be careful not to strain the region that gave you trouble.   The next day  Some people feel more pain after being injected. This is normal, and it will go away soon. Applying ice for 20 minutes at a time to your injury may reduce the increased pain. Rest for the first day or two. You don t need to stay in bed. But avoid tasks that may strain the injured region.   Cortisone injections and diabetes  Cortisone injections can increase your blood sugar for several days after the injection. If you have diabetes, watch your blood sugar closely during this time.  Follow your regular plan for what to do when your blood sugar is elevated.   myhub last reviewed this educational content on 6/1/2020 2000-2021 [employer]. All rights reserved. This information is not intended as a substitute for professional medical care. Always follow your healthcare professional's instructions.           Patient Education     Mediterranean Diet  A heart healthy eating plan  The Mediterranean Diet is based on the eating habits of people in countries near the [place]. People living in this part of the world have long lives and low rates of chronic diseases. They have lower rates of death from heart disease, cancer and other illnesses.  When you follow this eating plan you'll have better control of your blood sugar and weight. The plan requires simple changes in your habits of eating, and the whole family can take part.  Mediterranean lifestyle   Enjoy eating with others. Sit at the table with family and friends and enjoy your meal. When you eat slowly, you are able to tune in to your body's hunger and fullness signals. You're less likely to overeat.  Be physically active: Being active every day is important for overall good health. Run, walk, dance and do lighter activities such as house and yard work. Move more and sit less!  Drink plenty of water during the day.  Drink red wine with meals in modest amounts (optional).  The Mediterranean Pyramid   The pyramid shows the food groups and the amounts eaten in relation to the whole diet. It is more than a diet; it is also a life-style plan.  The largest food group at bottom contains plant foods: vegetables, fruits, grains, nuts, legumes, seeds, olives and olive oil. These foods make up the largest part of your meals.   The next groups above: fish and seafood, then poultry, cheese, eggs and yogurt are eaten less often and in smaller servings.   The top group, meats and sweets, are eaten the least often and in the smallest  "amounts.    Tips for adding plant foods to your meals   Vegetables and fruits:     Aim for 3 to 8 servings each day. A serving is   to 2 cups, depending on the food.    Choose a variety of colors. Big green salads are a great way to include several vegetable servings.    Try fresh fruit as a dessert: oranges, grapes, apples pomegranates or fresh figs.    At home keep fresh fruit in a bowl to tempt family.    Bring fruit and vegetables to work for a snack.  Oil     Replace butter and margarine with healthy fats such as olive oil. Other plant-based oils are canola, walnut and peanut oil. These are high in good fats. Use them in cooking, salad dressings and baking.    Drizzle your bread with olive oil instead of butter or margarine. Use herbs and spices to add flavor and aroma and to reduce fat and salt when cooking.  Whole grains    Look for the term \"whole\" or \"whole grain\" on the package. Processing grains removes vitamins, minerals and fiber. Whole grains may include: corn, wheat, oats, rye, rice and barley.    Examples of Mediterranean grains include: barley, farro, buckwheat, bulgur, couscous, and wheatberries.    Slowly switch to a whole grain by using whole-grain blends of pastas and rice. Or mix whole grains with refined; for example, mix whole-wheat pasta with white pasta.  Beans and legumes     Beans are a good source of protein and fiber, adding flavor and texture to dishes. Examples include cannellini beans, chickpea, manda beans, green beans, kidney beans, lentils and split peas.    Cook a vegetarian meal one night a week: use beans or legumes with vegetables and grains.    Nuts are high in healthy fats. Try walnuts, almonds, pine nuts, hazelnuts and cashews. Avoid candied, honey-roasted and heavily salted nuts.    Limit your intake of nuts to a small handful each day. Explore ways to add to nuts to salads and other dishes.  Tips for using fish and seafood in your meals    Aim for meals with fish or " shellfish at least twice a week.    Tuna, herring, salmon and sardines are rich in heart-healthy omega-3. Shellfish, such as mussels, oysters and clams, have similar benefits for brain and heart health.  Tips for using poultry, eggs and cheese and yogurt in your meals    Eat poultry or eggs at least twice a week.    Roast, broil or grill your poultry. Season with fresh or dried herbs.    Enjoy low-fat cheese or yogurt every day.  Tips for using red meat and sweets in your meals     Limit lean red meat to one time a week or 4 times a month.    Red meat has more saturated fats. Choose a lean cut, like top loin, sirloin, flank steak, strip steak or 90% lean ground beef.    Limit portions to 3 to 4 ounces.    Make whole grains and vegetables the main focus of a meal. Add meat in small amounts for flavor.    Limit sweets such as ice cream or cookies for a special times or holidays.  Snacks    Snack on a handful of almonds, walnuts or sunflower seeds in place of chips, cookies or other processed snack foods.    Calcium-rich, low-fat cheese or low- and nonfat plain yogurt with fresh fruit are healthy snacks that are easy to take with you.  Like to know more?  For tips on shoppping, cooking and eating well the Mediterranean way, go to the [facility] website at www.UUSEE.org.  For informational purposes only. Not to replace the advice of your health care provider.   Copyright   2014 [facility]. All rights reserved.   Mediterranean pyramrid used with permission of the [facility] Organization. Clarisonic 170817 - 09/15.

## 2021-07-29 NOTE — PROGRESS NOTES
Assessment & Plan     1. Primary osteoarthritis of right knee    2. Class 3 severe obesity due to excess calories without serious comorbidity with body mass index (BMI) of 45.0 to 49.9 in adult (H)      A steroid injection was performed at right lateral knee using 1% plain Lidocaine and 80 mg of Kenalog. This was well tolerated. Patient was given education on timeframe for benefit and advised to call if he is not improving as expected. He was advised to participate in PT by external orthopedist. I am in support of this recommendation and offered PT referral today. Patient says that this is a busy time for his work. He will consider this fall or winter, or if not improving may be able to make time. He is motivated to lose weight and recognizes that this will help his knees. We discussed methods including calorie monitoring with Fara or similar reese, mediterranean diet or following a diabetic diet as this is a healthy diet as well.        Return in about 6 months (around 1/29/2022) for Return for scheduled annual checkup with PCP.    JACQUES Clifford [facility] System[facility]SE ALEJANDRO Jon is a 41 year old who presents for the following health issues     HPI     Musculoskeletal problem/pain  Onset/Duration: 6 months-1 year  Description  Location: knee - right  Joint Swelling: YES  Redness: no  Pain: YES  Warmth: YES  Intensity:  moderate, severe  Progression of Symptoms:  worsening  Accompanying signs and symptoms:   Fevers: no  Numbness/tingling/weakness: no  History  Trauma to the area: YES 4-5 years ago  Recent illness:  no  Previous similar problem: no  Previous evaluation:  YES, [facility]  Precipitating or alleviating factors:  Aggravating factors include: walking and overuse  Therapies tried and outcome: glucosamine chondroitin; no change     Review of Systems   Constitutional, HEENT, cardiovascular, pulmonary, gi and gu systems are negative, except as otherwise  "noted.      Objective    /80 (BP Location: Right arm, Patient Position: Chair, Cuff Size: Adult Large)   Pulse 76   Temp 96.8  F (36  C) (Temporal)   Resp 20   Ht 1.822 m (5' 11.75\")   Wt (!) 162.8 kg (359 lb)   SpO2 99%   BMI 49.03 kg/m    Body mass index is 49.03 kg/m .  Physical Exam   GENERAL: healthy, alert and no distress  RESP: lungs clear to auscultation - no rales, rhonchi or wheezes  CV: regular rate and rhythm, normal S1 S2, no S3 or S4, no murmur, click or rub, no peripheral edema and peripheral pulses strong  PSYCH: mentation appears normal, affect normal/bright    No results found for any visits on 07/29/21.            "

## 2021-09-17 ENCOUNTER — E-VISIT (OUTPATIENT)
Dept: URGENT CARE | Facility: CLINIC | Age: 41
End: 2021-09-17
Payer: COMMERCIAL

## 2021-09-17 DIAGNOSIS — Z20.822 SUSPECTED COVID-19 VIRUS INFECTION: Primary | ICD-10-CM

## 2021-09-17 PROCEDURE — 99421 OL DIG E/M SVC 5-10 MIN: CPT | Performed by: NURSE PRACTITIONER

## 2021-09-17 NOTE — PATIENT INSTRUCTIONS
Dear Dontrell Schulz,    Your symptoms show that you may have coronavirus (COVID-19). This illness can cause fever, cough and trouble breathing. Many people get a mild case and get better on their own. Some people can get very sick.    Will I be tested for COVID-19?  We would like to test you for Covid-19 virus. I have placed orders for this test.     To schedule: go to your [employer] home page and scroll down to the section that says  You have an appointment that needs to be scheduled  and click the large green button that says  Schedule Now  and follow the steps to find the next available openings.    If you are unable to complete these [employer] scheduling steps, please call [phone] to schedule your testing.     Return to work/school/ guidance:  Please let your workplace manager and staffing office know when your quarantine ends     We can t give you an exact date as it depends on the above. You can calculate this on your own or work with your manager/staffing office to calculate this. (For example if you were exposed on 10/4, you would have to quarantine for 14 full days. That would be through 10/18. You could return on 10/19.)      If you receive a positive COVID-19 test result, follow the guidance of the those who are giving you the results. Usually the return to work is 10 (or in some cases 20 days from symptom onset.) If you work at [facility], you must also be cleared by Employee Occupational Health and Safety to return to work.        If you receive a negative COVID-19 test result and did not have a high risk exposure to someone with a known positive COVID-19 test, you can return to work once you're free of fever for 24 hours without fever-reducing medication and your symptoms are improving or resolved.      If you receive a negative COVID-19 test and If you had a high risk exposure to someone who has tested positive for COVID-19 then you can return to work 14 days after your last  contact with the positive individual    Note: If you have ongoing exposure to the covid positive person, this quarantine period may be more than 14 days. (For example, if you are continued to be exposed to your child who tested positive and cannot isolate from them, then the quarantine of 7-14 days can't start until your child is no longer contagious. This is typically 10 days from onset of the child's symptoms. So the total duration may be 17-24 days in this case.)    Sign up for netTALK.   We know it's scary to hear that you might have COVID-19. We want to track your symptoms to make sure you're okay over the next 2 weeks. Please look for an email from netTALK--this is a free, online program that we'll use to keep in touch. To sign up, follow the link in the email you will receive. Learn more at [email or web address]    How can I take care of myself?    Get lots of rest. Drink extra fluids (unless a doctor has told you not to)    Take Tylenol (acetaminophen) or ibuprofen for fever or pain. If you have liver or kidney problems, ask your family doctor if it's okay to take Tylenol o ibuprofen    If you have other health problems (like cancer, heart failure, an organ transplant or severe kidney disease): Call your specialty clinic if you don't feel better in the next 2 days.    Know when to call 911. Emergency warning signs include:  o Trouble breathing or shortness of breath  o Pain or pressure in the chest that doesn't go away  o Feeling confused like you haven't felt before, or not being able to wake up  o Bluish-colored lips or face    Where can I get more information?  [facility] - About COVID-19:   [email or web address]    CDC - What to Do If You're Sick:   www.cdc.gov/coronavirus/2019-ncov/about/steps-when-sick.html    September 17, 2021  RE:  Dontrell Schulz                                                                                                                   [id number] [facility] [facility] [address]      To whom it may concern:    I evaluated Dontrell Schulz on September 17, 2021. Dontrell Schulz should be excused from work/school.     They should let their workplace manager and staffing office know when their quarantine ends.    We can not give an exact date as it depends on the information below. They can calculate this on their own or work with their manager/staffing office to calculate this. (For example if they were exposed on 10/04, they would have to quarantine for 14 full days. That would be through 10/18. They could return on 10/19.)    Quarantine Guidelines:      If patient receives a positive COVID-19 test result, they should follow the guidance of those who are giving the results. Usually the return to work is 10 (or in some cases 20 days from symptom onset.) If they work at [employer] they must be cleared by Employee Occupational Health and Safety to return to work.        If patient receives a negative COVID-19 test result and did not have a high risk exposure to someone with a known positive COVID-19 test, they can return to work once they're free of fever for 24 hours without fever-reducing medication and their symptoms are improving or resolved.      If patient receives a negative COVID-19 test and if they had a high risk exposure to someone who has tested positive for COVID-19 then they can return to work 14 days after their last contact with the positive individual    Note: If there is ongoing exposure to the covid positive person, this quarantine period may be longer than 14 days. (For example, if they are continually exposed to their child, who tested positive and cannot isolate from them, then the quarantine of 7-14 days can't start until their child is no longer contagious. This is typically 10 days from onset to the child's symptoms. So the total duration may be 17-24 days in this case.)     Sincerely,  PATRICK Matute CNP

## 2021-09-18 ENCOUNTER — HEALTH MAINTENANCE LETTER (OUTPATIENT)
Age: 41
End: 2021-09-18

## 2021-10-27 ENCOUNTER — MYC MEDICAL ADVICE (OUTPATIENT)
Dept: FAMILY MEDICINE | Facility: CLINIC | Age: 41
End: 2021-10-27

## 2021-11-09 ENCOUNTER — OFFICE VISIT (OUTPATIENT)
Dept: FAMILY MEDICINE | Facility: CLINIC | Age: 41
End: 2021-11-09
Payer: COMMERCIAL

## 2021-11-09 VITALS
BODY MASS INDEX: 51.19 KG/M2 | HEART RATE: 84 BPM | SYSTOLIC BLOOD PRESSURE: 132 MMHG | DIASTOLIC BLOOD PRESSURE: 80 MMHG | WEIGHT: 315 LBS | OXYGEN SATURATION: 96 % | TEMPERATURE: 98.3 F

## 2021-11-09 DIAGNOSIS — E66.01 MORBID OBESITY (H): ICD-10-CM

## 2021-11-09 DIAGNOSIS — M17.11 PRIMARY OSTEOARTHRITIS OF RIGHT KNEE: Primary | ICD-10-CM

## 2021-11-09 PROCEDURE — 99212 OFFICE O/P EST SF 10 MIN: CPT | Mod: 25 | Performed by: PHYSICIAN ASSISTANT

## 2021-11-09 PROCEDURE — 20610 DRAIN/INJ JOINT/BURSA W/O US: CPT | Performed by: PHYSICIAN ASSISTANT

## 2021-11-09 RX ORDER — TRIAMCINOLONE ACETONIDE 40 MG/ML
80 INJECTION, SUSPENSION INTRA-ARTICULAR; INTRAMUSCULAR ONCE
Status: COMPLETED | OUTPATIENT
Start: 2021-11-09 | End: 2021-11-09

## 2021-11-09 RX ADMIN — TRIAMCINOLONE ACETONIDE 80 MG: 40 INJECTION, SUSPENSION INTRA-ARTICULAR; INTRAMUSCULAR at 11:23

## 2021-11-09 NOTE — PROGRESS NOTES
Assessment & Plan     1. Primary osteoarthritis of right knee    2. Morbid obesity -- BMI 45.5      Patient has ongoing pain and tightness in the right knee attributed to past injury from being kicked by a cow. Responded well to cortisone this past summer. Repeated injection today and recommended PT, but he declined. Says that this is a busy time of the year. Patient would benefit from PT as well as weight loss. He will consider and call if he would like the referral.     On exam the patella appeared to track more laterally with flexion of the right knee. PT to strengthen quadriceps would be beneficial.       Return in about 6 months (around 5/9/2022) for Return for scheduled annual checkup with PCP.    JACQUES Clifford [facility]   Dontrell is a 41 year old who presents for the following health issues     HPI     Cortisone shot in knee    Patient is a 41 year old male who presents today with concerns about returning pain and discomfort in the right knee. He was seen this past July 2021 for pain in this joint. He attributes the pain to having been kicked by a cow in the right knee in his past. He improved with cortisone injection and was advised on healthy habits to lose weight and strengthen knee. He would like another injection today. We discussed caution with frequent injections as this can degrade tissues within the joint. Discussed meeting with orthopedics to see if he would qualify for rooster comb.     Review of Systems   Constitutional, HEENT, cardiovascular, pulmonary, gi and gu systems are negative, except as otherwise noted.      Objective    /80   Pulse 84   Temp 98.3  F (36.8  C) (Temporal)   Wt (!) 170 kg (374 lb 12.8 oz)   SpO2 96%   BMI 51.19 kg/m    Body mass index is 51.19 kg/m .  Physical Exam   GENERAL: healthy, alert and no distress  NECK: no adenopathy, no asymmetry, masses, or scars and thyroid normal to palpation  RESP: lungs clear to  auscultation - no rales, rhonchi or wheezes  CV: regular rate and rhythm, normal S1 S2, no S3 or S4, no murmur, click or rub, no peripheral edema and peripheral pulses strong  ABDOMEN: soft, nontender, no hepatosplenomegaly, no masses and bowel sounds normal  MS: normal AROM of knees and hips, patella of the right knee appeared to track laterally with flexion.   PSYCH: mentation appears normal, affect normal/bright    A steroid injection was performed at right lateral knee using 1% plain Lidocaine and 80 mg of Kenalog. This was well tolerated.

## 2021-11-09 NOTE — PATIENT INSTRUCTIONS
Patient Education     Cortisone Injections  Cortisone is a type of steroid. It can greatly reduce swelling, redness, inflammation, and pain. Being injected with cortisone is simple and doesn t take long. Your doctor may ask you questions about your health. Cortisone can affect certain health conditions, such as diabetes.     Why have a cortisone injection?  Injecting cortisone can sometimes relieve pain for anything from a sports injury to arthritis. Your doctor may suggest an injection if rest, splints, physical therapy, rehabilitation exercises, or oral medicine doesn t relieve your pain. Injecting cortisone is simpler than having surgery. And cortisone may provide the lasting pain relief that can help you get out and enjoy life again. Be sure to discuss all options with your healthcare provider. Injections are usually used no more than 3 to 4 times a year in one area of the body.   Getting the injection  Your doctor will start by cleaning and possibly numbing your skin at the injection site. Next you ll be injected with local anesthetics (for short-term pain relief) and cortisone. The injection may last a few moments. A small bandage will be put over the injection site. You ll then be ready to go home.   After your injection  After being injected, make sure you don t injure the treated region. But stay active. Enjoy a walk or some other mild activity. Just be careful not to strain the region that gave you trouble.   The next day  Some people feel more pain after being injected. This is normal, and it will go away soon. Applying ice for 20 minutes at a time to your injury may reduce the increased pain. Rest for the first day or two. You don t need to stay in bed. But avoid tasks that may strain the injured region.   Cortisone injections and diabetes  Cortisone injections can increase your blood sugar for several days after the injection. If you have diabetes, watch your blood sugar closely during this time.  Follow your regular plan for what to do when your blood sugar is elevated.   [employer] last reviewed this educational content on 6/1/2020 2000-2021 [employer]. All rights reserved. This information is not intended as a substitute for professional medical care. Always follow your healthcare professional's instructions.

## 2021-12-27 ENCOUNTER — E-VISIT (OUTPATIENT)
Dept: FAMILY MEDICINE | Facility: CLINIC | Age: 41
End: 2021-12-27
Payer: COMMERCIAL

## 2021-12-27 DIAGNOSIS — J01.90 ACUTE SINUSITIS WITH SYMPTOMS > 10 DAYS: Primary | ICD-10-CM

## 2021-12-27 PROCEDURE — 99421 OL DIG E/M SVC 5-10 MIN: CPT | Performed by: PHYSICIAN ASSISTANT

## 2021-12-28 NOTE — PATIENT INSTRUCTIONS
Sinusitis (Antibiotic Treatment)    The sinuses are air-filled spaces within the bones of the face. They connect to the inside of the nose. Sinusitis is an inflammation of the tissue that lines the sinuses. Sinusitis can occur during a cold. It can also happen due to allergies to pollens and other particles in the air. Sinusitis can cause symptoms of sinus congestion and a feeling of fullness. A sinus infection causes fever, headache, and facial pain. There is often green or yellow fluid draining from the nose or into the back of the throat (post-nasal drip). You have been given antibiotics to treat this condition.   Home care    Take the full course of antibiotics as instructed. Don't stop taking them, even when you feel better.    Drink plenty of water, hot tea, and other liquids as directed by the healthcare provider. This may help thin nasal mucus. It also may help your sinuses drain fluids.    Heat may help soothe painful areas of your face. Use a towel soaked in hot water. Or,  the shower and direct the warm spray onto your face. Using a vaporizer along with a menthol rub at night may also help soothe symptoms.     An expectorant with guaifenesin may help thin nasal mucus and help your sinuses drain fluids. Talk with your provider or pharmacists before taking an over-the-counter (OTC) medicine if you have any questions about it or its side effects..    You can use an OTC decongestant, unless a similar medicine was prescribed to you. Nasal sprays work the fastest. Use one that contains phenylephrine or oxymetazoline. First blow your nose gently. Then use the spray. Don't use these medicines more often than directed on the label. If you do, your symptoms may get worse. You may also take pills that contain pseudoephedrine. Don t use products that combine multiple medicines. This is because side effects may be increased. Read labels. You can also ask the pharmacist for help. (People with high blood  pressure should not use decongestants. They can raise blood pressure.) Talk with your provider or pharmacist if you have any questions about the medicine..    OTC antihistamines may help if allergies contributed to your sinusitis. Talk with your provider or pharmacist if you have any questions about the medicine..    Don't use nasal rinses or irrigation during an acute sinus infection, unless your healthcare provider tells you to. Rinsing may spread the infection to other areas in your sinuses.    Use acetaminophen or ibuprofen to control pain, unless another pain medicine was prescribed to you. If you have chronic liver or kidney disease or ever had a stomach ulcer, talk with your healthcare provider before using these medicines. Never give aspirin to anyone under age 18 who is ill with a fever. It may cause severe liver damage.    Don't smoke. This can make symptoms worse.    Follow-up care  Follow up with your healthcare provider, or as advised.   When to seek medical advice  Call your healthcare provider if any of these occur:     Facial pain or headache that gets worse    Stiff neck    Unusual drowsiness or confusion    Swelling of your forehead or eyelids    Symptoms don't go away in 10 days    Vision problems, such as blurred or double vision    Fever of 100.4 F (38 C) or higher, or as directed by your healthcare provider  Call 911  Call 911 if any of these occur:     Seizure    Trouble breathing    Feeling dizzy or faint    Fingernails, skin or lips look blue, purple , or gray  Prevention  Here are steps you can take to help prevent an infection:     Keep good hand washing habits.    Don t have close contact with people who have sore throats, colds, or other upper respiratory infections.    Don t smoke, and stay away from secondhand smoke.    Stay up to date with of your vaccines.  [email or web address] last reviewed this educational content on 12/1/2019 2000-2021 [employer]. All rights reserved. This  information is not intended as a substitute for professional medical care. Always follow your healthcare professional's instructions.        Dear Dontrell Schulz    After reviewing your responses, I've been able to diagnose you with?a sinus infection caused by bacteria.?     Based on your responses and diagnosis, I have prescribed antibiotics to treat your symptoms. I have sent this to your pharmacy.?     It is also important to stay well hydrated, get lots of rest and take over-the-counter decongestants,?tylenol?or ibuprofen if you?are able to?take those medications per your primary care provider to help relieve discomfort.?     It is important that you take?all of?your prescribed medication even if your symptoms are improving after a few doses.? Taking?all of?your medicine helps prevent the symptoms from returning.?     If your symptoms worsen, you develop severe headache, vomiting, high fever (>102), or are not improving in 7 days, please contact your primary care provider for an appointment or visit any of our convenient Walk-in Care or Urgent Care Centers to be seen which can be found on our website?here.?     Thanks again for choosing?us?as your health care partner,?   ?  Escobar Perry PA-C?

## 2022-01-08 ENCOUNTER — HEALTH MAINTENANCE LETTER (OUTPATIENT)
Age: 42
End: 2022-01-08

## 2022-02-10 ENCOUNTER — MYC MEDICAL ADVICE (OUTPATIENT)
Dept: FAMILY MEDICINE | Facility: CLINIC | Age: 42
End: 2022-02-10
Payer: COMMERCIAL

## 2022-02-10 DIAGNOSIS — I10 ESSENTIAL HYPERTENSION: ICD-10-CM

## 2022-02-11 RX ORDER — AMLODIPINE BESYLATE 10 MG/1
10 TABLET ORAL DAILY
Qty: 90 TABLET | Refills: 3 | Status: SHIPPED | OUTPATIENT
Start: 2022-02-11 | End: 2023-03-06

## 2022-02-11 RX ORDER — LISINOPRIL 40 MG/1
40 TABLET ORAL DAILY
Qty: 90 TABLET | Refills: 3 | Status: SHIPPED | OUTPATIENT
Start: 2022-02-11 | End: 2023-03-06

## 2022-04-08 DIAGNOSIS — E66.01 MORBID OBESITY (H): Primary | ICD-10-CM

## 2022-05-23 ENCOUNTER — OFFICE VISIT (OUTPATIENT)
Dept: FAMILY MEDICINE | Facility: CLINIC | Age: 42
End: 2022-05-23
Payer: COMMERCIAL

## 2022-05-23 VITALS
TEMPERATURE: 98.2 F | BODY MASS INDEX: 54.63 KG/M2 | HEART RATE: 76 BPM | RESPIRATION RATE: 20 BRPM | SYSTOLIC BLOOD PRESSURE: 128 MMHG | DIASTOLIC BLOOD PRESSURE: 80 MMHG | WEIGHT: 315 LBS | OXYGEN SATURATION: 99 %

## 2022-05-23 DIAGNOSIS — M17.11 PRIMARY OSTEOARTHRITIS OF RIGHT KNEE: Primary | ICD-10-CM

## 2022-05-23 PROCEDURE — 20610 DRAIN/INJ JOINT/BURSA W/O US: CPT | Mod: RT | Performed by: PHYSICIAN ASSISTANT

## 2022-05-23 RX ORDER — TRIAMCINOLONE ACETONIDE 40 MG/ML
80 INJECTION, SUSPENSION INTRA-ARTICULAR; INTRAMUSCULAR ONCE
Status: COMPLETED | OUTPATIENT
Start: 2022-05-23 | End: 2022-05-23

## 2022-05-23 RX ADMIN — TRIAMCINOLONE ACETONIDE 80 MG: 40 INJECTION, SUSPENSION INTRA-ARTICULAR; INTRAMUSCULAR at 16:42

## 2022-05-23 ASSESSMENT — PAIN SCALES - GENERAL: PAINLEVEL: MILD PAIN (2)

## 2022-05-23 NOTE — PROGRESS NOTES
"  Assessment & Plan     Primary osteoarthritis of right knee  A steroid injection was performed at right lateral knee using 1% plain Lidocaine and 80 mg of Kenalog. This was well tolerated.  - triamcinolone (KENALOG-40) injection 80 mg     BMI:   Estimated body mass index is 54.63 kg/m  as calculated from the following:    Height as of 7/29/21: 1.822 m (5' 11.75\").    Weight as of this encounter: 181.4 kg (400 lb).   Weight management plan: Discussed healthy diet and exercise guidelines    JACQUES Clifford [facility]    Baldo Quintana is a 42 year old who presents for the following health issues     Knee Pain    History of Present Illness       Reason for visit:  Cortizone shot in my knee    He eats 4 or more servings of fruits and vegetables daily.He consumes 1 sweetened beverage(s) daily.He exercises with enough effort to increase his heart rate 20 to 29 minutes per day.  He exercises with enough effort to increase his heart rate 5 days per week.   He is taking medications regularly.     Patient is a 42 year old male who presents today for repeat knee injection. He last received injection in the right knee in Nov 2021. 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 These changes resulted in an increase in snacking and a decrease in exercise. The patient recognizes that this has caused a ~25lb gain in weight and likely further exacerbation of his knee problems.     Review of Systems   Constitutional, HEENT, cardiovascular, pulmonary, gi and gu systems are negative, except as otherwise noted.      Objective    /80 (BP Location: Right arm, Patient Position: Chair, Cuff Size: Adult Large)   Pulse 76   Temp 98.2  F (36.8  C) (Temporal)   Resp 20   Wt (!) 181.4 kg (400 lb)   SpO2 99%   BMI 54.63 kg/m    Body mass index is 54.63 kg/m .  Physical Exam   GENERAL: healthy, alert and no distress  RESP: lungs clear to auscultation - no rales, " rhonchi or wheezes  CV: regular rate and rhythm, normal S1 S2, no S3 or S4, no murmur, click or rub, no peripheral edema and peripheral pulses strong  MS: no gross musculoskeletal defects noted, no edema  NEURO: Normal strength and tone, mentation intact and speech normal  PSYCH: mentation appears normal, affect normal/bright

## 2022-06-15 ENCOUNTER — VIRTUAL VISIT (OUTPATIENT)
Dept: SURGERY | Facility: CLINIC | Age: 42
End: 2022-06-15
Attending: PHYSICIAN ASSISTANT
Payer: COMMERCIAL

## 2022-06-15 VITALS — WEIGHT: 315 LBS | BODY MASS INDEX: 41.75 KG/M2 | HEIGHT: 73 IN

## 2022-06-15 DIAGNOSIS — E66.01 MORBID OBESITY (H): ICD-10-CM

## 2022-06-15 DIAGNOSIS — E66.01 CLASS 3 SEVERE OBESITY DUE TO EXCESS CALORIES WITH SERIOUS COMORBIDITY AND BODY MASS INDEX (BMI) OF 50.0 TO 59.9 IN ADULT (H): Primary | ICD-10-CM

## 2022-06-15 DIAGNOSIS — E66.813 CLASS 3 SEVERE OBESITY DUE TO EXCESS CALORIES WITH SERIOUS COMORBIDITY AND BODY MASS INDEX (BMI) OF 50.0 TO 59.9 IN ADULT (H): Primary | ICD-10-CM

## 2022-06-15 DIAGNOSIS — I10 ESSENTIAL HYPERTENSION: ICD-10-CM

## 2022-06-15 DIAGNOSIS — R06.83 SNORING: ICD-10-CM

## 2022-06-15 PROCEDURE — 99215 OFFICE O/P EST HI 40 MIN: CPT | Mod: 95 | Performed by: EMERGENCY MEDICINE

## 2022-06-15 PROCEDURE — 99417 PROLNG OP E/M EACH 15 MIN: CPT | Performed by: EMERGENCY MEDICINE

## 2022-06-15 NOTE — Clinical Note
New surg intro. Needs exam visit. Quit date will be needed, tapering of vaped nicotine, no cigarettes in  months. Sleep study referral sent as is high risk. If moderate or severe NORMA will need treatment if found on his PSG.  Demario Snider MD

## 2022-06-15 NOTE — PATIENT INSTRUCTIONS
Plan:  1. Read through the manual below.  2. Intake visits with dietician/psychology.  3. Intake labs can be done anytime.  4. Attend support group at least once.  See below for invite.  5. Anticipate actigall use for 6 months after surgery to reduce gallstone risks.  6. Referral for sleep study evaluation as you have high risk for NORMA, stopbang of at least 4 w/ witnessed apneic spells.snoring, hypertension, BMI over 50.  If sleep apnea is moderate or severe, cpap treatment should be initiated for a minimum of 6 weeks prior to your future surgery to reduce complication risks and improve your weight loss results.  7. 3 days weekly of strength work for 15 minutes or more will reduce muscle losses during weight loss.  8. In person visit for accurate weight/measurements. In our program, if BMI is over 60 or waist is over 60 inches, some preoperative weight loss may be required, often 10-15 lbs or more depending on excess.             Before being submitted for insurance approval, you will need the following:    -Clearance by the Psychologist  -Clearance by the dietitian  -Attend Support Group ([address] [place] [address] at [facility]) 2nd Tuesday of the month 6:30-8pm. Make sure to sign in.  -Routine Health Care Maintenance must be up to date (mammograms yearly after age 40, paps as recommended by your primary provider,  colonoscopy after age 50, earlier if high risk.  -If you are on estrogen, estrogen will be discontinued one month prior to surgery. It may be resumed one month after surgery unless otherwise advised to limit blood clotting risks.  -Pre Operative Lab work-ordered today.    -Structured weight loss visits IF mandated by your insurance carrier or bariatrician.  -Surgeon consult as we get nearer to potential surgery or sooner if you have special considerations that may make your surgery more complex.  -If you have sleep apnea you will need to be using CPAP for at least one month before surgery to reduce your risk of  breathing problems after surgery. Make sure to bring your CPAP or BiPAP to the hospital at the time of surgery.    -You will need to be tobacco free for 2 months before surgery and remain a non-smoker thereafter. If you are currently smoking or have recently quit, your urine will be evaluated for tobacco metabolites pre-operatively.  Smoking is a major risk factor for developing ulceration of your surgical sites and potential severe complications.    -If you are on insulin, you might be referred to an endocrinologist who will manage your insulin during the liquid diet and around the time of surgery. This endocrinologist does not replace your primary provider or your endocrinologist.   -You will need an exercise plan which includes MOVE, ie., walking and MUSCLE, ie.,calisthenics, bands, weight, machines, etc...Maintenance of long term weight loss and quality of life is much improved with regular exercise as the weight comes off.  ______________________________________________________________________    Remember that after your bariatric surgery, vitamin supplementation is a lifelong need.    You will take:    B-12 300-500mcg or higher sublingual (under the tongue) daily or by 1000mcg injection 1-2X/month  D3:  3000- 5000 IUs daily   Multivitamin containing 18mg of iron twice a day  Calcium citrate 1 or 2 daily    To keep your weight off and your vitamin levels up, follow-up is important.    Your labs will be monitored every 6 months for the first two years (every 3 months if you had a duodenal switch) and yearly thereafter.    To avoid ulcers in your stomach avoid tobacco forever, alcohol in excess, caffeine in excess and anti-inflammatories (NSAIDS)  (Aspirin, Ibuprofen, Naproxen and similar medications). Tylenol is fine at usual doses on the box.    If you are told by your physician take Aspirin to protect your heart or for another reason, make sure to take omeprazole or similar medication (protonix, nexium,  prevacid) to protect your stomach.    Remember that alcohol affects you differently after bariatric surgery. If you have even ONE drink DO NOT DRIVE due to rapid absorption and fast spiking of blood alcohol levels within minutes of consumption.     Slowly Increasing your exercise capacity such that 3-6 months after your surgery you're tolerating 150-250 minutes of exercise weekly will help optimize your metabolism and improve the chance of good weight loss maintenance.              Welcome to [facility]!      We are grateful that you have chosen us to partner with you on your journey to better health!  We have included some very important information for you to read as you begin your journey towards weight loss surgery. We will discuss parts of this as you move closer to surgery but please reach out if you have any questions or concerns.      Please click on the following links and they will lead you to a printable version of each handout or copy into your browser to view/print at home:    Making Your Decision, Understanding Weight Loss Surgery  [email or web address]    A Roadmap to Your Weight Loss Surgery  [email or web address]    Honoring Choices - Your Rights  [email or web address]    Honoring Choices - [place] Health Care Directive (form that can be filled out)  [email or web address]      Insurance Coverage and Approval for Surgery  Every insurance plan is different.  Many companies approve benefits for surgery, but many have specific requirements that must be completed prior to being approved.    Verification of benefits is the patient's responsibility.  You will be responsible for any charges not covered by your insurance plan - including, but not limited to copays, deductible, out of pocket, any amount over your cap limit, etc.  Using the guideline below, please contact your insurance plan (we recommend you call the Customer Service  number on the back of your card).      Once all of the requirements for surgery are complete, you will see the surgeon.  Following this visit, we will submit your information to your insurance plan for Prior Authorization.  We strongly encourage you to submit any documentation that supports your weight loss attempts to us.    Questions that are important to ask your insurance company when you call them:    Are [facility] in my network?  Is bariatric surgery a covered benefit under my policy?  If so, what is my estimated out of pocket expense?  Are there any exclusions or cap limits on my plan for bariatric surgery?  If so, what are they?  What are the criteria necessary to be approved for bariatric surgery?  Do you require medically supervised weight loss visits for approval of surgery?  If yes, for how many months?  Within the last # of years?  Are the following procedures a covered benefit under my plan?  Laparoscopic Gastric Bypass (CPT Code 47983)  Laparoscopic Sleeve Gastrectomy (CPT Code 80593)  Nutrition/Dietitian Consult (CPT Code 25893  Nutrition/Dietitian Reassessment (CPT Code 58975  Psychological Assment (CPT Codes 26445 & 11997      Initial labs for Bariatric Surgery    Some lab orders were placed by your doctor in the [facility] system and can be drawn at any of our outpatient hospital labs or your clinic. If you do them at one of three hospitals ('s in [place], [facility] in [place], [facility] in [place]) you do not need an appointment but if you'd like to do them at a clinic, you will need to schedule an appointment with that clinic.       You will need to be fasting 10-12 hours prior (you can continue to drink water) and should have them drawn sooner verses later so if any corrections need to be made we will have time to address them.      [facility] lab is open 7 am - 5:30 pm Monday through Friday and 9am-12:30 pm on Saturdays.  [facility] is  open 7 am -6:30 pm Monday through Friday.     If you would like them done at a clinic outside the [facility] system, then we will need to know where to fax the orders.   If you have any questions or would like help scheduling a lab appointment at the [facility], please give us a call on the Bariatric Nurse Line at [phone].        [place] [facility] for Bariatric Surgery  Eat 3 nutrient-rich meals each day     Don't skip meals--it will cause you to overeat later in the day! Space meals 4-6 hours apart    Eat around the same times each day to develop a routine eating schedule    Avoid snacking unless physically hungry.   Planned snacks: 1-2 times per day and no more than 150 calories    Eating protein and fiber (vegetables/fruits/whole grains) with meals helps you stay full     Choose foods with less than 10 grams of sugar and 5-10 grams of fat per serving to prevent excess calories and weight gain  Eat protein first    Protein helps with healing, maintaining muscle mass and good nutrition, and reducing hunger     For RNY Gastric Bypass, Sleeve Gastrectomy, and Duodenal Switch: aim for 60-80 grams of protein per day    Eat protein first, then veggies and the fruits or grains  Stop drinking 15-30 minutes before meals and wait 30-45 minutes after eating to drink    Drinking too soon before a meal may cause you to be too full to eat    Drinking too soon after you eat will cause the food to  wash  through your new stomach too quickly--leaving you hungry and likely to eat more    Eat S-[id number]    Take 20-30 minutes to eat each meal by taking small bites, chewing foods to applesauce consistency or 20-30 times before you swallow    Not chewing food properly can block the opening of your pouch--causing pain, nausea, vomiting    Eating foods too fast can delay those full signals and increase overeating   Slow down your eating by smaller plates/bowls, toddler utensils, putting your fork/spoon down  between bites and not watching TV/computer during meals!  Make a list of activities to prevent boredom, stress and emotional eating    Go for a walk or lift weights while watching your favorite TV show    Talk to a co-worker or email/call a friend     Keep your hands and mind busy with woodworking, puzzles, knitting or painting your nails    Practice deep breathing or meditation  Drink 64 ounces of 0-Calorie drinks between meals     Water    Zero calorie Propel , Vitamin Water Zero  or [employer] , Crystal Light , Sugar-Free Scott[employer] , and other sugar-free lemonade or flavored mckeon    Decaffeinated, unsweetened coffee/ tea (1 cup or less per day)   Avoid Caffeine and Carbonation- NO STRAWS    Caffeine can irritate your new pouch, increase risk for ulcers, and can increase your appetite      Carbonation can lead to increased bloating/gas and discomfort   Work up to a total of 30-60 minutes of physical activity most days of the week    Helps with losing weight and preventing weight regain after surgery     Do a combination of cardio (walking/water exercises/biking/swimming) and strength training  Take daily vitamin/mineral supplements after surgery    Daily use of vitamins/minerals is necessary for the rest of your life      Psychological Evaluation for Bariatric Surgery      Why do I need a psychological evaluation before bariatric surgery?     The psychologist's main purpose is to provide the support and education to ensure you have the most successful outcome after surgery.   Preparing for bariatric surgery often involves changing behavior patterns. Working on these changes before surgery allows you to achieve the best results after surgery as some of these behaviors have been entrenched for many years. In addition, your relationship with food may need to change. Psychologists are experts in behavior change and are there to guide and support you as you make these important changes.   A significant life  change, like losing a lot of weight, may affect many areas of your life--self-concept, physical activity and relationships with others. Your psychologist will help you prepare to adjust to these changes in a healthy way.   If you have mental health symptoms, relationship struggles, or other challenges, your psychologist will suggest how to best address these concerns so that they do not interfere with your progress toward a healthier lifestyle.       Is the psychologist looking for reasons to say I can't have surgery?   The goal is to not find specific psychological problems. Instead, the psychologist will be working with your strengths to ensure you have the most optimal outcome after surgery.  There is no expectation that you will have everything figured out already or that you must have  perfect  behaviors before moving forward with surgery. Your psychologist is expecting that you will have some behavior changes that will need to occur concurrent with the surgery.   You can feel comfortable that the psychologist is not there to    you or your habits. The psychologist is there to provide support and encourage your progress.      What should I expect during the evaluation?   During the interview, which could take place over 2 or more sessions, the psychologist will ask about:   -weight history, current eating pattern and fluid intake, and previous attempts at weight loss   -activity level   -psychiatric history  -drug, alcohol and nicotine use   -social and developmental history  -support system   -current stressors and coping mechanisms   -understanding of the risks of surgery   -expectations for outcomes after surgery   You will complete various questionnaires that will focus on coping strategies, eating behaviors, quality of life and adverse childhood experiences in order to provide additional information to inform the assessment.   Your psychologist will likely give you some  homework assignments  to  practice as you prepare for surgery. This will be individually tailored to your specific needs.   Your psychologist will talk with you about some of the typical challenges that patients might encounter after surgery and how to prepare for these.   A final report will be generated and any treatment recommendations will be discussed with you and the bariatric team to determine next steps.   If you would like, you may have any support people (e.g., spouse) join a session of the evaluation to provide additional information.       Bariatric surgery offers a physical  tool  for weight management. Similarly, your work with the psychologist will provide you with some additional emotional and behavioral  tools  as you work toward your healthier lifestyle goals.      Support Group    Support and accountability is an important part of your weight loss journey and maintenance once you reach your health goals.  Because of this, we require that you attend at least one of our support groups before you meet with the surgeon so you get a feel for what they are like and hopefully establish a connection to others who are going through a similar process or have had surgery before so you can ask your questions.    We currently have two options for support group but they are in the virtual format only at this time.  Both groups are using Microsoft Teams for their platform and you can access it through the web or an reese that can be downloaded to a smart phone if you have one.      The Pre- and Post-op Connections Group is on the second Tuesday of the month from 6:30-8pm and is hosted by Harley Pinto, PhD.  If you are interested in this group, you will need to email him at [email or web address] each month and then he will in turn send you the invitation to join.      We also have a Post-op focused Connections Group the 4th Wednesday of the month from 11am-12pm that is mostly geared toward post-operative patients who are past three  "months post-op.  This group is hosted by Gila Aguirre, BELTRANN, CBN, CIC and you can email her to join the group at [email or web address] each month and she will send you an invite similar to Harley's group.  If you decide you would like to be a regular attender at this group, we can add you to an automatic invitation list of people.    You can see more information about available support groups that the [facility] offers to our patients as well by following the link:    The following Support Group information can also be found on our website:  [email or web address]      Please let us know if you have any questions about all the information above and we will be talking more about this during future visits.     Thank you,   [facility] Team     Bariatric Task List    Fax:  Please fax all paperwork to: [phone] -     Status:  Is patient a candidate for bariatric surgery?:  patient is a candidate for bariatric surgery -     Cleared to schedule surgeon consult?:  not cleared to schedule surgeon consult -     Status:  surgery evaluation in process -     Surgeon: Ping Sawant -        Insurance: Insurance:  [employer] -      Contact insurance to discuss coverage: Needed -       Other:  Call Malathi at [phone] to discuss insurance requirements. -        Patient Info: Initial Weight:  400#, 6'1\" -     Date of Initial Weight/Height:  6/15/2022 -     Required Weight Loss:  To be determined at in-person exam visit. -     Surgery Type:  sleeve gastrectomy -     Multidisciplinary Meeting:  Needed -        Dietician Visits: Structured weight loss required by insurance?:  no structured weight loss required -     Dietician Visit 1:  Needed -     Dietician Visit 2:  Needed -     Dietician Visit 3:  Needed -     Clearance from dietician to see surgeon?:  Needed - Shruti      Psychological Evaluation: Psych eval:  Needed - Harley      Lab Work: " "Complete Blood Count:  Needed -     Comprehensive Metabolic Panel:  Needed -     Vitamin D:  Needed -     PTH:  Needed -     Hgb A1c:  Needed -      Lipids: Needed -      TSH ([facility], [facility], [place]): Needed -       Ferritin: Needed -       Folate: Needed -     Thiamine: Needed -     Vitamin A: Needed -     Vitamin B12: Needed -     Zinc: Needed -     Nicotine Testing:  Needed - 3 mos after quit date.   Other:  Needed - Initial labs ordered-let nurses know if you need help getting these done.      Consults/ Clearance Sleep Medicine:  Needed - Referral placed, call [phone] if you don't hear from scheduling.   Physical Therapy/Exercise:  Needed - 3 days weekly of strength work for 15 minor more will reduce muscle losses during weight loss.      Testing: Sleep Study: Needed -        Stopping Smoking/ Alcohol Use: Quit tobacco use (3 months smoke free)?:  Needed - vapes, let us know your quit date.   Quit date:    -        Patient Education:  Information Session:  Completed - Week of 06/13/2022   Given \"Making your decision\" handout?:  Yes -     Given \"A Roadmap to you Weight Loss Surgery\" handout?: Yes -     Given support group information?:   Send email to [email or web address] to request invite to next meeting.   Attended support group?:  Needed - Must attend at least once!!      Additional Surgery Requirements: Review Coag plan:  Completed - standard   Final nicotine screen:  Needed - w/pre-op labs   Gallstone prevention plan (Actigall for 6 months postop): Needed -        Final Tasks:  Before surgery online class:  Needed - Prior to surgery   After surgery online class:  Needed - I week after surgery w/dietitian   Nurse visit per clinic:  Needed - In-person w/--scheduled 9/9/22   History and Physical per clinic:   Needed - within 30 days of surgery   Final labs per clinic: Needed - within 30 days of surgery   Chest xray per clinic:  Needed - within 90 days of surgery   Electrocardiogram (ECG) per " clinic:  Needed - within 90 days of surgery      Notes: In person visit for accurate weight/measurements. In our program, if BMI is over 60 or waist is over 60 inches, some preoperative weight loss may be required, often 10-15 lbs or more depending on excess.  -

## 2022-06-15 NOTE — LETTER
"    6/15/2022         RE: Dontrell Schulz  [address] [facility] [address]        Dear Colleague,    Thank you for referring your patient, Dontrell Schulz, to the [facility]ATR[facility]. Please see a copy of my visit note below.    Dontrell Schulz is 42 year old  male who presents for a billable video visit today.    How would you like to obtain your AVS? MyChart  If dropped from the video visit, the video invitation should be resent by: Send to e-mail at: [email or web address]  Will anyone else be joining your video visit? No      Video Start Time: 8:15 am 8:07am    Are there any specific questions or needs that you would like addressed at your visit today?     Initial Surgical Consult with Bariatrician.    70 minutes spent reviewing options, surgical/non surgical and interview/note taking/chart review.    New Bariatric Surgery Consultation Note    Darlene 15, 2022    RE: Dontrell Schulz  MR#: [id number]  : 1980      Referring provider: No flowsheet data found.    Chief Complaint/Reason for visit: evaluation for possible weight loss surgery    Dear Escobar Perry PA-C (General),    I had the pleasure of seeing your patient, Dontrell Schulz, to evaluate his obesity and consider him for possible weight loss surgery. As you know, Dontrell Schulz is 42 year old.  He has a height of 6' 1\", a weight of 400 lbs 0 oz, and calculated Body mass index is 52.77 kg/m ..  Today we discussed our Bariatric Surgery program to address their weight related health. he has  viewed our online seminar prior to this visit and on discussion today, has decided to to pursue the surgical program. He's been weaning down on nicotine the last year, now on 3% vape and ready to quit. We reviewed the importance of lifelong nicotine abstinence to be a surgical candidate and limit complications for ulcers/gastritis and esophageal issues.    Factors that could affect the success/risk " of their bariatric surgery include:  Nicotine user, ready to quit but still vaping.  Long hours. Long commute.  Likely untreated sleep apnea based on risks.      Assessment & Plan   Problem List Items Addressed This Visit        Digestive    Morbid obesity -- BMI 45.5       Circulatory    Essential hypertension    Relevant Orders    Adult Sleep Eval & Management  Referral      Other Visit Diagnoses     Class 3 severe obesity due to excess calories with serious comorbidity and body mass index (BMI) of 50.0 to 59.9 in adult (H)    -  Primary    Relevant Orders    CBC with platelets    Comprehensive metabolic panel    Ferritin    Folate    Hemoglobin A1c    Lipid Profile    Parathyroid Hormone Intact    TSH    Vitamin A    Vitamin B1 whole blood    Vitamin B12    25- OH-Vitamin D    Zinc    Magnesium    Adult Sleep Eval & Management  Referral    Snoring        Relevant Orders    Adult Sleep Eval & Management  Referral         Plan:  1. Read through the manual below.  2. Intake visits with dietician/psychology.  3. Intake labs can be done anytime.  4. Attend support group at least once.  See below for invite.  5. Anticipate actigall use for 6 months after surgery to reduce gallstone risks.  6. Referral for sleep study evaluation as you have high risk for NORMA, stopbang of at least 4 w/ witnessed apneic spells.snoring, hypertension, BMI over 50.  If sleep apnea is moderate or severe, cpap treatment should be initiated for a minimum of 6 weeks prior to your future surgery to reduce complication risks and improve your weight loss results.  7. 3 days weekly of strength work for 15 minutes or more will reduce muscle losses during weight loss.  8. In person visit for accurate weight/measurements. In our program, if BMI is over 60 or waist is over 60 inches, some preoperative weight loss may be required, often 10-15 lbs or more depending on excess.       HISTORY OF PRESENT ILLNESS:  No flowsheet data  "found.    CO-MORBIDITIES OF OBESITY INCLUDE:  No flowsheet data found.    PAST MEDICAL HISTORY:  Past Medical History:   Diagnosis Date     Hypertension        PAST SURGICAL HISTORY:  Past Surgical History:   Procedure Laterality Date     EYE SURGERY  2000    trauma related - Retinal injury       FAMILY HISTORY:   No family history on file.    SOCIAL HISTORY:   No flowsheet data found.  See below. . Wife present with him on phone call today.  HABITS:  No flowsheet data found.    Down to 3% nicotine vapes   PSYCHOLOGICAL HISTORY:   No flowsheet data found.  No major  Mental health issues.  No addictive issues in the past other than nicotine. Open to quitting  ROS:some NORMA sx w/ snoring and occ witness apneas by his wife.  No flowsheet data found.  No fainting hx  No chest pains/dyspnea.   No skin rashes.  EATING BEHAVIORS:  No flowsheet data found.  Currently eats 3 meals:  Variable at work, one meal when gets up, snack mid morning, currently carrots. Lunch at work is packed. and dinner when gets home  Gets up at 3am, working from 5am to 5pm. Commutes 1 hour each way. May have a month off yearly in December.  EXERCISE:  Hunting/fishing, has cattle  Work is at a plant. Working in quality control, carrying cylinders of concrete.  No flowsheet data found.    MEDICATIONS:  Current Outpatient Medications   Medication Sig Dispense Refill     amLODIPine (NORVASC) 10 MG tablet Take 1 tablet (10 mg) by mouth daily 90 tablet 3     lisinopril (ZESTRIL) 40 MG tablet Take 1 tablet (40 mg) by mouth daily 90 tablet 3     meclizine (ANTIVERT) 25 MG tablet Take 1 tablet (25 mg) by mouth 3 times daily as needed for dizziness 30 tablet 0     order for DME Thigh High HEIDI stockings 1 each 1     Down from smoking 2 ppd down to occ vape now. Off cigarettes for the last  Months.  ALLERGIES:  Allergies   Allergen Reactions     Nkda [No Known Drug Allergies]            PHYSICAL EXAM:  Objective    Ht 1.854 m (6' 1\")   Wt (!) 181.4 kg " "(400 lb)   BMI 52.77 kg/m    Vitals - Patient Reported  Weight (Patient Reported): 181.4 kg (400 lb)  Height (Patient Reported): 185.4 cm (6' 1\")  BMI (Based on Pt Reported Ht/Wt): 52.77    Normal facies but switched to phone due to wife holding phone of him. No cough/dyspnea. Normal mentation, but somewhat tired voice.    Physical Exam  Constitutional:       Appearance: He is obese.   Pulmonary:      Effort: Pulmonary effort is normal.   Neurological:      General: No focal deficit present.      Mental Status: He is alert.   Psychiatric:         Attention and Perception: Attention normal.         Mood and Affect: Affect normal.         Behavior: Behavior is cooperative.         Thought Content: Thought content normal.         Cognition and Memory: Cognition normal.              In summary, Dontrell Schulz has Class III obesity with a body mass index of Body mass index is 52.77 kg/m . kg/m2 and the comorbidities stated above. He completed an informational seminar and is a possible candidate for the laparoscopic gastric sleeve.  He will have to complete the following pre-requisites:    We'll see on his in person visit how he holds his weight and if preoperative weight loss is needed.    Today in the office we discussed gastric sleeve surgery. Preoperative, perioperative, and postoperative processes, management, and follow up were addressed.  Risks and benefits were outlined including the risk of death, staple line leak (1-2%), PE, DVT, ulcer, worsening GERD, N/V, stricture, hernia, wound infection, weight regain, and vitamin deficiencies. I emphasized exercise and activity along with appropriate food choice as the main foundation for weight loss with surgery providing surgical reinforcement of this.  All questions were answered.  A goal sheet and support group handout were given to the patient. . We've covered the basics of bariatric method required for the optimization of their weight related health.  We've " discussed how surgery will affect their future nutritional needs; including the general nutritional approach, vitamin supplementation and which chemical/medication irritants we wish to avoid following surgery to reduce the risk of complications.  We expect to be an integral part of their on going health care following surgery and long term follow up is the expectation should it relate to their nutritional/supplement needs or bariatric anatomy. Once two years out from surgery, annual visits are the current recommendation to help maintenance of optimal weight related/bariatric health.      Once the patient has completed the requirements in their task list and there are no further recommendations, the pt will be allowed to see the surgeon of her choice for consultation on the laparoscopic gastric sleeve surgery. Patient verbalizes understanding of the process to surgery and expectations for the postoperative period including the need for lifelong lifestyle changes, vitamin supplementation, and laboratory monitoring. For patients capable of child bearing, we discussed taking precautions to avoid pregnancy for at least the first 18 months following their procedure.    Sincerely,     Demario Snider MD          Video-Visit Details    Type of service:  Video Visit    Video End Time (time video stopped): 9:13 AM  Originating Location (pt. Location): Home    Distant Location (provider location):  [facility] SURGERY CLINIC AND BARIATRICS CARE [place]     Platform used for Video Visit: Well        Again, thank you for allowing me to participate in the care of your patient.        Sincerely,        Demario Snider MD

## 2022-06-15 NOTE — PROGRESS NOTES
"Dontrell Schulz is 42 year old  male who presents for a billable video visit today.    How would you like to obtain your AVS? MyChart  If dropped from the video visit, the video invitation should be resent by: Send to e-mail at: [email or web address]  Will anyone else be joining your video visit? No      Video Start Time: 8:15 am 8:07am    Are there any specific questions or needs that you would like addressed at your visit today?     Initial Surgical Consult with Bariatrician.    70 minutes spent reviewing options, surgical/non surgical and interview/note taking/chart review.    New Bariatric Surgery Consultation Note    Darlene 15, 2022    RE: Dontrell Schulz  MR#: [id number]  : 1980      Referring provider: No flowsheet data found.    Chief Complaint/Reason for visit: evaluation for possible weight loss surgery    Dear Escobar Perry PA-C (General),    I had the pleasure of seeing your patient, Dontrell Schulz, to evaluate his obesity and consider him for possible weight loss surgery. As you know, Dontrell Schulz is 42 year old.  He has a height of 6' 1\", a weight of 400 lbs 0 oz, and calculated Body mass index is 52.77 kg/m ..  Today we discussed our Bariatric Surgery program to address their weight related health. he has  viewed our online seminar prior to this visit and on discussion today, has decided to to pursue the surgical program. He's been weaning down on nicotine the last year, now on 3% vape and ready to quit. We reviewed the importance of lifelong nicotine abstinence to be a surgical candidate and limit complications for ulcers/gastritis and esophageal issues.    Factors that could affect the success/risk of their bariatric surgery include:  Nicotine user, ready to quit but still vaping.  Long hours. Long commute.  Likely untreated sleep apnea based on risks.      Assessment & Plan   Problem List Items Addressed This Visit        Digestive    Morbid obesity -- BMI 45.5       " Circulatory    Essential hypertension    Relevant Orders    Adult Sleep Eval & Management  Referral      Other Visit Diagnoses     Class 3 severe obesity due to excess calories with serious comorbidity and body mass index (BMI) of 50.0 to 59.9 in adult (H)    -  Primary    Relevant Orders    CBC with platelets    Comprehensive metabolic panel    Ferritin    Folate    Hemoglobin A1c    Lipid Profile    Parathyroid Hormone Intact    TSH    Vitamin A    Vitamin B1 whole blood    Vitamin B12    25- OH-Vitamin D    Zinc    Magnesium    Adult Sleep Eval & Management  Referral    Snoring        Relevant Orders    Adult Sleep Eval & Management  Referral         Plan:  1. Read through the manual below.  2. Intake visits with dietician/psychology.  3. Intake labs can be done anytime.  4. Attend support group at least once.  See below for invite.  5. Anticipate actigall use for 6 months after surgery to reduce gallstone risks.  6. Referral for sleep study evaluation as you have high risk for NORMA, stopbang of at least 4 w/ witnessed apneic spells.snoring, hypertension, BMI over 50.  If sleep apnea is moderate or severe, cpap treatment should be initiated for a minimum of 6 weeks prior to your future surgery to reduce complication risks and improve your weight loss results.  7. 3 days weekly of strength work for 15 minutes or more will reduce muscle losses during weight loss.  8. In person visit for accurate weight/measurements. In our program, if BMI is over 60 or waist is over 60 inches, some preoperative weight loss may be required, often 10-15 lbs or more depending on excess.       HISTORY OF PRESENT ILLNESS:  No flowsheet data found.    CO-MORBIDITIES OF OBESITY INCLUDE:  No flowsheet data found.    PAST MEDICAL HISTORY:  Past Medical History:   Diagnosis Date     Hypertension        PAST SURGICAL HISTORY:  Past Surgical History:   Procedure Laterality Date     EYE SURGERY  2000    trauma related  "- Retinal injury       FAMILY HISTORY:   No family history on file.    SOCIAL HISTORY:   No flowsheet data found.  See below. . Wife present with him on phone call today.  HABITS:  No flowsheet data found.    Down to 3% nicotine vapes   PSYCHOLOGICAL HISTORY:   No flowsheet data found.  No major  Mental health issues.  No addictive issues in the past other than nicotine. Open to quitting  ROS:some NORMA sx w/ snoring and occ witness apneas by his wife.  No flowsheet data found.  No fainting hx  No chest pains/dyspnea.   No skin rashes.  EATING BEHAVIORS:  No flowsheet data found.  Currently eats 3 meals:  Variable at work, one meal when gets up, snack mid morning, currently carrots. Lunch at work is packed. and dinner when gets home  Gets up at 3am, working from 5am to 5pm. Commutes 1 hour each way. May have a month off yearly in December.  EXERCISE:  Hunting/fishing, has cattle  Work is at a plant. Working in quality control, carrying cylinders of concrete.  No flowsheet data found.    MEDICATIONS:  Current Outpatient Medications   Medication Sig Dispense Refill     amLODIPine (NORVASC) 10 MG tablet Take 1 tablet (10 mg) by mouth daily 90 tablet 3     lisinopril (ZESTRIL) 40 MG tablet Take 1 tablet (40 mg) by mouth daily 90 tablet 3     meclizine (ANTIVERT) 25 MG tablet Take 1 tablet (25 mg) by mouth 3 times daily as needed for dizziness 30 tablet 0     order for DME Thigh High HEIDI stockings 1 each 1     Down from smoking 2 ppd down to occ vape now. Off cigarettes for the last  Months.  ALLERGIES:  Allergies   Allergen Reactions     Nkda [No Known Drug Allergies]            PHYSICAL EXAM:  Objective    Ht 1.854 m (6' 1\")   Wt (!) 181.4 kg (400 lb)   BMI 52.77 kg/m    Vitals - Patient Reported  Weight (Patient Reported): 181.4 kg (400 lb)  Height (Patient Reported): 185.4 cm (6' 1\")  BMI (Based on Pt Reported Ht/Wt): 52.77    Normal facies but switched to phone due to wife holding phone of him. No " cough/dyspnea. Normal mentation, but somewhat tired voice.    Physical Exam  Constitutional:       Appearance: He is obese.   Pulmonary:      Effort: Pulmonary effort is normal.   Neurological:      General: No focal deficit present.      Mental Status: He is alert.   Psychiatric:         Attention and Perception: Attention normal.         Mood and Affect: Affect normal.         Behavior: Behavior is cooperative.         Thought Content: Thought content normal.         Cognition and Memory: Cognition normal.              In summary, Dontrell Schulz has Class III obesity with a body mass index of Body mass index is 52.77 kg/m . kg/m2 and the comorbidities stated above. He completed an informational seminar and is a possible candidate for the laparoscopic gastric sleeve.  He will have to complete the following pre-requisites:    We'll see on his in person visit how he holds his weight and if preoperative weight loss is needed.    Today in the office we discussed gastric sleeve surgery. Preoperative, perioperative, and postoperative processes, management, and follow up were addressed.  Risks and benefits were outlined including the risk of death, staple line leak (1-2%), PE, DVT, ulcer, worsening GERD, N/V, stricture, hernia, wound infection, weight regain, and vitamin deficiencies. I emphasized exercise and activity along with appropriate food choice as the main foundation for weight loss with surgery providing surgical reinforcement of this.  All questions were answered.  A goal sheet and support group handout were given to the patient. . We've covered the basics of bariatric method required for the optimization of their weight related health.  We've discussed how surgery will affect their future nutritional needs; including the general nutritional approach, vitamin supplementation and which chemical/medication irritants we wish to avoid following surgery to reduce the risk of complications.  We expect to be an  integral part of their on going health care following surgery and long term follow up is the expectation should it relate to their nutritional/supplement needs or bariatric anatomy. Once two years out from surgery, annual visits are the current recommendation to help maintenance of optimal weight related/bariatric health.      Once the patient has completed the requirements in their task list and there are no further recommendations, the pt will be allowed to see the surgeon of her choice for consultation on the laparoscopic gastric sleeve surgery. Patient verbalizes understanding of the process to surgery and expectations for the postoperative period including the need for lifelong lifestyle changes, vitamin supplementation, and laboratory monitoring. For patients capable of child bearing, we discussed taking precautions to avoid pregnancy for at least the first 18 months following their procedure.    Sincerely,     Demario Snider MD          Video-Visit Details    Type of service:  Video Visit    Video End Time (time video stopped): 9:13 AM  Originating Location (pt. Location): Home    Distant Location (provider location):  [facility] SURGERY CLINIC AND BARIATRICS CARE [place]     Platform used for Video Visit: [employer]

## 2022-06-27 DIAGNOSIS — I10 ESSENTIAL HYPERTENSION: ICD-10-CM

## 2022-06-27 DIAGNOSIS — E66.813 CLASS 3 SEVERE OBESITY DUE TO EXCESS CALORIES WITH SERIOUS COMORBIDITY AND BODY MASS INDEX (BMI) OF 50.0 TO 59.9 IN ADULT (H): Primary | ICD-10-CM

## 2022-06-27 DIAGNOSIS — E66.01 CLASS 3 SEVERE OBESITY DUE TO EXCESS CALORIES WITH SERIOUS COMORBIDITY AND BODY MASS INDEX (BMI) OF 50.0 TO 59.9 IN ADULT (H): Primary | ICD-10-CM

## 2022-06-27 RX ORDER — LIRAGLUTIDE 6 MG/ML
INJECTION, SOLUTION SUBCUTANEOUS
Qty: 3 ML | Refills: 5 | Status: SHIPPED | OUTPATIENT
Start: 2022-06-27 | End: 2023-03-06 | Stop reason: SINTOL

## 2022-06-27 NOTE — PROGRESS NOTES
Will see if saxenda covered for preoperative weight loss assistance in light of super morbid obesity, BMI of 52 w/ hypertension.  To whom it may concern,  I am writing this letter on behalf of my patient, Dontrell Schulz to express a concern. My patient is in need of a medication called  Saxenda  that is currently not covered by your insurance plan.  Saxenda  was approved by the FDA on December 23, 2014, as an adjunct to a reduced-calorie diet and increased physical activity  for chronic weight management in adult patients with an initial body mass index (BMI) of 30 kg/m2 or greater (obese), or in adults  with a BMI of 27 kg/m2 or greater (overweight) in the presence of at least one weight-related comorbid condition (eg, hypertension,  type 2 diabetes mellitus, or dyslipidemia).  It is well recognized that obesity is a chronic illness associated with many related diseases, such as dyslipidemia and hypertension.  Obesity deserves the same treatment and attention as any other chronic illness. Please contact your health plan or pharmacy benefit  manager to pursue coverage for either this individual employee or for the company at large.    Thank you for being part of the solution for the obesity epidemic in our country and worldwide. In combination with diet change/behavior and exercise, medication supported weight loss plays an important role in reducing the risk of cancer, cerebral and coronary heart disease, liver disease, arthritis progression and dementia.  I thank you for considering my patient for coverage of Saxenda.     Kind Regards,  Demario Snider MD  [facility]  [address] [place] [id number]

## 2022-06-30 ENCOUNTER — TELEPHONE (OUTPATIENT)
Dept: SURGERY | Facility: CLINIC | Age: 42
End: 2022-06-30

## 2022-06-30 NOTE — TELEPHONE ENCOUNTER
PRIOR AUTHORIZATION DENIED    Medication: Saxenda 18mg/3ml Pen-injectors PA INITIATED 06/30/2022 PA DENIED 06/30/2022    Denial Date: 6/30/2022    Denial Rational:         Appeal Information:

## 2022-06-30 NOTE — TELEPHONE ENCOUNTER
Prior Authorization Retail Medication Request    Medication/Dose: Saxenda 18mg/3ml Pen-injectors    Key: [id number]    Pharmacy Information (if different than what is on RX)  Name:  [facility]   Phone:  [phone]

## 2022-06-30 NOTE — TELEPHONE ENCOUNTER
PA Initiation    Medication: Saxenda 18mg/3ml Pen-injectors PA INITIATED 06/30/2022  Insurance Company: [employer] - Phone [phone] Fax [phone]  Pharmacy Filling the Rx: [id number] - [place] - [address] [place] [address]  Filling Pharmacy Phone: [phone]  Filling Pharmacy Fax:    Start Date: 6/30/2022    Central Prior Authorization Team   Phone: [phone]

## 2022-07-03 NOTE — TELEPHONE ENCOUNTER
This medication has been denied by the insurance. Medication for this diagnosis is excluded from member benefits. If you would like to appeal please write a formulary exception letter, and route to the pharmacy liaison pool to send to insurance along with the denial letter.   Thank you

## 2022-07-14 NOTE — TELEPHONE ENCOUNTER
Mariano and his wife have new insurance now below and he spoke to them and they told him the Saxenda would be covered with a PA.  Is it possible to submit this again through his new insurance?    They are under [employer] now.  Group # [id number]  ID# [id number]     RX Bin: [id number]  RX PCN: [id number]  Rx Group: [employer]

## 2022-07-20 ENCOUNTER — TELEPHONE (OUTPATIENT)
Dept: SURGERY | Facility: CLINIC | Age: 42
End: 2022-07-20

## 2022-07-20 NOTE — TELEPHONE ENCOUNTER
PA Initiation    Medication: PA Pending Saxenda (New Ins)  Insurance Company: [employer] - Phone [phone] Fax [phone]  Pharmacy Filling the Rx:    Filling Pharmacy Phone:    Filling Pharmacy Fax:    Start Date: 7/20/2022    Key: [id number]

## 2022-07-20 NOTE — TELEPHONE ENCOUNTER
Prior Authorization Approval    Authorization Effective Date: 7/1/2022  Authorization Expiration Date: 11/17/2022  Medication: PA Pending Saxenda (New Ins)  Approved Dose/Quantity: 9ml per 30 days  Reference #: Key: [id number]   Insurance Company: [employer] - Phone [phone] Fax [phone]  Expected CoPay:       CoPay Card Available:      Foundation Assistance Needed:    Which Pharmacy is filling the prescription (Not needed for infusion/clinic administered):    Pharmacy Notified: Yes  Patient Notified: Yes

## 2022-08-01 ENCOUNTER — VIRTUAL VISIT (OUTPATIENT)
Dept: SURGERY | Facility: CLINIC | Age: 42
End: 2022-08-01

## 2022-08-01 VITALS — WEIGHT: 315 LBS | HEIGHT: 73 IN | BODY MASS INDEX: 41.75 KG/M2

## 2022-08-01 DIAGNOSIS — I10 ESSENTIAL HYPERTENSION: ICD-10-CM

## 2022-08-01 DIAGNOSIS — E66.01 MORBID OBESITY WITH BMI OF 50.0-59.9, ADULT (H): Primary | ICD-10-CM

## 2022-08-01 PROCEDURE — 97802 MEDICAL NUTRITION INDIV IN: CPT | Mod: 95 | Performed by: DIETITIAN, REGISTERED

## 2022-08-01 NOTE — LETTER
8/1/2022         RE: Dontrell Schulz  [address] [facility] [address]        Dear Colleague,    Thank you for referring your patient, Dontrell Schulz, to the [facility] [place]. Please see a copy of my visit note below.    Dontrell Schulz is a 42 year old who is being evaluated via a billable video visit.      How would you like to obtain your AVS? MyChart  If the video visit is dropped, the invitation should be resent by: Send to e-mail at: [email or web address]  Will anyone else be joining your video visit? Yes: Wife, Brigitte.       Video Start Time: 5:07p      Medical Weight Loss Initial Diet Evaluation  Assessment:  Dontrell is presenting today for a new weight management nutrition consultation. Pt has had an initial appointment with Dr. Snider.  Weight loss medication: saxenda- not wanting to snack as much    Personal Goals: weight loss     Anthropometrics:    Pt's weight is 400 lbs 0 oz    BMI: Body mass index is 52.77 kg/m .   Ideal body weight: 79.9 kg (176 lb 2.4 oz)  Adjusted ideal body weight: 120.5 kg (265 lb 11 oz)  Estimated RMR ([place]-St Jeor equation):  2781 kcals x 1.2 (sedentary) = 3337 kcals (for weight maintenance)    Recommended Protein Intake: 105-140 grams of protein/day    Medical History:  Patient Active Problem List   Diagnosis     Lower extremity numbness     Morbid obesity -- BMI 45.5     Essential hypertension     Transverse myelitis (H)     Nutrition History:   Food allergies/intolerances/cultural or religous food customs: No   Weight loss history: 2016 lost about 100lb- walking and clean eating  Vitamins/Mineral Supplementation: none  -struggles with large portions    Dietary Recall:  Breakfast: oatmeal, 1 tbsp brown sugar and cinnamon  Lunch: pork chops, dinner roll, grapes  Dinner: pork chops, tri-tip steak, potatoes  Typical Snacks: homemade granola bar  Overnight eating: No  Eating out: rare, maybe twice a month    Beverages:  water throughout the day, diet coke, Mt Dew zero- starts work at 5:30a, wakes at 3:30a    Exercise: biking 5-6 miles most days, yard work    Nutrition Diagnosis (PES statement):   Overweight/Obesity (NC 3.3) related to overeating and poor lifestyle habits as evidenced by patient report of large portions, lack of activity and BMI 52.77      Nutrition Intervention  1. Food and/or Nutrient Delivery   a. Placed emphasis on importance of developing a healthy meal routine, aiming for 3 meals a day and no snacks.  2. Nutrition Education   a. Discussed with patient how to build a meal: the importance of including a lean/low fat protein at each meal, include a source of vegetables at a minimum of lunch and dinner and limiting carbohydrate intake  b. Educated on sources of lean protein, portion sizes, the amount of grams found in each source. Recommend patient to aim for 20-30g protein at each meal.  c. Discussed the importance of adequate hydration, with emphasis on drinking 64oz of water or zero calorie beverages per day.  3. Nutrition Counseling   a. Encouraged importance of developing routine exercise for health benefits and weight loss.    Goals established by patient:   1. Protein at breakfast  2. Add afternoon snack    Handouts provided:  Keys to success for weight management    Assessment/Plan:    Pt will follow up in 1 month(s) with bariatrician and 2 month(s) with dietitian.       Video-Visit Details    Type of service:  Video Visit    Video End Time:5:35p    Originating Location (pt. Location): Home    Distant Location (provider location):  [facility]ATRICS [facility] [place]     Platform used for Video Visit: Darron PEÑA RD        Again, thank you for allowing me to participate in the care of your patient.        Sincerely,        POOJA PEÑA RD

## 2022-08-01 NOTE — PROGRESS NOTES
Dontrell Schulz is a 42 year old who is being evaluated via a billable video visit.      How would you like to obtain your AVS? MyChart  If the video visit is dropped, the invitation should be resent by: Send to e-mail at: [email or web address]  Will anyone else be joining your video visit? Yes: Wife, Brigitte.       Video Start Time: 5:07p      Medical Weight Loss Initial Diet Evaluation  Assessment:  Dontrell is presenting today for a new weight management nutrition consultation. Pt has had an initial appointment with Dr. Snider.  Weight loss medication: saxenda- not wanting to snack as much    Personal Goals: weight loss     Anthropometrics:    Pt's weight is 400 lbs 0 oz    BMI: Body mass index is 52.77 kg/m .   Ideal body weight: 79.9 kg (176 lb 2.4 oz)  Adjusted ideal body weight: 120.5 kg (265 lb 11 oz)  Estimated RMR ([place]-St Jeor equation):  2781 kcals x 1.2 (sedentary) = 3337 kcals (for weight maintenance)    Recommended Protein Intake: 105-140 grams of protein/day    Medical History:  Patient Active Problem List   Diagnosis     Lower extremity numbness     Morbid obesity -- BMI 45.5     Essential hypertension     Transverse myelitis (H)     Nutrition History:   Food allergies/intolerances/cultural or religous food customs: No   Weight loss history: 2016 lost about 100lb- walking and clean eating  Vitamins/Mineral Supplementation: none  -struggles with large portions    Dietary Recall:  Breakfast: oatmeal, 1 tbsp brown sugar and cinnamon  Lunch: pork chops, dinner roll, grapes  Dinner: pork chops, tri-tip steak, potatoes  Typical Snacks: homemade granola bar  Overnight eating: No  Eating out: rare, maybe twice a month    Beverages: water throughout the day, diet coke, Mt Dew zero- starts work at 5:30a, wakes at 3:30a    Exercise: biking 5-6 miles most days, yard work    Nutrition Diagnosis (PES statement):   Overweight/Obesity (NC 3.3) related to overeating and poor lifestyle habits as evidenced by  patient report of large portions, lack of activity and BMI 52.77      Nutrition Intervention  1. Food and/or Nutrient Delivery   a. Placed emphasis on importance of developing a healthy meal routine, aiming for 3 meals a day and no snacks.  2. Nutrition Education   a. Discussed with patient how to build a meal: the importance of including a lean/low fat protein at each meal, include a source of vegetables at a minimum of lunch and dinner and limiting carbohydrate intake  b. Educated on sources of lean protein, portion sizes, the amount of grams found in each source. Recommend patient to aim for 20-30g protein at each meal.  c. Discussed the importance of adequate hydration, with emphasis on drinking 64oz of water or zero calorie beverages per day.  3. Nutrition Counseling   a. Encouraged importance of developing routine exercise for health benefits and weight loss.    Goals established by patient:   1. Protein at breakfast  2. Add afternoon snack    Handouts provided:  Keys to success for weight management    Assessment/Plan:    Pt will follow up in 1 month(s) with bariatrician and 2 month(s) with dietitian.       Video-Visit Details    Type of service:  Video Visit    Video End Time:5:35p    Originating Location (pt. Location): Home    Distant Location (provider location):  [facility] [place]     Platform used for Video Visit: Darron PEÑA RD

## 2022-10-03 ENCOUNTER — TELEPHONE (OUTPATIENT)
Dept: SURGERY | Facility: CLINIC | Age: 42
End: 2022-10-03

## 2022-10-03 NOTE — TELEPHONE ENCOUNTER
Sent video link via text and attempted to call for video visit at 5:30p. Unable to leave a message at this time as mailbox is full.

## 2022-11-09 ENCOUNTER — TELEPHONE (OUTPATIENT)
Dept: SURGERY | Facility: CLINIC | Age: 42
End: 2022-11-09

## 2022-11-10 NOTE — TELEPHONE ENCOUNTER
Central Prior Authorization Team   Phone: [phone]    PA Initiation    Medication: Saxenda  Insurance Company: [employer] - Phone [phone] Fax [phone]  Pharmacy Filling the Rx: [id number] - [place] - [address] [place] [address]  Filling Pharmacy Phone: [phone]  Filling Pharmacy Fax: [phone]  Start Date: 11/10/2022

## 2022-11-10 NOTE — TELEPHONE ENCOUNTER
PA Renewal needed for Saxenda. Please submit and let Liaison know when Approved / Denied     [facility] KEY:  [id number]

## 2022-11-10 NOTE — TELEPHONE ENCOUNTER
Prior Authorization Approval        Authorization Effective Date: 10/11/2022  Authorization Expiration Date: 3/10/2023  Medication: Saxenda-PA APPROVED   Approved Dose/Quantity:   Reference #:     Insurance Company: [employer] - Phone [phone] Fax [phone]  Expected CoPay:       CoPay Card Available:      Foundation Assistance Needed:    Which Pharmacy is filling the prescription (Not needed for infusion/clinic administered): [id number] - [place], [place] - [address] [place] [address]  Pharmacy Notified: Yes- **Instructed pharmacy to notify patient when script is ready to /ship.**  Patient Notified: Yes

## 2022-11-19 ENCOUNTER — HEALTH MAINTENANCE LETTER (OUTPATIENT)
Age: 42
End: 2022-11-19

## 2022-11-24 DIAGNOSIS — I10 ESSENTIAL HYPERTENSION: ICD-10-CM

## 2022-11-28 ENCOUNTER — OFFICE VISIT (OUTPATIENT)
Dept: FAMILY MEDICINE | Facility: OTHER | Age: 42
End: 2022-11-28
Payer: COMMERCIAL

## 2022-11-28 VITALS
DIASTOLIC BLOOD PRESSURE: 76 MMHG | HEART RATE: 65 BPM | HEIGHT: 73 IN | WEIGHT: 315 LBS | OXYGEN SATURATION: 97 % | RESPIRATION RATE: 18 BRPM | SYSTOLIC BLOOD PRESSURE: 128 MMHG | TEMPERATURE: 98.3 F | BODY MASS INDEX: 41.75 KG/M2

## 2022-11-28 DIAGNOSIS — I10 ESSENTIAL HYPERTENSION: Primary | ICD-10-CM

## 2022-11-28 DIAGNOSIS — M17.11 PRIMARY OSTEOARTHRITIS OF RIGHT KNEE: ICD-10-CM

## 2022-11-28 DIAGNOSIS — E66.01 MORBID OBESITY (H): ICD-10-CM

## 2022-11-28 LAB
ANION GAP SERPL CALCULATED.3IONS-SCNC: 2 MMOL/L (ref 3–14)
BUN SERPL-MCNC: 16 MG/DL (ref 7–30)
CALCIUM SERPL-MCNC: 10.1 MG/DL (ref 8.5–10.1)
CHLORIDE BLD-SCNC: 110 MMOL/L (ref 94–109)
CO2 SERPL-SCNC: 26 MMOL/L (ref 20–32)
CREAT SERPL-MCNC: 0.77 MG/DL (ref 0.66–1.25)
GFR SERPL CREATININE-BSD FRML MDRD: >90 ML/MIN/1.73M2
GLUCOSE BLD-MCNC: 94 MG/DL (ref 70–99)
POTASSIUM BLD-SCNC: 4.7 MMOL/L (ref 3.4–5.3)
SODIUM SERPL-SCNC: 138 MMOL/L (ref 133–144)

## 2022-11-28 PROCEDURE — 20610 DRAIN/INJ JOINT/BURSA W/O US: CPT | Performed by: PHYSICIAN ASSISTANT

## 2022-11-28 PROCEDURE — 36415 COLL VENOUS BLD VENIPUNCTURE: CPT | Performed by: PHYSICIAN ASSISTANT

## 2022-11-28 PROCEDURE — 99213 OFFICE O/P EST LOW 20 MIN: CPT | Mod: 25 | Performed by: PHYSICIAN ASSISTANT

## 2022-11-28 PROCEDURE — 80048 BASIC METABOLIC PNL TOTAL CA: CPT | Performed by: PHYSICIAN ASSISTANT

## 2022-11-28 RX ORDER — AMLODIPINE BESYLATE 10 MG/1
10 TABLET ORAL DAILY
Qty: 90 TABLET | Refills: 3 | OUTPATIENT
Start: 2022-11-28

## 2022-11-28 RX ORDER — LISINOPRIL 40 MG/1
40 TABLET ORAL DAILY
Qty: 90 TABLET | Refills: 3 | OUTPATIENT
Start: 2022-11-28

## 2022-11-28 RX ORDER — TRIAMCINOLONE ACETONIDE 40 MG/ML
80 INJECTION, SUSPENSION INTRA-ARTICULAR; INTRAMUSCULAR ONCE
Status: COMPLETED | OUTPATIENT
Start: 2022-11-28 | End: 2022-11-28

## 2022-11-28 RX ADMIN — TRIAMCINOLONE ACETONIDE 80 MG: 40 INJECTION, SUSPENSION INTRA-ARTICULAR; INTRAMUSCULAR at 08:30

## 2022-11-28 ASSESSMENT — PAIN SCALES - GENERAL: PAINLEVEL: MILD PAIN (2)

## 2022-11-28 NOTE — PROGRESS NOTES
"  Assessment & Plan     Essential hypertension  BP is 128/76 today. This is great. Patient was advised on weight loss and healthy diet. He will continue to monitor at home over the winter.   - BASIC METABOLIC PANEL; Future  - BASIC METABOLIC PANEL    Primary osteoarthritis of right knee  Last injection was 6 months ago. Patient had tolerated it without adverse effect. Feels that it wore off more quickly than previous. I did recommend PT, but patient declined. He was strongly urged to reconsider. May want to consider rooster comb injection to reduce amount of cortisone on joint.     A steroid injection was performed at right lateral knee using 1% plain Lidocaine and 80 mg of Kenalog. This was well tolerated.    - triamcinolone (KENALOG-40) injection 80 mg    Morbid obesity (H)  Patient is aware that weight loss of 5% or more would benefit several areas of his health including joint pains. He has been working with bariatric specialist who started him on saxenda, but he has been experiencing diarrhea with increased doses. Advised him to reach out to this provider to discuss whether an alternative medication would be beneficial.        BMI:   Estimated body mass index is 51.47 kg/m  as calculated from the following:    Height as of this encounter: 1.854 m (6' 0.99\").    Weight as of this encounter: 176.9 kg (390 lb).   Weight management plan: Patient referred to endocrine and/or weight management specialty      Return in about 6 months (around 5/28/2023) for Return for scheduled annual checkup with PCP.    JACQUES [facility] BRISEIDA Quintana is a 42 year old, presenting for the following health issues:  Knee Pain      History of Present Illness       Reason for visit:  Cortizone shot in knee    He eats 2-3 servings of fruits and vegetables daily.He consumes 1 sweetened beverage(s) daily.He exercises with enough effort to increase his heart rate 20 to 29 minutes per day.  He " exercises with enough effort to increase his heart rate 3 or less days per week.   He is taking medications regularly.      Review of Systems   Constitutional, HEENT, cardiovascular, pulmonary, gi and gu systems are negative, except as otherwise noted.      Objective    /76 (BP Location: Other (Comment), Cuff Size: Adult Large)   Pulse 65   Temp 98.3  F (36.8  C) (Temporal)   Resp 18   SpO2 97%   There is no height or weight on file to calculate BMI.  Physical Exam   GENERAL: healthy, alert, no distress and obese  NECK: no adenopathy, no asymmetry, masses, or scars and thyroid normal to palpation  RESP: lungs clear to auscultation - no rales, rhonchi or wheezes  CV: regular rate and rhythm, normal S1 S2, no S3 or S4, no murmur, click or rub, no peripheral edema and peripheral pulses strong  MS: no gross musculoskeletal defects noted, no edema  PSYCH: mentation appears normal, affect normal/bright

## 2022-11-28 NOTE — PATIENT INSTRUCTIONS
Vaccines you can get:  Covid booster - bivalent   Tdap (tetanus) - get one every 10 years  Pneumonia (PCV20) - due to history of tobacco      Call bariatric specialist, Dr. Carias at [phone] to follow up on weight loss medication.

## 2023-02-16 ENCOUNTER — MYC MEDICAL ADVICE (OUTPATIENT)
Dept: FAMILY MEDICINE | Facility: OTHER | Age: 43
End: 2023-02-16
Payer: COMMERCIAL

## 2023-02-16 DIAGNOSIS — M17.11 PRIMARY OSTEOARTHRITIS OF RIGHT KNEE: Primary | ICD-10-CM

## 2023-03-06 ENCOUNTER — MYC MEDICAL ADVICE (OUTPATIENT)
Dept: FAMILY MEDICINE | Facility: OTHER | Age: 43
End: 2023-03-06
Payer: COMMERCIAL

## 2023-03-06 DIAGNOSIS — I10 ESSENTIAL HYPERTENSION: ICD-10-CM

## 2023-03-06 DIAGNOSIS — E66.01 MORBID OBESITY WITH BMI OF 50.0-59.9, ADULT (H): Primary | ICD-10-CM

## 2023-03-06 RX ORDER — LISINOPRIL 40 MG/1
TABLET ORAL
Qty: 90 TABLET | Refills: 0 | OUTPATIENT
Start: 2023-03-06

## 2023-03-06 RX ORDER — AMLODIPINE BESYLATE 10 MG/1
TABLET ORAL
Qty: 90 TABLET | Refills: 0 | OUTPATIENT
Start: 2023-03-06

## 2023-03-06 RX ORDER — BUPROPION HYDROCHLORIDE 75 MG/1
TABLET ORAL
Qty: 180 TABLET | Refills: 0 | Status: SHIPPED | OUTPATIENT
Start: 2023-03-06 | End: 2023-06-02

## 2023-03-06 RX ORDER — NALTREXONE HYDROCHLORIDE 50 MG/1
TABLET, FILM COATED ORAL
Qty: 90 TABLET | Refills: 0 | Status: SHIPPED | OUTPATIENT
Start: 2023-03-06 | End: 2023-06-02

## 2023-03-06 RX ORDER — AMLODIPINE BESYLATE 10 MG/1
10 TABLET ORAL DAILY
Qty: 90 TABLET | Refills: 0 | Status: SHIPPED | OUTPATIENT
Start: 2023-03-06 | End: 2023-06-02

## 2023-03-06 RX ORDER — LISINOPRIL 40 MG/1
40 TABLET ORAL DAILY
Qty: 90 TABLET | Refills: 0 | Status: SHIPPED | OUTPATIENT
Start: 2023-03-06 | End: 2023-06-02

## 2023-03-06 NOTE — PROGRESS NOTES
Saxenda not well tolerated due to nausea side effects. Will start trial of bupropion/naltrexone and follow up this spring on tolerance/efficacy.  Demario Snider MD

## 2023-03-16 ENCOUNTER — MYC MEDICAL ADVICE (OUTPATIENT)
Dept: FAMILY MEDICINE | Facility: OTHER | Age: 43
End: 2023-03-16

## 2023-03-16 ENCOUNTER — OFFICE VISIT (OUTPATIENT)
Dept: FAMILY MEDICINE | Facility: OTHER | Age: 43
End: 2023-03-16
Payer: COMMERCIAL

## 2023-03-16 VITALS
BODY MASS INDEX: 42.66 KG/M2 | RESPIRATION RATE: 10 BRPM | HEIGHT: 72 IN | DIASTOLIC BLOOD PRESSURE: 58 MMHG | TEMPERATURE: 98.4 F | HEART RATE: 72 BPM | OXYGEN SATURATION: 97 % | SYSTOLIC BLOOD PRESSURE: 116 MMHG | WEIGHT: 315 LBS

## 2023-03-16 DIAGNOSIS — E66.01 MORBID OBESITY (H): ICD-10-CM

## 2023-03-16 DIAGNOSIS — S86.111A STRAIN OF RIGHT GASTROCNEMIUS MUSCLE, INITIAL ENCOUNTER: Primary | ICD-10-CM

## 2023-03-16 PROCEDURE — 90715 TDAP VACCINE 7 YRS/> IM: CPT | Performed by: PHYSICIAN ASSISTANT

## 2023-03-16 PROCEDURE — 90471 IMMUNIZATION ADMIN: CPT | Performed by: PHYSICIAN ASSISTANT

## 2023-03-16 PROCEDURE — 99213 OFFICE O/P EST LOW 20 MIN: CPT | Mod: 25 | Performed by: PHYSICIAN ASSISTANT

## 2023-03-16 RX ORDER — PREDNISONE 20 MG/1
20 TABLET ORAL DAILY
Qty: 10 TABLET | Refills: 0 | Status: SHIPPED | OUTPATIENT
Start: 2023-03-16 | End: 2023-03-26

## 2023-03-16 RX ORDER — TIZANIDINE 2 MG/1
2-4 TABLET ORAL 3 TIMES DAILY PRN
Qty: 60 TABLET | Refills: 0 | Status: SHIPPED | OUTPATIENT
Start: 2023-03-16 | End: 2024-09-20

## 2023-03-16 ASSESSMENT — PAIN SCALES - GENERAL: PAINLEVEL: MILD PAIN (2)

## 2023-03-16 NOTE — PROGRESS NOTES
Assessment & Plan     Strain of right gastrocnemius muscle, initial encounter  Morbid obesity  Patient is a 43 year old male who presents today with posteromedial left knee pain. He has had pains in this knee off/on for some time. He says that this pain began about 1-2 months ago while brushing teeth in bathroom. He turned/twisted and felt a pain/shift in the posterior knee. No immediate pain following this event, but woke with pain the next day. Over this time the pain has been sporadic. Seems to be triggered the longer he is on his feet or with movements such as flexion of the knee. Pain is rated at moderate to severe when present. On exam normal AROM and 5/5 to resisted movements. Tenderness to the medial gastrocnemius muscle. Discussed suspicion for strain of this muscle and advised stretching, heat/ice, activity as tolerated. He will have short prednisone burst to use for the next 5 days and muscle relaxant as needed. He was educated on the possible adverse effects of each medication. If not improving he will reach out for PT vs ortho referral. Reference material and stretches provided to patient.     - predniSONE (DELTASONE) 20 MG tablet; Take 1 tablet (20 mg) by mouth daily for 10 days  - tiZANidine (ZANAFLEX) 2 MG tablet; Take 1-2 tablets (2-4 mg) by mouth 3 times daily as needed for muscle spasms (Can cause sedation)    Return in about 6 months (around 9/16/2023) for Return for scheduled annual checkup with PCP.    JACQUES [facility] BRISEIDA Quintana is a 43 year old, presenting for the following health issues:  Injections and Recheck Medication      History of Present Illness       Reason for visit:  Cortisone shot in knee    He eats 2-3 servings of fruits and vegetables daily.He consumes 1 sweetened beverage(s) daily.He exercises with enough effort to increase his heart rate 10 to 19 minutes per day.  He exercises with enough effort to increase his heart rate  "3 or less days per week.   He is taking medications regularly.       Hypertension Follow-up      Do you check your blood pressure regularly outside of the clinic? Yes     Are you following a low salt diet? Yes    Are your blood pressures ever more than 140 on the top number (systolic) OR more   than 90 on the bottom number (diastolic), for example 140/90? No     Review of Systems   Constitutional, HEENT, cardiovascular, pulmonary, gi and gu systems are negative, except as otherwise noted.      Objective    /58 (Cuff Size: Adult Large)   Pulse 72   Temp 98.4  F (36.9  C) (Oral)   Resp 10   Ht 1.82 m (5' 11.65\")   Wt (!) 177.6 kg (391 lb 8 oz)   SpO2 97%   BMI 53.61 kg/m    Body mass index is 53.61 kg/m .  Physical Exam   GENERAL: healthy, alert and no distress  RESP: lungs clear to auscultation - no rales, rhonchi or wheezes  CV: regular rate and rhythm, normal S1 S2, no S3 or S4, no murmur, click or rub, no peripheral edema and peripheral pulses strong  MS: Normal AROM of knees/hips and ankles. 5/5 strength to resisted AROM in the right knee  Neg varus/valgus, anterior drawer and mcmurrary  PSYCH: mentation appears normal, affect normal/bright                "

## 2023-04-15 ENCOUNTER — HEALTH MAINTENANCE LETTER (OUTPATIENT)
Age: 43
End: 2023-04-15

## 2023-06-02 ENCOUNTER — OFFICE VISIT (OUTPATIENT)
Dept: FAMILY MEDICINE | Facility: OTHER | Age: 43
End: 2023-06-02
Payer: COMMERCIAL

## 2023-06-02 VITALS
HEART RATE: 76 BPM | RESPIRATION RATE: 22 BRPM | DIASTOLIC BLOOD PRESSURE: 70 MMHG | TEMPERATURE: 98.3 F | BODY MASS INDEX: 42.66 KG/M2 | SYSTOLIC BLOOD PRESSURE: 114 MMHG | HEIGHT: 72 IN | OXYGEN SATURATION: 96 % | WEIGHT: 315 LBS

## 2023-06-02 DIAGNOSIS — I10 ESSENTIAL HYPERTENSION: Primary | ICD-10-CM

## 2023-06-02 DIAGNOSIS — M17.11 PRIMARY OSTEOARTHRITIS OF RIGHT KNEE: ICD-10-CM

## 2023-06-02 DIAGNOSIS — E66.01 MORBID OBESITY (H): ICD-10-CM

## 2023-06-02 PROCEDURE — 99214 OFFICE O/P EST MOD 30 MIN: CPT | Mod: 25 | Performed by: PHYSICIAN ASSISTANT

## 2023-06-02 PROCEDURE — 96372 THER/PROPH/DIAG INJ SC/IM: CPT | Performed by: PHYSICIAN ASSISTANT

## 2023-06-02 RX ORDER — TRIAMCINOLONE ACETONIDE 40 MG/ML
40 INJECTION, SUSPENSION INTRA-ARTICULAR; INTRAMUSCULAR ONCE
Status: COMPLETED | OUTPATIENT
Start: 2023-06-02 | End: 2023-06-02

## 2023-06-02 RX ORDER — AMLODIPINE BESYLATE 10 MG/1
10 TABLET ORAL DAILY
Qty: 90 TABLET | Refills: 1 | Status: SHIPPED | OUTPATIENT
Start: 2023-06-02 | End: 2023-11-27

## 2023-06-02 RX ORDER — LISINOPRIL 40 MG/1
40 TABLET ORAL DAILY
Qty: 90 TABLET | Refills: 1 | Status: SHIPPED | OUTPATIENT
Start: 2023-06-02 | End: 2023-11-27

## 2023-06-02 RX ADMIN — TRIAMCINOLONE ACETONIDE 40 MG: 40 INJECTION, SUSPENSION INTRA-ARTICULAR; INTRAMUSCULAR at 08:11

## 2023-06-02 ASSESSMENT — PAIN SCALES - GENERAL: PAINLEVEL: MODERATE PAIN (4)

## 2023-06-02 NOTE — PROGRESS NOTES
Assessment & Plan     Essential hypertension  BP is 114/70 today, this is excellent. Patient has been taking antihypertensive medication without adverse effect. He will work on maintaining a healthy diet low in salts/sugars/fatty&greasy foods with regular servings of fruits and vegetables. Also, try to get in 30 minutes of aerobic exercise 5 or more days each week as able.  - lisinopril (ZESTRIL) 40 MG tablet; Take 1 tablet (40 mg) by mouth daily  - amLODIPine (NORVASC) 10 MG tablet; Take 1 tablet (10 mg) by mouth daily    Primary osteoarthritis of right knee  A steroid injection was performed at lateral right knee using 1% plain Lidocaine and 40 mg of Kenalog. This was well tolerated. Discussed with patient that continued cortisone injections can be harmful to knee joints. Orthopedic referral placed if pains persist. Can meet with specialist to discuss cortisone alternatives.   - Orthopedic  Referral; Future  - triamcinolone (KENALOG-40) injection 40 mg    Morbid obesity (H)  Patient has friends/coworkers who have successfully lost weight on wegovy. We discussed possible adverse effects and potential for high cost as well as supply chain issues. I have sent this to his pharmacy. He will reach out if he is unable to fill the medication.   - Semaglutide-Weight Management (WEGOVY) 0.25 MG/0.5ML pen; Inject 0.25 mg Subcutaneous once a week     BMI:   Estimated body mass index is 55.06 kg/m  as calculated from the following:    Height as of this encounter: 1.829 m (6').    Weight as of this encounter: 184.2 kg (406 lb).   Weight management plan: JACQUES [facility] BRISEIDA Quintana is a 43 year old, presenting for the following health issues:  Cortisone injection (Right knee) and Recheck Medication        6/2/2023     7:40 AM   Additional Questions   Roomed by Cynthia RUTH   Accompanied by self     History of Present Illness       Hypertension: He presents  for follow up of hypertension.  He does not check blood pressure  regularly outside of the clinic. Outpatient blood pressures have not been over 140/90. He follows a low salt diet.     Reason for visit:  BP medication check and cortisone shot in knee    He eats 2-3 servings of fruits and vegetables daily.He consumes 1 sweetened beverage(s) daily.He exercises with enough effort to increase his heart rate 20 to 29 minutes per day.  He exercises with enough effort to increase his heart rate 3 or less days per week.   He is taking medications regularly.    He would like a cortisone injection done for his right knee today and just needs his refills for his blood pressure medications.     Review of Systems   Constitutional, HEENT, cardiovascular, pulmonary, gi and gu systems are negative, except as otherwise noted.      Objective    /70   Pulse 76   Temp 98.3  F (36.8  C) (Temporal)   Resp 22   Ht 1.829 m (6')   Wt (!) 184.2 kg (406 lb)   SpO2 96%   BMI 55.06 kg/m    Body mass index is 55.06 kg/m .  Physical Exam   GENERAL: healthy, alert, no distress and obese  RESP: lungs clear to auscultation - no rales, rhonchi or wheezes  CV: regular rate and rhythm, normal S1 S2, no S3 or S4, no murmur, click or rub, no peripheral edema and peripheral pulses strong  MS: no gross musculoskeletal defects noted, no edema  PSYCH: mentation appears normal, affect normal/bright

## 2023-06-06 ENCOUNTER — MYC MEDICAL ADVICE (OUTPATIENT)
Dept: FAMILY MEDICINE | Facility: OTHER | Age: 43
End: 2023-06-06
Payer: COMMERCIAL

## 2023-06-06 DIAGNOSIS — E66.01 MORBID OBESITY (H): ICD-10-CM

## 2023-06-09 ENCOUNTER — TELEPHONE (OUTPATIENT)
Dept: FAMILY MEDICINE | Facility: OTHER | Age: 43
End: 2023-06-09
Payer: COMMERCIAL

## 2023-06-09 NOTE — TELEPHONE ENCOUNTER
Call from [facility] regarding Wegovy.     Pharmacist states script on 6/8/23 for Wegovy 1 mg/0.5 ml pen is an auto-injector, cannot give a partial dose of an auto-injector.   They cannot fill this prescription but can fill the one sent on 6/2/23.    Pharmacist states insurance is still showing PA is needed, though encounter 6/2/23 shows PA was approved for WEGOVY 0.25 MG/0.5ML SC SOAJ.   They will follow up with insurance to clarify.     Alivia GONGN, RN  [facility]       FAMILY HISTORY:  Mother  Still living? Unknown  Family history of hypertension, Age at diagnosis: Age Unknown

## 2023-06-14 DIAGNOSIS — E66.01 MORBID OBESITY (H): ICD-10-CM

## 2023-06-14 NOTE — TELEPHONE ENCOUNTER
Pending Prescriptions:                       Disp   Refills    Semaglutide-Weight Management (WEGOVY) 0.2*2 mL   0        Sig: Inject 0.25 mg Subcutaneous once a week    Routing refill request to provider for review/approval because:  Switching to mail order    PHQ-9 score:         View : No data to display.                  Requested Prescriptions   Pending Prescriptions Disp Refills    Semaglutide-Weight Management (WEGOVY) 0.25 MG/0.5ML pen 2 mL 0     Sig: Inject 0.25 mg Subcutaneous once a week       There is no refill protocol information for this order

## 2023-07-12 ENCOUNTER — MYC MEDICAL ADVICE (OUTPATIENT)
Dept: FAMILY MEDICINE | Facility: OTHER | Age: 43
End: 2023-07-12
Payer: COMMERCIAL

## 2023-07-12 DIAGNOSIS — E66.01 CLASS 3 SEVERE OBESITY DUE TO EXCESS CALORIES WITHOUT SERIOUS COMORBIDITY WITH BODY MASS INDEX (BMI) OF 45.0 TO 49.9 IN ADULT (H): Primary | ICD-10-CM

## 2023-07-12 DIAGNOSIS — E66.813 CLASS 3 SEVERE OBESITY DUE TO EXCESS CALORIES WITHOUT SERIOUS COMORBIDITY WITH BODY MASS INDEX (BMI) OF 45.0 TO 49.9 IN ADULT (H): Primary | ICD-10-CM

## 2023-08-15 ENCOUNTER — OFFICE VISIT (OUTPATIENT)
Dept: ORTHOPEDICS | Facility: CLINIC | Age: 43
End: 2023-08-15
Payer: COMMERCIAL

## 2023-08-15 ENCOUNTER — ANCILLARY PROCEDURE (OUTPATIENT)
Dept: GENERAL RADIOLOGY | Facility: CLINIC | Age: 43
End: 2023-08-15
Attending: PHYSICIAN ASSISTANT
Payer: COMMERCIAL

## 2023-08-15 VITALS
DIASTOLIC BLOOD PRESSURE: 82 MMHG | HEIGHT: 72 IN | SYSTOLIC BLOOD PRESSURE: 124 MMHG | WEIGHT: 315 LBS | BODY MASS INDEX: 42.66 KG/M2

## 2023-08-15 DIAGNOSIS — M17.11 PRIMARY OSTEOARTHRITIS OF RIGHT KNEE: Primary | ICD-10-CM

## 2023-08-15 DIAGNOSIS — M25.561 RIGHT KNEE PAIN: ICD-10-CM

## 2023-08-15 PROCEDURE — 73564 X-RAY EXAM KNEE 4 OR MORE: CPT | Mod: TC | Performed by: RADIOLOGY

## 2023-08-15 PROCEDURE — 99203 OFFICE O/P NEW LOW 30 MIN: CPT | Performed by: PHYSICIAN ASSISTANT

## 2023-08-15 ASSESSMENT — PAIN SCALES - GENERAL: PAINLEVEL: NO PAIN (0)

## 2023-08-15 NOTE — LETTER
8/15/2023         RE: Dontrell Schulz  [address] [facility] [address]        Dear Colleague,    Thank you for referring your patient, Dontrell Schulz, to the [facility]. Please see a copy of my visit note below.    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      He is being seen for   Chief Complaints and History of Present Illnesses   Patient presents with     Knee Pain     Right knee pain     Consult     Ref; self         History of Present Illness:   Mechanism of Injury: No injury fall or trauma  Location: Right lateral knee  Duration of Pain: About 5 years  Rating of Pain: 0 out of 10 currently but can be mild to moderate  Pain Quality: Achy  Pain is better with: Rest and Aleve and steroid injections  Pain is worse with: Heavy activity.  Sitting for long period time.  Treatment so far consists of: Patient was last seen by JACQUES Tang who saw the patient on 6/2/2023 and at that time did a steroid injection and referral to orthopedics to discuss further options.  Patient has also tried 3 steroid injections in the past which all have helped him.  Tylenol has helped him.  He has done prednisone which has helped.  Patient takes Aleve 4 mg at night in the morning and that helps also.  Patient has not done formal physical therapy although was ordered before.  Patient has been trying weight loss and has lost 100 pounds but then gained it back and then some unfortunately.   Associated Features: Denies numbness tingling shooting burning electric pain.  Prior history of related problems: No previous surgery or trauma or fractures on the right knee.  Pain is Limiting: Heavy use of the right knee.  Comfort when sleeping.  Here to: Orthopedic consultation  The Pain Has: Gotten worse over time  Additional History: Patient is  interested in hyaluronic acid injections.      Patient's past medical, surgical, social and family histories reviewed.     Past Medical History:   Diagnosis Date     Hypertension         Past Surgical History:   Procedure Laterality Date     EYE SURGERY      trauma related - Retinal injury       Medications:  amLODIPine (NORVASC) 10 MG tablet, Take 1 tablet (10 mg) by mouth daily  lisinopril (ZESTRIL) 40 MG tablet, Take 1 tablet (40 mg) by mouth daily  Semaglutide-Weight Management (WEGOVY) 0.5 MG/0.5ML pen, Inject 0.5 mg Subcutaneous once a week  order for DME, Thigh High HEIDI stockings (Patient not taking: Reported on 8/15/2023)  tiZANidine (ZANAFLEX) 2 MG tablet, Take 1-2 tablets (2-4 mg) by mouth 3 times daily as needed for muscle spasms (Can cause sedation) (Patient not taking: Reported on 8/15/2023)    No current facility-administered medications on file prior to visit.      Allergies   Allergen Reactions     Nkda [No Known Drug Allergy]        Social History     Occupational History     Not on file   Tobacco Use     Smoking status: Former     Packs/day: 1.00     Years: 10.00     Pack years: 10.00     Types: Cigarettes     Quit date: 2021     Years since quittin.9     Smokeless tobacco: Not on file   Vaping Use     Vaping Use: Former     Substances: Flavoring     Devices: Pre-filled or refillable cartridge, Refillable tank   Substance and Sexual Activity     Alcohol use: Yes     Comment: occas     Drug use: No     Sexual activity: Yes     Partners: Female     Birth control/protection: I.U.D.       No family history on file.    REVIEW OF SYSTEMS  10 point review systems performed otherwise negative as noted as per history of present illness.    Physical Exam:  Vitals: /82   Ht 1.829 m (6')   Wt (!) 182 kg (401 lb 4.8 oz)   BMI 54.43 kg/m    BMI= Body mass index is 54.43 kg/m .    Constitutional: healthy, alert and no acute distress   Psychiatric: mentation appears normal and affect  normal/bright  NEURO: no focal deficits, CMS intact right lower extremity   RESP: Normal with easy respirations and no use of accessory muscles to breathe, no audible wheezing or retractions  CV: Calf soft and nontender to palpation, leg warm   SKIN: No erythema, rashes, excoriation, or breakdown. No evidence of infection.   MUSCULOSKELETAL:  INSPECTION of righte knee: No gross deformities, erythema, edema, ecchymosis, atrophy or fasciculations.  Slight valgus deformity.  PALPATION: No tenderness medial, lateral, anterior and posterior portion of the knee. No specific joint line tenderness. No increased warmth.  No effusion.   ROM: Passive: Extension full, flexion to 125 . All range of motion without catching, locking or pain.     STRENGTH: 5 out of 5 quad and hamstring without pain.   SPECIAL TEST: Patient has a negative Lachman's negative drawer sign. Patient's knee is stable to varus and valgus stress at 30  of flexion. Patient has a negative Aida's.   GAIT: non-antalgic  Lymph: no palpable lymph nodes    Diagnostic Modalities:  X-rays done today showing no fracture no dislocation no tumor.  We see moderate to severe joint space narrowing in the lateral compartment on the right knee.  Mild joint space narrowing in the patellofemoral compartment bilaterally.  Alignment shows slight valgus deformity.  Patellas and central in the trochlear groove.    Independent visualization of the images was performed.    Impression: 1.  Right knee primary osteoarthritis, moderate to severe lateral and mild patellofemoral.    Plan:  All of the above pertinent physical exam and imaging modalities findings was reviewed with Dontrell.    FOCUSED PLAN:  Discussed all treatment modalities for osteoarthritis of his knee.  On x-rays we see lateral joint space narrowing which is where he is having most of his pain.  Patient still doing well from the steroid injection that was done 2 months ago.  Discussed steroid injection can be done  every 3 months although would like him to hold off as long as he can between injections because he is pretty young.  Discussed Synvisc 1 injections and put in an approval to check with his insurance.  He can have this every 6 months.  Discussed Mobic and Voltaren gel but he will hold off on these for now.  Discussed weight loss and swimming and activity modification and nutritionist.  Patient already is seen a nutritionist/weight loss person and is getting injections.  He has lost some weight but he has gained it back unfortunately.  Discussed lateral offloading brace and put in a referral for orthotics so he can discuss this with insurance and possibly get 1.  After visit summary done with all of this information for him.  Follow-up for Synvisc 1 injection if he gets approved or on an as-needed basis.    Re-x-ray on return: No      This note was dictated with [employer].    Caden Mcnally PA-C        Again, thank you for allowing me to participate in the care of your patient.        Sincerely,        Caden Mcnally PA-C

## 2023-08-15 NOTE — PATIENT INSTRUCTIONS
Encounter Diagnosis   Name Primary?    Primary osteoarthritis of right knee Yes     Rest, ice and elevate above heart level as needed for pain control  1.  Synvisc 1 gel injection: We have to get this approved before you can do it.  This lubricates the knee.  He can do this every 6 months.  2.  Steroid injection: He can do this every 3 months if needed but since you are young we would rather you not do it too frequently.  3.  Mobic or meloxicam: Anti-inflammatory medication that is as powerful as 800 of ibuprofen 3 times a day but a little easier on her stomach and kidneys.  4.  Voltaren gel: Anti-inflammatory topical.  5.  Activity modification: No impact activity, avoid stairs and ladders and kneeling.  Biking and elliptical and swimming are good activities.  6.  Weight loss: Even 5 pounds helps tremendously for your knees.  7.  Nutritional consult: We can put this in for you also.  8.  Offloading brace: We would have you check with your insurance before doing this because it can be a charge.  We have this done in orthotics and it pushes in the weight onto the side of the knee that has more cartilage.  You are a good candidate for this because you have decent cartilage under your kneecap  9.  Formal therapy: I do an osteoarthritis program where they show you exercises and teach how to manage her arthritis.  It is 1-2 sessions.  10.  Swimming: There is a pool in [place] at the [facility] [employer] and also a pool in [place] at [facility].  11.  Follow-up: Follow-up for the gel injection if he gets improved or on an as-needed basis.  WebMD and [employer] may offer reliable information regarding your diagnosis and treatment plan.    THANK YOU for coming in today. If you receive a survey via [employer] or mail please let us know if there was anything you especially appreciated today or if there is any way we can improve our clinic. We appreciate your input.    GENERAL INFORMATION:  My hours are:  Monday: Clinic  720am-440pm  Tuesday: Clinic 8am-440pm  Wednesday: Surgery  Thursday: Admin  Friday: Surgery      [facility] for any issues or concerns: [phone]    I am not in the office Thursdays. Therefore non- urgent calls and medical messages received on Thursday will be addressed when we are back in the office on Wednesday. Urgent matters will be reviewed and addressed by one of our partners in the office as needed.    If lab work was done today as part of your evaluation you will generally be contacted via [employer], mail, or phone with the results within 1-5 days. If there is an alarming result we will contact you by phone. Lab results come back at varying times, I generally wait until all labs are resulted before making comments on results. Please note labs are automatically released to [employer] (if you have signed up for it) once available-at times you may see these prior to my having a chance to review them as well.    If you need refills please contact your pharmacist. They will send a refill request to me to review. Please allow 3 business days for us to process all refill requests. All narcotic refills should be handled in the clinic at the time of your visit.

## 2023-08-15 NOTE — PROGRESS NOTES
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      He is being seen for   Chief Complaints and History of Present Illnesses   Patient presents with    Knee Pain     Right knee pain    Consult     Ref; self         History of Present Illness:   Mechanism of Injury: No injury fall or trauma  Location: Right lateral knee  Duration of Pain: About 5 years  Rating of Pain: 0 out of 10 currently but can be mild to moderate  Pain Quality: Achy  Pain is better with: Rest and Aleve and steroid injections  Pain is worse with: Heavy activity.  Sitting for long period time.  Treatment so far consists of: Patient was last seen by JACQUES Tang who saw the patient on 6/2/2023 and at that time did a steroid injection and referral to orthopedics to discuss further options.  Patient has also tried 3 steroid injections in the past which all have helped him.  Tylenol has helped him.  He has done prednisone which has helped.  Patient takes Aleve 4 mg at night in the morning and that helps also.  Patient has not done formal physical therapy although was ordered before.  Patient has been trying weight loss and has lost 100 pounds but then gained it back and then some unfortunately.   Associated Features: Denies numbness tingling shooting burning electric pain.  Prior history of related problems: No previous surgery or trauma or fractures on the right knee.  Pain is Limiting: Heavy use of the right knee.  Comfort when sleeping.  Here to: Orthopedic consultation  The Pain Has: Gotten worse over time  Additional History: Patient is interested in hyaluronic acid injections.      Patient's past medical, surgical, social and family histories reviewed.     Past Medical History:   Diagnosis Date    Hypertension         Past Surgical History:   Procedure Laterality Date    EYE SURGERY  2000     trauma related - Retinal injury       Medications:  amLODIPine (NORVASC) 10 MG tablet, Take 1 tablet (10 mg) by mouth daily  lisinopril (ZESTRIL) 40 MG tablet, Take 1 tablet (40 mg) by mouth daily  Semaglutide-Weight Management (WEGOVY) 0.5 MG/0.5ML pen, Inject 0.5 mg Subcutaneous once a week  order for DME, Thigh High HEIDI stockings (Patient not taking: Reported on 8/15/2023)  tiZANidine (ZANAFLEX) 2 MG tablet, Take 1-2 tablets (2-4 mg) by mouth 3 times daily as needed for muscle spasms (Can cause sedation) (Patient not taking: Reported on 8/15/2023)    No current facility-administered medications on file prior to visit.      Allergies   Allergen Reactions    Nkda [No Known Drug Allergy]        Social History     Occupational History    Not on file   Tobacco Use    Smoking status: Former     Packs/day: 1.00     Years: 10.00     Pack years: 10.00     Types: Cigarettes     Quit date: 2021     Years since quittin.9    Smokeless tobacco: Not on file   Vaping Use    Vaping Use: Former    Substances: Flavoring    Devices: Pre-filled or refillable cartridge, Refillable tank   Substance and Sexual Activity    Alcohol use: Yes     Comment: occas    Drug use: No    Sexual activity: Yes     Partners: Female     Birth control/protection: I.U.D.       No family history on file.    REVIEW OF SYSTEMS  10 point review systems performed otherwise negative as noted as per history of present illness.    Physical Exam:  Vitals: /82   Ht 1.829 m (6')   Wt (!) 182 kg (401 lb 4.8 oz)   BMI 54.43 kg/m    BMI= Body mass index is 54.43 kg/m .    Constitutional: healthy, alert and no acute distress   Psychiatric: mentation appears normal and affect normal/bright  NEURO: no focal deficits, CMS intact right lower extremity   RESP: Normal with easy respirations and no use of accessory muscles to breathe, no audible wheezing or retractions  CV: Calf soft and nontender to palpation, leg warm   SKIN: No erythema, rashes,  excoriation, or breakdown. No evidence of infection.   MUSCULOSKELETAL:  INSPECTION of righte knee: No gross deformities, erythema, edema, ecchymosis, atrophy or fasciculations.  Slight valgus deformity.  PALPATION: No tenderness medial, lateral, anterior and posterior portion of the knee. No specific joint line tenderness. No increased warmth.  No effusion.   ROM: Passive: Extension full, flexion to 125 . All range of motion without catching, locking or pain.     STRENGTH: 5 out of 5 quad and hamstring without pain.   SPECIAL TEST: Patient has a negative Lachman's negative drawer sign. Patient's knee is stable to varus and valgus stress at 30  of flexion. Patient has a negative Aida's.   GAIT: non-antalgic  Lymph: no palpable lymph nodes    Diagnostic Modalities:  X-rays done today showing no fracture no dislocation no tumor.  We see moderate to severe joint space narrowing in the lateral compartment on the right knee.  Mild joint space narrowing in the patellofemoral compartment bilaterally.  Alignment shows slight valgus deformity.  Patellas and central in the trochlear groove.    Independent visualization of the images was performed.    Impression: 1.  Right knee primary osteoarthritis, moderate to severe lateral and mild patellofemoral.    Plan:  All of the above pertinent physical exam and imaging modalities findings was reviewed with Dontrell.    FOCUSED PLAN:  Discussed all treatment modalities for osteoarthritis of his knee.  On x-rays we see lateral joint space narrowing which is where he is having most of his pain.  Patient still doing well from the steroid injection that was done 2 months ago.  Discussed steroid injection can be done every 3 months although would like him to hold off as long as he can between injections because he is pretty young.  Discussed Synvisc 1 injections and put in an approval to check with his insurance.  He can have this every 6 months.  Discussed Mobic and Voltaren gel but  he will hold off on these for now.  Discussed weight loss and swimming and activity modification and nutritionist.  Patient already is seen a nutritionist/weight loss person and is getting injections.  He has lost some weight but he has gained it back unfortunately.  Discussed lateral offloading brace and put in a referral for orthotics so he can discuss this with insurance and possibly get 1.  After visit summary done with all of this information for him.  Follow-up for Synvisc 1 injection if he gets approved or on an as-needed basis.    Re-x-ray on return: No      This note was dictated with [facility].    Caden Mcnally PA-C

## 2023-08-16 ENCOUNTER — TELEPHONE (OUTPATIENT)
Dept: FAMILY MEDICINE | Facility: OTHER | Age: 43
End: 2023-08-16
Payer: COMMERCIAL

## 2023-08-16 NOTE — TELEPHONE ENCOUNTER
Central Prior Authorization Team - Phone: [phone]     PA Initiation    Medication: WEGOVY 0.5 MG/0.5ML SC SOAJ  Insurance Company: [employer] Non-Specialty PA's - Phone [phone] Fax [phone]  Pharmacy Filling the Rx: [facility] - [place], [place] - [address]  Filling Pharmacy Phone: [phone]  Filling Pharmacy Fax:    Start Date: 8/16/2023

## 2023-08-16 NOTE — TELEPHONE ENCOUNTER
Central Prior Authorization Team - Phone: [phone]     Prior Authorization Approval    Medication: WEGOVY 0.5 MG/0.5ML SC SOAJ  Authorization Effective Date: 7/17/2023  Authorization Expiration Date: 3/13/2024  Approved Dose/Quantity: 2  Reference #:     Insurance Company: [employer] Non-Specialty PA's - Phone [phone] Fax [phone]  Expected CoPay:       CoPay Card Available:      Financial Assistance Needed:   Which Pharmacy is filling the prescription: [facility] - [place], [place] - [address]  Pharmacy Notified: Yes called [facility] pharmacy , pharmacy tech stated the member has a [facility] pharmacy account and that all the patient has to do is reorder the medication and Rx can be filled/shipped  Patient Notified: Yes called Dontrell, no answer, left VM (cannot leave med name on VM) let know received approval on PA request and pharmacy was notified however Dontrell will still need to contact [facility] pharmacy or reorder the Rx inorder to give [facility] pharmacy authorization to fill and mail out. Left phone number in case other questions.

## 2023-08-24 ENCOUNTER — MYC MEDICAL ADVICE (OUTPATIENT)
Dept: FAMILY MEDICINE | Facility: OTHER | Age: 43
End: 2023-08-24
Payer: COMMERCIAL

## 2023-08-24 DIAGNOSIS — E66.813 CLASS 3 SEVERE OBESITY DUE TO EXCESS CALORIES WITHOUT SERIOUS COMORBIDITY WITH BODY MASS INDEX (BMI) OF 45.0 TO 49.9 IN ADULT (H): Primary | ICD-10-CM

## 2023-08-24 DIAGNOSIS — E66.01 CLASS 3 SEVERE OBESITY DUE TO EXCESS CALORIES WITHOUT SERIOUS COMORBIDITY WITH BODY MASS INDEX (BMI) OF 45.0 TO 49.9 IN ADULT (H): Primary | ICD-10-CM

## 2023-08-24 NOTE — TELEPHONE ENCOUNTER
PCP is out of the office, but per his last note, he was expecting a follow-up in Sept. Please schedule this and provider may bridge.   Ayesha Bañuelos MD

## 2023-08-28 ENCOUNTER — MYC MEDICAL ADVICE (OUTPATIENT)
Dept: ORTHOPEDICS | Facility: CLINIC | Age: 43
End: 2023-08-28
Payer: COMMERCIAL

## 2023-08-29 ENCOUNTER — OFFICE VISIT (OUTPATIENT)
Dept: ORTHOPEDICS | Facility: CLINIC | Age: 43
End: 2023-08-29
Payer: COMMERCIAL

## 2023-08-29 VITALS
HEIGHT: 72 IN | WEIGHT: 315 LBS | TEMPERATURE: 98 F | SYSTOLIC BLOOD PRESSURE: 122 MMHG | BODY MASS INDEX: 42.66 KG/M2 | DIASTOLIC BLOOD PRESSURE: 80 MMHG

## 2023-08-29 DIAGNOSIS — M17.11 PRIMARY OSTEOARTHRITIS OF RIGHT KNEE: Primary | ICD-10-CM

## 2023-08-29 PROCEDURE — 20610 DRAIN/INJ JOINT/BURSA W/O US: CPT | Mod: RT | Performed by: PHYSICIAN ASSISTANT

## 2023-08-29 ASSESSMENT — PAIN SCALES - GENERAL: PAINLEVEL: MODERATE PAIN (4)

## 2023-08-29 NOTE — LETTER
8/29/2023         RE: Dontrell Schulz  [address] [facility] [address]        Dear Colleague,    Thank you for referring your patient, Dontrell Schulz, to the [facility]. Please see a copy of my visit note below.    Office Visit-Follow up    Chief Complaint: Dontrell Schulz is a 43 year old male who is being seen for   Chief Complaint   Patient presents with     RECHECK     Right knee primary osteoarthritis, moderate to severe lateral and mild patellofemoral.       History of Present Illness:   Patient is here for follow-up hyaluronic acid injection.  Patient was last seen on 8/15/2023 and we went through all treatments for osteoarthritis.  Patient steroid injection done on 6/2/2023 was still working.  Patient is already seeing a nutritionist and getting injections for weight loss.  We did refer the patient to orthotics for offloading knee brace.  Patient was educated on Mobic and Voltaren but held on those.  He does feel that knee steroid injection done on 6/2/2023 is still working slightly.    Physical Exam:  Vitals: /80   Temp 98  F (36.7  C) (Temporal)   Ht 1.829 m (6')   Wt (!) 182 kg (401 lb 4.8 oz)   BMI 54.43 kg/m    BMI= Body mass index is 54.43 kg/m .  Constitutional: healthy, alert and no acute distress   Psychiatric: mentation appears normal and affect normal/bright  NEURO: no focal deficits, CMS intact right lower extremity   RESP: Normal with easy respirations and no use of accessory muscles to breathe, no audible wheezing or retractions  CV: Calf soft and nontender to palpation, leg warm   SKIN: No erythema, rashes, excoriation, or breakdown. No evidence of infection.   MUSCULOSKELETAL:  INSPECTION of right knee: No gross deformities, erythema, edema, ecchymosis, atrophy or fasciculations.   PALPATION: No tenderness on palpation of the medial, lateral, anterior and posterior portion of the knee. No specific joint line tenderness. No increased warmth.  No  effusion.   ROM: Able to flex and extend without any catching or locking or pain.  STRENGTH: Able to get on the exam table and off without any problems and able to ambulate without any problems.  SPECIAL TEST: None today.   GAIT: non-antalgic  Lymph: no palpable lymph nodes    Impression: 1. Right knee primary osteoarthritis, moderate to severe lateral and mild patellofemoral.     Plan:    Large Joint Injection/Arthocentesis: R knee joint    Date/Time: 8/29/2023 3:21 PM    Performed by: Caden Mcnally PA-C  Authorized by: Caden Mcnally PA-C    Indications:  Pain  Needle Size:  22 G  Guidance: landmark guided    Approach:  Anterolateral  Location:  Knee      Medications:  48 mg hylan 48 MG/6ML  Aspirate amount (mL):  0  Procedure discussed: discussed risks, benefits, and alternatives    Consent Given by:  Patient  Timeout: timeout called immediately prior to procedure    Prep: patient was prepped and draped in usual sterile fashion     The skin was prepped with betadine.  Patient was in a seated position.  I used ethyl chloride spray prior to doing the injection.  The patient tolerated the injection well, and there were no complications. The injection site was covered with a Band-Aid.       FOCUSED PLAN:    Steroid injection on 6/2/2023 is still working slightly but starting to wear off.  We did the Synvisc 1 injection to see if this will benefit the patient also.  He was looking into an offloading brace also.  He is holding on Mobic and Voltaren gel for now.  Follow-up on an as-needed basis.    Re-x-ray on return: No      This note was dictated with [employer].    Caden Mcnally PA-C        Again, thank you for allowing me to participate in the care of your patient.        Sincerely,        Caden Mcnally PA-C

## 2023-08-29 NOTE — PROGRESS NOTES
Office Visit-Follow up    Chief Complaint: Dontrell Schulz is a 43 year old male who is being seen for   Chief Complaint   Patient presents with    RECHECK     Right knee primary osteoarthritis, moderate to severe lateral and mild patellofemoral.       History of Present Illness:   Patient is here for follow-up hyaluronic acid injection.  Patient was last seen on 8/15/2023 and we went through all treatments for osteoarthritis.  Patient steroid injection done on 6/2/2023 was still working.  Patient is already seeing a nutritionist and getting injections for weight loss.  We did refer the patient to orthotics for offloading knee brace.  Patient was educated on Mobic and Voltaren but held on those.  He does feel that knee steroid injection done on 6/2/2023 is still working slightly.    Physical Exam:  Vitals: /80   Temp 98  F (36.7  C) (Temporal)   Ht 1.829 m (6')   Wt (!) 182 kg (401 lb 4.8 oz)   BMI 54.43 kg/m    BMI= Body mass index is 54.43 kg/m .  Constitutional: healthy, alert and no acute distress   Psychiatric: mentation appears normal and affect normal/bright  NEURO: no focal deficits, CMS intact right lower extremity   RESP: Normal with easy respirations and no use of accessory muscles to breathe, no audible wheezing or retractions  CV: Calf soft and nontender to palpation, leg warm   SKIN: No erythema, rashes, excoriation, or breakdown. No evidence of infection.   MUSCULOSKELETAL:  INSPECTION of right knee: No gross deformities, erythema, edema, ecchymosis, atrophy or fasciculations.   PALPATION: No tenderness on palpation of the medial, lateral, anterior and posterior portion of the knee. No specific joint line tenderness. No increased warmth.  No effusion.   ROM: Able to flex and extend without any catching or locking or pain.  STRENGTH: Able to get on the exam table and off without any problems and able to ambulate without any problems.  SPECIAL TEST: None today.   GAIT: non-antalgic  Lymph:  no palpable lymph nodes    Impression: 1. Right knee primary osteoarthritis, moderate to severe lateral and mild patellofemoral.     Plan:    Large Joint Injection/Arthocentesis: R knee joint    Date/Time: 8/29/2023 3:21 PM    Performed by: Caden Mcnally PA-C  Authorized by: Caden Mcnally PA-C    Indications:  Pain  Needle Size:  22 G  Guidance: landmark guided    Approach:  Anterolateral  Location:  Knee      Medications:  48 mg hylan 48 MG/6ML  Aspirate amount (mL):  0  Procedure discussed: discussed risks, benefits, and alternatives    Consent Given by:  Patient  Timeout: timeout called immediately prior to procedure    Prep: patient was prepped and draped in usual sterile fashion     The skin was prepped with betadine.  Patient was in a seated position.  I used ethyl chloride spray prior to doing the injection.  The patient tolerated the injection well, and there were no complications. The injection site was covered with a Band-Aid.       FOCUSED PLAN:    Steroid injection on 6/2/2023 is still working slightly but starting to wear off.  We did the Synvisc 1 injection to see if this will benefit the patient also.  He was looking into an offloading brace also.  He is holding on Mobic and Voltaren gel for now.  Follow-up on an as-needed basis.    Re-x-ray on return: No      This note was dictated with [employer].    Caden Mcnally PA-C

## 2023-09-10 DIAGNOSIS — E66.813 CLASS 3 SEVERE OBESITY DUE TO EXCESS CALORIES WITHOUT SERIOUS COMORBIDITY WITH BODY MASS INDEX (BMI) OF 45.0 TO 49.9 IN ADULT (H): ICD-10-CM

## 2023-09-10 DIAGNOSIS — E66.01 CLASS 3 SEVERE OBESITY DUE TO EXCESS CALORIES WITHOUT SERIOUS COMORBIDITY WITH BODY MASS INDEX (BMI) OF 45.0 TO 49.9 IN ADULT (H): ICD-10-CM

## 2023-09-11 RX ORDER — SEMAGLUTIDE 1 MG/.5ML
INJECTION, SOLUTION SUBCUTANEOUS
Qty: 2 ML | Refills: 0 | OUTPATIENT
Start: 2023-09-11

## 2023-09-11 NOTE — TELEPHONE ENCOUNTER
Patient has been sent [employer] message as this medication was sent out within the past 1-2 weeks.     Please check to see if he was able to fill it.    Thanks,  Escobar Perry PA-C on 9/11/2023 at 10:50 AM

## 2023-09-12 NOTE — TELEPHONE ENCOUNTER
Called patient and he stated he is pretty sure he received the prescription that was sent on 8/28. Patient is at work and is going to check with wife tonight and call back if needed.

## 2023-09-28 ENCOUNTER — OFFICE VISIT (OUTPATIENT)
Dept: FAMILY MEDICINE | Facility: OTHER | Age: 43
End: 2023-09-28
Payer: COMMERCIAL

## 2023-09-28 VITALS
HEIGHT: 72 IN | OXYGEN SATURATION: 96 % | RESPIRATION RATE: 22 BRPM | WEIGHT: 315 LBS | HEART RATE: 74 BPM | SYSTOLIC BLOOD PRESSURE: 126 MMHG | BODY MASS INDEX: 42.66 KG/M2 | TEMPERATURE: 99.1 F | DIASTOLIC BLOOD PRESSURE: 74 MMHG

## 2023-09-28 DIAGNOSIS — E66.01 CLASS 3 SEVERE OBESITY DUE TO EXCESS CALORIES WITHOUT SERIOUS COMORBIDITY WITH BODY MASS INDEX (BMI) OF 45.0 TO 49.9 IN ADULT (H): ICD-10-CM

## 2023-09-28 DIAGNOSIS — E66.813 CLASS 3 SEVERE OBESITY DUE TO EXCESS CALORIES WITHOUT SERIOUS COMORBIDITY WITH BODY MASS INDEX (BMI) OF 45.0 TO 49.9 IN ADULT (H): ICD-10-CM

## 2023-09-28 DIAGNOSIS — E83.52 SERUM CALCIUM ELEVATED: ICD-10-CM

## 2023-09-28 LAB
ANION GAP SERPL CALCULATED.3IONS-SCNC: 12 MMOL/L (ref 7–15)
BUN SERPL-MCNC: 17.9 MG/DL (ref 6–20)
CALCIUM SERPL-MCNC: 11.1 MG/DL (ref 8.6–10)
CHLORIDE SERPL-SCNC: 106 MMOL/L (ref 98–107)
CREAT SERPL-MCNC: 0.88 MG/DL (ref 0.67–1.17)
EGFRCR SERPLBLD CKD-EPI 2021: >90 ML/MIN/1.73M2
GLUCOSE SERPL-MCNC: 86 MG/DL (ref 70–99)
HCO3 SERPL-SCNC: 22 MMOL/L (ref 22–29)
POTASSIUM SERPL-SCNC: 4.3 MMOL/L (ref 3.4–5.3)
SODIUM SERPL-SCNC: 140 MMOL/L (ref 135–145)

## 2023-09-28 PROCEDURE — 99214 OFFICE O/P EST MOD 30 MIN: CPT | Performed by: PHYSICIAN ASSISTANT

## 2023-09-28 PROCEDURE — 80048 BASIC METABOLIC PNL TOTAL CA: CPT | Performed by: PHYSICIAN ASSISTANT

## 2023-09-28 PROCEDURE — 82306 VITAMIN D 25 HYDROXY: CPT | Performed by: PHYSICIAN ASSISTANT

## 2023-09-28 PROCEDURE — 36415 COLL VENOUS BLD VENIPUNCTURE: CPT | Performed by: PHYSICIAN ASSISTANT

## 2023-09-28 NOTE — PROGRESS NOTES
Assessment & Plan     Class 3 severe obesity due to excess calories without serious comorbidity with body mass index (BMI) of 45.0 to 49.9 in adult (H)  Patient has been doing well on the wegovy. He feels that the medication has been making a difference in helping reduce cravings. He does admit that he continues to drink regular soda and will go out to eat. Encouraged him to work on improving his diet and increasing his exercise as the medication will only do so much.   - Semaglutide-Weight Management (WEGOVY) 1.7 MG/0.75ML pen; Inject 1.7 mg Subcutaneous once a week  - Basic metabolic panel  (Ca, Cl, CO2, Creat, Gluc, K, Na, BUN); Future  - Basic metabolic panel  (Ca, Cl, CO2, Creat, Gluc, K, Na, BUN)    Serum calcium elevated  Metabolic panel returned with elevated calcium. This has been abnormal in the past and patient was instructed on PTH labs. However, he has not had these completed. I will have him do this in the next month and will reach out when results become available.   - **Vitamin D Deficiency FUTURE 2mo      Escobar Perry PA-C  [facility] BRISEIDA Quintana is a 43 year old, presenting for the following health issues:  Recheck Medication      9/28/2023     3:45 PM   Additional Questions   Roomed by Cynthia RUTH   Accompanied by self     Offered the Flu vaccine and he did decline today just FYI.    History of Present Illness       Hypertension: He presents for follow up of hypertension.  He does not check blood pressure  regularly outside of the clinic. Outpatient blood pressures have not been over 140/90. He follows a low salt diet.     Reason for visit:  Follow-up on Wegovy medication    He eats 2-3 servings of fruits and vegetables daily.He consumes 1 sweetened beverage(s) daily.He exercises with enough effort to increase his heart rate 10 to 19 minutes per day.  He exercises with enough effort to increase his heart rate 4 days per week.   He is taking medications  regularly.         Medication Followup of Wegovy  Taking Medication as prescribed: yes  Side Effects:  None  Medication Helping Symptoms:  yes - seems to be working just not as fast as he thought it would    Review of Systems   Constitutional, HEENT, cardiovascular, pulmonary, gi and gu systems are negative, except as otherwise noted.      Objective    /74   Pulse 74   Temp 99.1  F (37.3  C) (Temporal)   Resp 22   Ht 1.829 m (6')   Wt (!) 179.6 kg (396 lb)   SpO2 96%   BMI 53.71 kg/m    Body mass index is 53.71 kg/m .  Physical Exam   GENERAL: healthy, alert, no distress, and obese  EYES: Eyes grossly normal to inspection, PERRL and conjunctivae and sclerae normal  HENT: ear canals and TM's normal, nose and mouth without ulcers or lesions  NECK: no adenopathy, no asymmetry, masses, or scars and thyroid normal to palpation  RESP: lungs clear to auscultation - no rales, rhonchi or wheezes  CV: regular rate and rhythm, normal S1 S2, no S3 or S4, no murmur, click or rub, no peripheral edema and peripheral pulses strong  MS: no gross musculoskeletal defects noted, no edema  PSYCH: mentation appears normal, affect normal/bright    Results for orders placed or performed in visit on 09/28/23   Basic metabolic panel  (Ca, Cl, CO2, Creat, Gluc, K, Na, BUN)     Status: Abnormal   Result Value Ref Range    Sodium 140 135 - 145 mmol/L    Potassium 4.3 3.4 - 5.3 mmol/L    Chloride 106 98 - 107 mmol/L    Carbon Dioxide (CO2) 22 22 - 29 mmol/L    Anion Gap 12 7 - 15 mmol/L    Urea Nitrogen 17.9 6.0 - 20.0 mg/dL    Creatinine 0.88 0.67 - 1.17 mg/dL    GFR Estimate >90 >60 mL/min/1.73m2    Calcium 11.1 (H) 8.6 - 10.0 mg/dL    Glucose 86 70 - 99 mg/dL

## 2023-09-29 DIAGNOSIS — E83.52 SERUM CALCIUM ELEVATED: Primary | ICD-10-CM

## 2023-09-29 LAB — VIT D+METAB SERPL-MCNC: 17 NG/ML (ref 20–50)

## 2023-10-23 DIAGNOSIS — E66.813 CLASS 3 SEVERE OBESITY DUE TO EXCESS CALORIES WITHOUT SERIOUS COMORBIDITY WITH BODY MASS INDEX (BMI) OF 45.0 TO 49.9 IN ADULT (H): ICD-10-CM

## 2023-10-23 DIAGNOSIS — E66.01 CLASS 3 SEVERE OBESITY DUE TO EXCESS CALORIES WITHOUT SERIOUS COMORBIDITY WITH BODY MASS INDEX (BMI) OF 45.0 TO 49.9 IN ADULT (H): ICD-10-CM

## 2023-11-25 DIAGNOSIS — I10 ESSENTIAL HYPERTENSION: ICD-10-CM

## 2023-11-27 RX ORDER — AMLODIPINE BESYLATE 10 MG/1
10 TABLET ORAL DAILY
Qty: 90 TABLET | Refills: 0 | Status: SHIPPED | OUTPATIENT
Start: 2023-11-27 | End: 2024-02-22

## 2023-11-27 RX ORDER — LISINOPRIL 40 MG/1
40 TABLET ORAL DAILY
Qty: 90 TABLET | Refills: 0 | Status: SHIPPED | OUTPATIENT
Start: 2023-11-27 | End: 2024-02-22

## 2023-12-29 DIAGNOSIS — E66.813 CLASS 3 SEVERE OBESITY DUE TO EXCESS CALORIES WITHOUT SERIOUS COMORBIDITY WITH BODY MASS INDEX (BMI) OF 45.0 TO 49.9 IN ADULT (H): Primary | ICD-10-CM

## 2023-12-29 DIAGNOSIS — E66.01 CLASS 3 SEVERE OBESITY DUE TO EXCESS CALORIES WITHOUT SERIOUS COMORBIDITY WITH BODY MASS INDEX (BMI) OF 45.0 TO 49.9 IN ADULT (H): Primary | ICD-10-CM

## 2024-02-13 ENCOUNTER — OFFICE VISIT (OUTPATIENT)
Dept: ORTHOPEDICS | Facility: CLINIC | Age: 44
End: 2024-02-13
Payer: COMMERCIAL

## 2024-02-13 VITALS
SYSTOLIC BLOOD PRESSURE: 140 MMHG | TEMPERATURE: 98 F | WEIGHT: 315 LBS | BODY MASS INDEX: 42.66 KG/M2 | HEIGHT: 72 IN | DIASTOLIC BLOOD PRESSURE: 80 MMHG

## 2024-02-13 DIAGNOSIS — M17.11 PRIMARY OSTEOARTHRITIS OF RIGHT KNEE: Primary | ICD-10-CM

## 2024-02-13 PROCEDURE — 20610 DRAIN/INJ JOINT/BURSA W/O US: CPT | Mod: RT | Performed by: PHYSICIAN ASSISTANT

## 2024-02-13 PROCEDURE — 99213 OFFICE O/P EST LOW 20 MIN: CPT | Mod: 25 | Performed by: PHYSICIAN ASSISTANT

## 2024-02-13 RX ORDER — TRIAMCINOLONE ACETONIDE 40 MG/ML
80 INJECTION, SUSPENSION INTRA-ARTICULAR; INTRAMUSCULAR
Status: SHIPPED | OUTPATIENT
Start: 2024-02-13

## 2024-02-13 RX ORDER — MELOXICAM 15 MG/1
15 TABLET ORAL DAILY
Qty: 30 TABLET | Refills: 1 | Status: SHIPPED | OUTPATIENT
Start: 2024-02-13 | End: 2024-04-25

## 2024-02-13 RX ORDER — BUPIVACAINE HYDROCHLORIDE 5 MG/ML
3 INJECTION, SOLUTION PERINEURAL
Status: SHIPPED | OUTPATIENT
Start: 2024-02-13

## 2024-02-13 RX ADMIN — TRIAMCINOLONE ACETONIDE 80 MG: 40 INJECTION, SUSPENSION INTRA-ARTICULAR; INTRAMUSCULAR at 16:56

## 2024-02-13 RX ADMIN — BUPIVACAINE HYDROCHLORIDE 3 ML: 5 INJECTION, SOLUTION PERINEURAL at 16:56

## 2024-02-13 ASSESSMENT — PAIN SCALES - GENERAL: PAINLEVEL: MODERATE PAIN (5)

## 2024-02-13 NOTE — LETTER
2/13/2024         RE: Dontrell Schulz  [address] [address]        Dear Colleague,    Thank you for referring your patient, Dontrell Schulz, to the [facility]. Please see a copy of my visit note below.    Office Visit-Follow up    Chief Complaint: Dontrell Schulz is a 43 year old male who is being seen for   Chief Complaint   Patient presents with     RECHECK     Right knee primary osteoarthritis, moderate to severe lateral and mild patellofemoral.       History of Present Illness:   Patient is here for follow-up steroid injection.  Patient was last seen on 8/29/2023 and at that time received a Synvisc 1 injection into the right knee which lasted about 4 months.  Prior to that on 6/2/2023 he had a right steroid injection which lasted until the Synvisc 1 injections were at least 2-1/2 months.  At his last visit he was looking into a offloading brace but he did not get that..  He is holding off on Mobic and Voltaren gel which we asked about on the last visit    Physical Exam:  Vitals: BP (!) 140/80 (BP Location: Right arm, Cuff Size: Adult Large)   Temp 98  F (36.7  C) (Temporal)   Ht 1.829 m (6')   Wt (!) 182.3 kg (402 lb)   BMI 54.52 kg/m    BMI= Body mass index is 54.52 kg/m .  Constitutional: healthy, alert and no acute distress   Psychiatric: mentation appears normal and affect normal/bright  NEURO: no focal deficits, CMS intact right lower extremity   RESP: Normal with easy respirations and no use of accessory muscles to breathe, no audible wheezing or retractions  CV: Calf soft and nontender to palpation, leg warm   SKIN: No erythema, rashes, excoriation, or breakdown. No evidence of infection.   MUSCULOSKELETAL:  INSPECTION of right knee: No gross deformities, erythema, edema, ecchymosis, atrophy or fasciculations.   PALPATION: No tenderness on palpation of the medial, lateral, anterior and posterior portion of the knee. No specific joint line tenderness. No  increased warmth.  No effusion.   ROM: Able to flex and extend without any catching or locking or pain.  STRENGTH: Able to get on the exam table and off without any problems and able to ambulate without any problems.  SPECIAL TEST: None today.   GAIT: non-antalgic  Lymph: no palpable lymph nodes    Impression: 1. Right knee primary osteoarthritis, moderate to severe lateral and mild patellofemoral.        Plan:    Large Joint Injection/Arthocentesis: R knee joint    Date/Time: 2/13/2024 4:56 PM    Performed by: Caden Mcnally PA-C  Authorized by: Caden Mcnally PA-C    Indications:  Pain  Needle Size:  22 G  Guidance: landmark guided    Approach:  Anterolateral  Location:  Knee      Medications:  80 mg triamcinolone 40 MG/ML; 3 mL BUPivacaine 0.5 %  Aspirate amount (mL):  0  Procedure discussed: discussed risks, benefits, and alternatives    Consent Given by:  Patient  Timeout: timeout called immediately prior to procedure    Prep: patient was prepped and draped in usual sterile fashion     The skin was prepped with betadine. The patient was in a seated position. I used jennifer chloride spray prior to doing the injection.  The patient tolerated the injection well, and there were no complications. The injection site was covered with a Band-Aid.     FOCUSED PLAN:    Patient did get a good response from the Synvisc 1 injection in the right knee on 8/29/2023.  This did last him almost 4-1/2 months.  Patient never did get an offloading brace.  He is interested in a steroid injection which we proceeded with on his right knee today.  Patient did want to try Mobic and Voltaren gel as a backup when his injections wear off.  Patient also has been trying to lose weight but had to get off of his medication for that and is trying to get back on on the lower dose.  Patient can follow-up on an as-needed basis.    Re-x-ray on return: No      This note was dictated with [employer].    Caden Mcnally PA-C        Again, thank you for  allowing me to participate in the care of your patient.        Sincerely,        Caden Mcnally PA-C

## 2024-02-13 NOTE — PROGRESS NOTES
Office Visit-Follow up    Chief Complaint: Dontrell Schulz is a 43 year old male who is being seen for   Chief Complaint   Patient presents with    RECHECK     Right knee primary osteoarthritis, moderate to severe lateral and mild patellofemoral.       History of Present Illness:   Patient is here for follow-up steroid injection.  Patient was last seen on 8/29/2023 and at that time received a Synvisc 1 injection into the right knee which lasted about 4 months.  Prior to that on 6/2/2023 he had a right steroid injection which lasted until the Synvisc 1 injections were at least 2-1/2 months.  At his last visit he was looking into a offloading brace but he did not get that..  He is holding off on Mobic and Voltaren gel which we asked about on the last visit    Physical Exam:  Vitals: BP (!) 140/80 (BP Location: Right arm, Cuff Size: Adult Large)   Temp 98  F (36.7  C) (Temporal)   Ht 1.829 m (6')   Wt (!) 182.3 kg (402 lb)   BMI 54.52 kg/m    BMI= Body mass index is 54.52 kg/m .  Constitutional: healthy, alert and no acute distress   Psychiatric: mentation appears normal and affect normal/bright  NEURO: no focal deficits, CMS intact right lower extremity   RESP: Normal with easy respirations and no use of accessory muscles to breathe, no audible wheezing or retractions  CV: Calf soft and nontender to palpation, leg warm   SKIN: No erythema, rashes, excoriation, or breakdown. No evidence of infection.   MUSCULOSKELETAL:  INSPECTION of right knee: No gross deformities, erythema, edema, ecchymosis, atrophy or fasciculations.   PALPATION: No tenderness on palpation of the medial, lateral, anterior and posterior portion of the knee. No specific joint line tenderness. No increased warmth.  No effusion.   ROM: Able to flex and extend without any catching or locking or pain.  STRENGTH: Able to get on the exam table and off without any problems and able to ambulate without any problems.  SPECIAL TEST: None today.   GAIT:  non-antalgic  Lymph: no palpable lymph nodes    Impression: 1. Right knee primary osteoarthritis, moderate to severe lateral and mild patellofemoral.        Plan:    Large Joint Injection/Arthocentesis: R knee joint    Date/Time: 2/13/2024 4:56 PM    Performed by: Caden Mcnally PA-C  Authorized by: Caden Mcnally PA-C    Indications:  Pain  Needle Size:  22 G  Guidance: landmark guided    Approach:  Anterolateral  Location:  Knee      Medications:  80 mg triamcinolone 40 MG/ML; 3 mL BUPivacaine 0.5 %  Aspirate amount (mL):  0  Procedure discussed: discussed risks, benefits, and alternatives    Consent Given by:  Patient  Timeout: timeout called immediately prior to procedure    Prep: patient was prepped and draped in usual sterile fashion     The skin was prepped with betadine. The patient was in a seated position. I used jennifer chloride spray prior to doing the injection.  The patient tolerated the injection well, and there were no complications. The injection site was covered with a Band-Aid.     FOCUSED PLAN:    Patient did get a good response from the Synvisc 1 injection in the right knee on 8/29/2023.  This did last him almost 4-1/2 months.  Patient never did get an offloading brace.  He is interested in a steroid injection which we proceeded with on his right knee today.  Patient did want to try Mobic and Voltaren gel as a backup when his injections wear off.  Patient also has been trying to lose weight but had to get off of his medication for that and is trying to get back on on the lower dose.  Patient can follow-up on an as-needed basis.    Re-x-ray on return: No      This note was dictated with [employer].    Caden Mcnally PA-C

## 2024-02-20 ENCOUNTER — MYC MEDICAL ADVICE (OUTPATIENT)
Dept: FAMILY MEDICINE | Facility: OTHER | Age: 44
End: 2024-02-20
Payer: COMMERCIAL

## 2024-02-20 DIAGNOSIS — E66.813 CLASS 3 SEVERE OBESITY DUE TO EXCESS CALORIES WITHOUT SERIOUS COMORBIDITY WITH BODY MASS INDEX (BMI) OF 45.0 TO 49.9 IN ADULT (H): ICD-10-CM

## 2024-02-20 DIAGNOSIS — E66.01 CLASS 3 SEVERE OBESITY DUE TO EXCESS CALORIES WITHOUT SERIOUS COMORBIDITY WITH BODY MASS INDEX (BMI) OF 45.0 TO 49.9 IN ADULT (H): ICD-10-CM

## 2024-02-22 DIAGNOSIS — I10 ESSENTIAL HYPERTENSION: ICD-10-CM

## 2024-02-22 RX ORDER — AMLODIPINE BESYLATE 10 MG/1
10 TABLET ORAL DAILY
Qty: 90 TABLET | Refills: 0 | Status: SHIPPED | OUTPATIENT
Start: 2024-02-22 | End: 2024-04-02

## 2024-02-22 RX ORDER — LISINOPRIL 40 MG/1
40 TABLET ORAL DAILY
Qty: 90 TABLET | Refills: 0 | Status: SHIPPED | OUTPATIENT
Start: 2024-02-22 | End: 2024-04-02

## 2024-02-27 NOTE — TELEPHONE ENCOUNTER
Patient is requesting to decrease his dose of Wegovy to 0.5 mg.     Alivia GONGN, RN  [facility]

## 2024-02-29 NOTE — TELEPHONE ENCOUNTER
I spoke to Mariano today. Advised about refill being sent to pharmacy on file.     He states that he will have to call back to schedule follow up visit with Escobar Perry.

## 2024-03-15 DIAGNOSIS — E66.01 CLASS 3 SEVERE OBESITY DUE TO EXCESS CALORIES WITHOUT SERIOUS COMORBIDITY WITH BODY MASS INDEX (BMI) OF 45.0 TO 49.9 IN ADULT (H): ICD-10-CM

## 2024-03-15 DIAGNOSIS — E66.813 CLASS 3 SEVERE OBESITY DUE TO EXCESS CALORIES WITHOUT SERIOUS COMORBIDITY WITH BODY MASS INDEX (BMI) OF 45.0 TO 49.9 IN ADULT (H): ICD-10-CM

## 2024-03-15 RX ORDER — SEMAGLUTIDE 0.5 MG/.5ML
INJECTION, SOLUTION SUBCUTANEOUS
Qty: 2 ML | Refills: 0 | Status: SHIPPED | OUTPATIENT
Start: 2024-03-15 | End: 2024-04-29 | Stop reason: DRUGHIGH

## 2024-03-19 ENCOUNTER — TELEPHONE (OUTPATIENT)
Dept: FAMILY MEDICINE | Facility: OTHER | Age: 44
End: 2024-03-19
Payer: COMMERCIAL

## 2024-03-19 NOTE — TELEPHONE ENCOUNTER
Patient Quality Outreach    Patient is due for the following:   Hypertension -  BP check    Next Steps:   Patient has upcoming appointment, these items will be addressed at that time.    Type of outreach:    Chart review performed, no outreach needed.      Questions for provider review:    None           Cynthia Fink, CMA

## 2024-03-28 ENCOUNTER — MYC MEDICAL ADVICE (OUTPATIENT)
Dept: FAMILY MEDICINE | Facility: OTHER | Age: 44
End: 2024-03-28
Payer: COMMERCIAL

## 2024-04-02 ENCOUNTER — OFFICE VISIT (OUTPATIENT)
Dept: FAMILY MEDICINE | Facility: OTHER | Age: 44
End: 2024-04-02
Payer: COMMERCIAL

## 2024-04-02 VITALS
HEIGHT: 72 IN | SYSTOLIC BLOOD PRESSURE: 128 MMHG | HEART RATE: 71 BPM | RESPIRATION RATE: 18 BRPM | DIASTOLIC BLOOD PRESSURE: 78 MMHG | BODY MASS INDEX: 42.66 KG/M2 | OXYGEN SATURATION: 97 % | WEIGHT: 315 LBS | TEMPERATURE: 98.7 F

## 2024-04-02 DIAGNOSIS — I10 ESSENTIAL HYPERTENSION: ICD-10-CM

## 2024-04-02 DIAGNOSIS — E66.01 MORBID OBESITY (H): ICD-10-CM

## 2024-04-02 DIAGNOSIS — G37.3 TRANSVERSE MYELITIS (H): ICD-10-CM

## 2024-04-02 LAB
ANION GAP SERPL CALCULATED.3IONS-SCNC: 9 MMOL/L (ref 7–15)
BUN SERPL-MCNC: 18.3 MG/DL (ref 6–20)
CALCIUM SERPL-MCNC: 11.2 MG/DL (ref 8.6–10)
CHLORIDE SERPL-SCNC: 104 MMOL/L (ref 98–107)
CREAT SERPL-MCNC: 0.9 MG/DL (ref 0.67–1.17)
DEPRECATED HCO3 PLAS-SCNC: 25 MMOL/L (ref 22–29)
EGFRCR SERPLBLD CKD-EPI 2021: >90 ML/MIN/1.73M2
GLUCOSE SERPL-MCNC: 85 MG/DL (ref 70–99)
POTASSIUM SERPL-SCNC: 4.8 MMOL/L (ref 3.4–5.3)
SODIUM SERPL-SCNC: 138 MMOL/L (ref 135–145)

## 2024-04-02 PROCEDURE — 99214 OFFICE O/P EST MOD 30 MIN: CPT | Performed by: PHYSICIAN ASSISTANT

## 2024-04-02 PROCEDURE — 80048 BASIC METABOLIC PNL TOTAL CA: CPT | Performed by: PHYSICIAN ASSISTANT

## 2024-04-02 PROCEDURE — 36415 COLL VENOUS BLD VENIPUNCTURE: CPT | Performed by: PHYSICIAN ASSISTANT

## 2024-04-02 RX ORDER — AMLODIPINE BESYLATE 10 MG/1
10 TABLET ORAL DAILY
Qty: 90 TABLET | Refills: 3 | Status: SHIPPED | OUTPATIENT
Start: 2024-04-02

## 2024-04-02 RX ORDER — LISINOPRIL 40 MG/1
40 TABLET ORAL DAILY
Qty: 90 TABLET | Refills: 3 | Status: SHIPPED | OUTPATIENT
Start: 2024-04-02

## 2024-04-02 NOTE — PROGRESS NOTES
Assessment & Plan     Essential hypertension  BP is well controlled at 128/78 today. Patient has been tolerating his antihypertensive medication without adverse effect. He is planning to increase exercise as the weather warms. Updating labs today, refill sent to the pharmacy.   - amLODIPine (NORVASC) 10 MG tablet; Take 1 tablet (10 mg) by mouth daily  - lisinopril (ZESTRIL) 40 MG tablet; Take 1 tablet (40 mg) by mouth daily  - Basic metabolic panel  (Ca, Cl, CO2, Creat, Gluc, K, Na, BUN); Future  - Basic metabolic panel  (Ca, Cl, CO2, Creat, Gluc, K, Na, BUN)    Transverse myelitis (H)  This occurred ~8-9 years ago. No recurrence since. No new concerns.     Morbid obesity (H)  Patient informs me that he had been receiving benefit from the wegovy and that he was eating less and losing weight. Unfortunately, insurance coverage was abruptly cut off this past winter and patient has not been able to fill the medication. As such any weight loss he had achieved has since returned. He informs me that his spouse is waiting to hear back from the insurance provider regarding coverage. If the GLP medications are no longer covered at all can consider topamax.     BMI  Estimated body mass index is 53.78 kg/m  as calculated from the following:    Height as of this encounter: 1.829 m (6').    Weight as of this encounter: 179.9 kg (396 lb 8 oz).   Weight management plan: Discussed healthy diet and exercise guidelines      Baldo Quintana is a 44 year old, presenting for the following health issues:  Recheck Medication      4/2/2024     4:37 PM   Additional Questions   Roomed by Cynthia RUTH   Accompanied by self     History of Present Illness       Hypertension: He presents for follow up of hypertension.  He does check blood pressure  regularly outside of the clinic. Outpatient blood pressures have not been over 140/90. He follows a low salt diet.     He eats 2-3 servings of fruits and vegetables daily.He consumes 1 sweetened  beverage(s) daily.He exercises with enough effort to increase his heart rate 10 to 19 minutes per day.  He exercises with enough effort to increase his heart rate 4 days per week.   He is taking medications regularly.     Just FYI he hasn't had the Wegovy recently because the [facility] never filled it, he was going to mention to you but he can also check with the pharmacy.      Review of Systems  Constitutional, HEENT, cardiovascular, pulmonary, gi and gu systems are negative, except as otherwise noted.      Objective    /78   Pulse 71   Temp 98.7  F (37.1  C) (Temporal)   Resp 18   Ht 1.829 m (6')   Wt (!) 179.9 kg (396 lb 8 oz)   SpO2 97%   BMI 53.78 kg/m    Body mass index is 53.78 kg/m .  Physical Exam   GENERAL: alert and no distress  EYES: Eyes grossly normal to inspection, PERRL and conjunctivae and sclerae normal  HENT: ear canals and TM's normal, nose and mouth without ulcers or lesions  NECK: no adenopathy, no asymmetry, masses, or scars  RESP: lungs clear to auscultation - no rales, rhonchi or wheezes  CV: regular rate and rhythm, normal S1 S2, no S3 or S4, no murmur, click or rub, no peripheral edema  MS: no gross musculoskeletal defects noted, no edema  PSYCH: mentation appears normal, affect normal/bright    Results for orders placed or performed in visit on 04/02/24   Basic metabolic panel  (Ca, Cl, CO2, Creat, Gluc, K, Na, BUN)     Status: Abnormal   Result Value Ref Range    Sodium 138 135 - 145 mmol/L    Potassium 4.8 3.4 - 5.3 mmol/L    Chloride 104 98 - 107 mmol/L    Carbon Dioxide (CO2) 25 22 - 29 mmol/L    Anion Gap 9 7 - 15 mmol/L    Urea Nitrogen 18.3 6.0 - 20.0 mg/dL    Creatinine 0.90 0.67 - 1.17 mg/dL    GFR Estimate >90 >60 mL/min/1.73m2    Calcium 11.2 (H) 8.6 - 10.0 mg/dL    Glucose 85 70 - 99 mg/dL           Signed Electronically by: Escobar Perry PA-C

## 2024-04-03 DIAGNOSIS — E83.52 HYPERCALCEMIA: Primary | ICD-10-CM

## 2024-04-09 ENCOUNTER — MYC MEDICAL ADVICE (OUTPATIENT)
Dept: ORTHOPEDICS | Facility: CLINIC | Age: 44
End: 2024-04-09
Payer: COMMERCIAL

## 2024-04-09 DIAGNOSIS — M17.11 PRIMARY OSTEOARTHRITIS OF RIGHT KNEE: Primary | ICD-10-CM

## 2024-04-09 NOTE — TELEPHONE ENCOUNTER
"Patient was seen by TYE Gamez on 2/13/24. The plan from this visit was as follows:    \"Patient did get a good response from the Synvisc 1 injection in the right knee on 8/29/2023.  This did last him almost 4-1/2 months.  Patient never did get an offloading brace.  He is interested in a steroid injection which we proceeded with on his right knee today.  Patient did want to try Mobic and Voltaren gel as a backup when his injections wear off.  Patient also has been trying to lose weight but had to get off of his medication for that and is trying to get back on on the lower dose.  Patient can follow-up on an as-needed basis\"    Patient would like to proceed with another Synvisc injection at this time. Order has been pended. Please review and advise.     Betzaida Lombardo RN   [facility]    "

## 2024-04-10 NOTE — TELEPHONE ENCOUNTER
He should be good for 1 year from the last approval and that was Aug of 2023.  I replied to him.  Please reach out to this  patient and help him set up an apt.  Thank you

## 2024-04-12 NOTE — TELEPHONE ENCOUNTER
Future Appointments 4/12/2024 - 10/9/2024        Date Visit Type Length Department Provider     4/15/2024  4:20 PM RETURN KNEE 20 min PH ORTHOPEDIC SURGERY Caden Mcnally PA-C    Location Instructions:     From [address]: Exit at [address] on [place] side of [place]. Turn right on [address]. Turn left at stoplight on [address].&nbsp;  [facility] will be in view two blocks ahead. Please follow the signage to utilize the  [facility]  entrance and parking lot when entering campus.              4/19/2024  8:00 AM MYCHART LAB VISIT 15 min PH LAB CLINIC [facility]                    Appt scheduled, patient would like to be seen now in April for visit.

## 2024-04-19 ENCOUNTER — LAB (OUTPATIENT)
Dept: LAB | Facility: CLINIC | Age: 44
End: 2024-04-19
Payer: COMMERCIAL

## 2024-04-19 DIAGNOSIS — E83.52 HYPERCALCEMIA: ICD-10-CM

## 2024-04-19 LAB
CA-I BLD-MCNC: 5.8 MG/DL (ref 4.4–5.2)
TSH SERPL DL<=0.005 MIU/L-ACNC: 0.64 UIU/ML (ref 0.3–4.2)

## 2024-04-19 PROCEDURE — 36415 COLL VENOUS BLD VENIPUNCTURE: CPT

## 2024-04-19 PROCEDURE — 84443 ASSAY THYROID STIM HORMONE: CPT

## 2024-04-19 PROCEDURE — 82330 ASSAY OF CALCIUM: CPT

## 2024-04-19 PROCEDURE — 83970 ASSAY OF PARATHORMONE: CPT

## 2024-04-20 DIAGNOSIS — E21.3 HYPERPARATHYROIDISM (H): Primary | ICD-10-CM

## 2024-04-20 LAB — PTH-INTACT SERPL-MCNC: 72 PG/ML (ref 15–65)

## 2024-04-22 NOTE — TELEPHONE ENCOUNTER
"FUTURE VISIT INFORMATION      FUTURE VISIT INFORMATION:  Date: 6/24/24  Time: 3:20 PM  [place] [facility] [place]  REFERRAL INFORMATION:  Referring provider:  Escobar Perry PA-C   Referring providers clinic:  [facility]   Reason for visit/diagnosis:  E21.3 (ICD-10-CM) - Hyperparathyroidism (H24) Referral from Escobar Perry, Referral notes in EPIC. [place] [facility] [place]     RECORDS REQUESTED FROM      Clinic name Comments Records Status Imaging Status   [facility]  4/20/24 referral  4/2/24 OV with Escobar Perry PA-C     Lab:  4/19/24 Parathyroid, TSH Epic    [facility] 7/10/19 MR brain  7/10/19 MRA neck Epic PACS           \"Please notify/message CSS if patient completed outside imaging prior to scheduled appointment and/or any outside records that might have been missed at pre visit -Thank you\"  "

## 2024-04-25 DIAGNOSIS — M17.11 PRIMARY OSTEOARTHRITIS OF RIGHT KNEE: ICD-10-CM

## 2024-04-25 NOTE — TELEPHONE ENCOUNTER
REFILL REQUEST     Last Written Prescription Date:  2/13/24  Last Fill Quantity: 30,  # refills: 1   Last office visit with prescribing provider: 2/13/2024    Future Office Visit:  None scheduled, patient to follow-up as needed    Betzaida Lombardo RN   [facility]

## 2024-04-25 NOTE — TELEPHONE ENCOUNTER
Routing refill request to provider for review/approval because:  Labs out of range.     Requested Prescriptions   Pending Prescriptions Disp Refills    meloxicam (MOBIC) 15 MG tablet 30 tablet 1     Sig: Take 1 tablet (15 mg) by mouth daily       NSAID Medications Failed - 4/25/2024  1:09 PM        Failed - Normal ALT on file in past 12 months     Recent Labs   Lab Test 07/10/19  1159   ALT 51             Failed - Normal AST on file in past 12 months     Recent Labs   Lab Test 07/10/19  1159   AST 26             Failed - Normal CBC on file in past 12 months     Recent Labs   Lab Test 07/10/19  1159   WBC 5.9   RBC 5.07   HGB 15.4   HCT 45.7                    Failed - Always Fail Criteria - Chart Review Required     Validate Diagnosis. If the medication is requested for an acute flare of a chronic pain associated with a musculoskeletal or rheumatologic condition; okay to authorize if all other criteria are met. If not, then forward to provider for review.          Passed - Blood pressure under 140/90 in past 12 months     BP Readings from Last 3 Encounters:   04/02/24 128/78   02/13/24 (!) 140/80   09/28/23 126/74       No data recorded            Passed - Patient is age 6-64 years        Passed - Medication is active on med list        Passed - Normal GFR on file in past 12 months     Recent Labs   Lab Test 04/02/24  1703 11/28/22  0849 03/01/21  1114   GFRESTIMATED >90   < > >90   GFRESTBLACK  --   --  >90    < > = values in this interval not displayed.             Passed - Recent (12 mo) or future (90 days) visit within the authorizing provider's specialty     The patient must have completed an in-person or virtual visit within the past 12 months or has a future visit scheduled within the next 90 days with the authorizing provider s specialty.  Urgent care and e-visits do not quality as an office visit for this protocol.          Passed - Normal serum creatinine on file in past 12 months     Recent Labs    Lab Test 04/02/24  1703   CR 0.90                      Betzaida Lombardo RN   [facility]

## 2024-04-26 RX ORDER — MELOXICAM 15 MG/1
15 TABLET ORAL DAILY
Qty: 30 TABLET | Refills: 1 | Status: SHIPPED | OUTPATIENT
Start: 2024-04-26 | End: 2024-08-09

## 2024-04-26 NOTE — TELEPHONE ENCOUNTER
I reviewed this patient's chart.  I saw them for osteoarthritis.  Patient's GFR/kidney function is greater than 90.  I am okay with a refill and I sent it.

## 2024-04-27 ENCOUNTER — MYC MEDICAL ADVICE (OUTPATIENT)
Dept: FAMILY MEDICINE | Facility: OTHER | Age: 44
End: 2024-04-27
Payer: COMMERCIAL

## 2024-04-27 DIAGNOSIS — E66.01 MORBID OBESITY (H): Primary | ICD-10-CM

## 2024-05-13 ENCOUNTER — TELEPHONE (OUTPATIENT)
Dept: OTOLARYNGOLOGY | Facility: CLINIC | Age: 44
End: 2024-05-13
Payer: COMMERCIAL

## 2024-05-13 DIAGNOSIS — E21.3 HYPERPARATHYROIDISM (H): Primary | ICD-10-CM

## 2024-05-13 NOTE — TELEPHONE ENCOUNTER
Left Voicemail (1st Attempt) for the patient to call back and schedule the following:    Appointment type: NM Parathyroid Scan  Provider: Ordered by Dr. Caba  Return date: Next available   Specialty phone number: [phone]  Additional appointment(s) needed:   Additonal Notes: Schedule in [place]

## 2024-05-30 ENCOUNTER — MYC REFILL (OUTPATIENT)
Dept: FAMILY MEDICINE | Facility: OTHER | Age: 44
End: 2024-05-30
Payer: COMMERCIAL

## 2024-05-30 DIAGNOSIS — E66.01 MORBID OBESITY (H): ICD-10-CM

## 2024-06-06 ENCOUNTER — HOSPITAL ENCOUNTER (OUTPATIENT)
Dept: NUCLEAR MEDICINE | Facility: CLINIC | Age: 44
Setting detail: NUCLEAR MEDICINE
Discharge: HOME | End: 2024-06-06
Attending: STUDENT IN AN ORGANIZED HEALTH CARE EDUCATION/TRAINING PROGRAM
Payer: COMMERCIAL

## 2024-06-06 DIAGNOSIS — E21.3 HYPERPARATHYROIDISM (H): ICD-10-CM

## 2024-06-06 PROCEDURE — 78072 PARATHYRD PLANAR W/SPECT&CT: CPT

## 2024-06-06 PROCEDURE — 78072 PARATHYRD PLANAR W/SPECT&CT: CPT | Mod: 26 | Performed by: RADIOLOGY

## 2024-06-06 PROCEDURE — 343N000001 HC RX 343: Performed by: STUDENT IN AN ORGANIZED HEALTH CARE EDUCATION/TRAINING PROGRAM

## 2024-06-06 PROCEDURE — A9500 TC99M SESTAMIBI: HCPCS | Performed by: STUDENT IN AN ORGANIZED HEALTH CARE EDUCATION/TRAINING PROGRAM

## 2024-06-06 PROCEDURE — A9516 IODINE I-123 SOD IODIDE MIC: HCPCS | Performed by: STUDENT IN AN ORGANIZED HEALTH CARE EDUCATION/TRAINING PROGRAM

## 2024-06-06 RX ADMIN — Medication 25 MILLICURIE: at 10:22

## 2024-06-06 RX ADMIN — Medication 654 MICROCURIE: at 07:23

## 2024-06-13 ENCOUNTER — MYC MEDICAL ADVICE (OUTPATIENT)
Dept: OTOLARYNGOLOGY | Facility: CLINIC | Age: 44
End: 2024-06-13
Payer: COMMERCIAL

## 2024-06-13 DIAGNOSIS — E21.3 HYPERPARATHYROIDISM (H): Primary | ICD-10-CM

## 2024-06-14 NOTE — TELEPHONE ENCOUNTER
Called and spoke to patient regarding the following NM parathyroid scan from 6/6/24:     Impression: Negative for parathyroid adenoma. Consider 4D CT of the  neck as sometimes these lesions are apparent on that modality.    Patient is scheduled for clinic appointment with Dr. Caba on 6/24/24 and lab appointment scheduled prior to this appointment to check albumin, calcium, parathyroid hormone, vitamin D, and phosphorus. Patient agreeable to this plan and had no further questions or concerns.     Gina Escobedo, RN, BSN  RN Care Coordinator, ENT Clinic

## 2024-06-16 ENCOUNTER — HEALTH MAINTENANCE LETTER (OUTPATIENT)
Age: 44
End: 2024-06-16

## 2024-06-24 ENCOUNTER — LAB (OUTPATIENT)
Dept: LAB | Facility: CLINIC | Age: 44
End: 2024-06-24
Payer: COMMERCIAL

## 2024-06-24 ENCOUNTER — PRE VISIT (OUTPATIENT)
Dept: OTOLARYNGOLOGY | Facility: CLINIC | Age: 44
End: 2024-06-24

## 2024-06-24 ENCOUNTER — OFFICE VISIT (OUTPATIENT)
Dept: OTOLARYNGOLOGY | Facility: CLINIC | Age: 44
End: 2024-06-24
Payer: COMMERCIAL

## 2024-06-24 VITALS
BODY MASS INDEX: 42.66 KG/M2 | HEIGHT: 72 IN | SYSTOLIC BLOOD PRESSURE: 146 MMHG | HEART RATE: 75 BPM | WEIGHT: 315 LBS | OXYGEN SATURATION: 96 % | DIASTOLIC BLOOD PRESSURE: 86 MMHG

## 2024-06-24 DIAGNOSIS — E21.3 HYPERPARATHYROIDISM (H): ICD-10-CM

## 2024-06-24 DIAGNOSIS — E21.3 HYPERPARATHYROIDISM (H): Primary | ICD-10-CM

## 2024-06-24 LAB
ALBUMIN SERPL BCG-MCNC: 4.6 G/DL (ref 3.5–5.2)
CALCIUM SERPL-MCNC: 11.5 MG/DL (ref 8.6–10)
PHOSPHATE SERPL-MCNC: 2.3 MG/DL (ref 2.5–4.5)
PTH-INTACT SERPL-MCNC: 159 PG/ML (ref 15–65)
VIT D+METAB SERPL-MCNC: 23 NG/ML (ref 20–50)

## 2024-06-24 PROCEDURE — 82040 ASSAY OF SERUM ALBUMIN: CPT | Performed by: PATHOLOGY

## 2024-06-24 PROCEDURE — 84100 ASSAY OF PHOSPHORUS: CPT | Performed by: PATHOLOGY

## 2024-06-24 PROCEDURE — 36415 COLL VENOUS BLD VENIPUNCTURE: CPT | Performed by: PATHOLOGY

## 2024-06-24 PROCEDURE — 99204 OFFICE O/P NEW MOD 45 MIN: CPT | Mod: 25 | Performed by: STUDENT IN AN ORGANIZED HEALTH CARE EDUCATION/TRAINING PROGRAM

## 2024-06-24 PROCEDURE — 99000 SPECIMEN HANDLING OFFICE-LAB: CPT | Performed by: PATHOLOGY

## 2024-06-24 PROCEDURE — 82306 VITAMIN D 25 HYDROXY: CPT | Performed by: STUDENT IN AN ORGANIZED HEALTH CARE EDUCATION/TRAINING PROGRAM

## 2024-06-24 PROCEDURE — 82310 ASSAY OF CALCIUM: CPT | Performed by: PATHOLOGY

## 2024-06-24 PROCEDURE — 83970 ASSAY OF PARATHORMONE: CPT | Performed by: PATHOLOGY

## 2024-06-24 PROCEDURE — 31575 DIAGNOSTIC LARYNGOSCOPY: CPT | Performed by: STUDENT IN AN ORGANIZED HEALTH CARE EDUCATION/TRAINING PROGRAM

## 2024-06-24 ASSESSMENT — PAIN SCALES - GENERAL: PAINLEVEL: MILD PAIN (2)

## 2024-06-24 NOTE — LETTER
6/24/2024       RE: Dontrell Schulz  [address] [facility] [address]     Dear Colleague,    Thank you for referring your patient, Dontrell Schulz, to the [facility] [place] at [facility]. Please see a copy of my visit note below.    Head and Neck Surgery  6/24/24    Mr Schulz presents today for evaluation of primary hyperparathyroidism.    This was discovered on routine blood work.  He has no history of nephrolithiasis and has not had a DEXA scan.  He does endorse fatigue and mood swings as well as bone and joint pain.  He has no family history of hyperparathyroidism.    His most recent labs from today show calcium elevated to 11.5 parathyroid hormone level elevated to 159, low phosphorus, normal vitamin D.     He underwent a nuclear medicine parathyroid scan on June 6 and no adenoma was seen.    Past medical history:  Hypertension    Past surgical history:  Eye surgery for retinal detachment    Medications:  Amlodipine  Lisinopril  Semaglutide    Allergies:  No known drug allergies    Social history:  Former smoker quit in 2021  Occasional alcohol use    Family history:  None    Physical examination:  Alert in no acute distress  No palpable cervical adenopathy or thyroid masses  No lesions seen in the oral cavity or oropharynx    Procedure:  Fiberoptic laryngoscopy was performed showing normal vocal cord mobility    Ultrasound performed today no obvious adenoma was seen    Assessment plan:  44-year-old male with laboratory evaluation consistent with primary hyperparathyroidism.  Will obtain a 24-hour urine calcium and creatinine to rule out FHH.    His nuclear medicine parathyroid scan did not localize and I do not see an obvious adenoma my ultrasound today.  Will obtain a 4D CT and see if we can localize the adenoma.    Aquilino Caba MD    45 minutes spent on the date of the encounter in chart review, patient visit,  review of tests, documentation and/or discussion with other providers about the issues documented above aside from time spent doing flexible laryngoscopy.

## 2024-06-26 ENCOUNTER — ANCILLARY PROCEDURE (OUTPATIENT)
Dept: CT IMAGING | Facility: CLINIC | Age: 44
End: 2024-06-26
Attending: STUDENT IN AN ORGANIZED HEALTH CARE EDUCATION/TRAINING PROGRAM
Payer: COMMERCIAL

## 2024-06-26 DIAGNOSIS — E21.3 HYPERPARATHYROIDISM (H): ICD-10-CM

## 2024-06-26 PROCEDURE — 70492 CT SFT TSUE NCK W/O & W/DYE: CPT | Mod: GC | Performed by: RADIOLOGY

## 2024-06-26 RX ORDER — IOPAMIDOL 755 MG/ML
70 INJECTION, SOLUTION INTRAVASCULAR ONCE
Status: COMPLETED | OUTPATIENT
Start: 2024-06-26 | End: 2024-06-26

## 2024-06-26 RX ADMIN — IOPAMIDOL 70 ML: 755 INJECTION, SOLUTION INTRAVASCULAR at 10:20

## 2024-06-27 NOTE — PROGRESS NOTES
Head and Neck Surgery  6/24/24    Mr Schulz presents today for evaluation of primary hyperparathyroidism.    This was discovered on routine blood work.  He has no history of nephrolithiasis and has not had a DEXA scan.  He does endorse fatigue and mood swings as well as bone and joint pain.  He has no family history of hyperparathyroidism.    His most recent labs from today show calcium elevated to 11.5 parathyroid hormone level elevated to 159, low phosphorus, normal vitamin D.     He underwent a nuclear medicine parathyroid scan on June 6 and no adenoma was seen.    Past medical history:  Hypertension    Past surgical history:  Eye surgery for retinal detachment    Medications:  Amlodipine  Lisinopril  Semaglutide    Allergies:  No known drug allergies    Social history:  Former smoker quit in 2021  Occasional alcohol use    Family history:  None    Physical examination:  Alert in no acute distress  No palpable cervical adenopathy or thyroid masses  No lesions seen in the oral cavity or oropharynx    Procedure:  Fiberoptic laryngoscopy was performed showing normal vocal cord mobility    Ultrasound performed today no obvious adenoma was seen    Assessment plan:  44-year-old male with laboratory evaluation consistent with primary hyperparathyroidism.  Will obtain a 24-hour urine calcium and creatinine to rule out FHH.    His nuclear medicine parathyroid scan did not localize and I do not see an obvious adenoma my ultrasound today.  Will obtain a 4D CT and see if we can localize the adenoma.    Aquilino Caba MD    45 minutes spent on the date of the encounter in chart review, patient visit, review of tests, documentation and/or discussion with other providers about the issues documented above aside from time spent doing flexible laryngoscopy.

## 2024-07-01 LAB
CALCIUM 24H UR-MRATE: 0.43 G/SPEC (ref 0.1–0.3)
CALCIUM UR-MCNC: 29.9 MG/DL
COLLECT DURATION TIME UR: 24 H
COLLECT DURATION TIME UR: 24 H
CREAT 24H UR-MRATE: 2.31 G/SPEC (ref 0.98–2.2)
CREAT UR-MCNC: 159 MG/DL
SPECIMEN VOL UR: 1450 ML
SPECIMEN VOL UR: 1450 ML

## 2024-07-01 PROCEDURE — 81050 URINALYSIS VOLUME MEASURE: CPT | Performed by: STUDENT IN AN ORGANIZED HEALTH CARE EDUCATION/TRAINING PROGRAM

## 2024-07-01 PROCEDURE — 99000 SPECIMEN HANDLING OFFICE-LAB: CPT | Performed by: PATHOLOGY

## 2024-07-01 PROCEDURE — 82570 ASSAY OF URINE CREATININE: CPT | Performed by: STUDENT IN AN ORGANIZED HEALTH CARE EDUCATION/TRAINING PROGRAM

## 2024-07-07 ENCOUNTER — MYC REFILL (OUTPATIENT)
Dept: FAMILY MEDICINE | Facility: OTHER | Age: 44
End: 2024-07-07
Payer: COMMERCIAL

## 2024-07-07 DIAGNOSIS — E66.01 MORBID OBESITY (H): ICD-10-CM

## 2024-07-08 ENCOUNTER — TELEPHONE (OUTPATIENT)
Dept: FAMILY MEDICINE | Facility: OTHER | Age: 44
End: 2024-07-08

## 2024-07-08 DIAGNOSIS — E66.01 MORBID OBESITY (H): Primary | ICD-10-CM

## 2024-07-08 NOTE — TELEPHONE ENCOUNTER
Semaglutide-Weight Management (WEGOVY) 1.7 MG/0.75ML pen     Prior Authorization Retail Medication Request    Medication/Dose: Semaglutide-Weight Management (WEGOVY) 1.7 MG/0.75ML pen  Diagnosis and ICD code (if different than what is on RX):    New/renewal/insurance change PA/secondary ins. PA:  Previously Tried and Failed:    Rationale:      Insurance   Primary:   Insurance ID:      Secondary (if applicable):  Insurance ID:      Pharmacy Information (if different than what is on RX)  Name:    Phone:    Fax:    Key: [id number]

## 2024-07-08 NOTE — LETTER
7/15/2024    INSURER: Payor: [employer] / Plan: [employer] OPEN ACCESS / Product Type: HMO /   ATTN: [employer]   Re: Prior Authorization Request  Patient: Dontrell Schulz  Policy ID#:  No Subscriber Number on File  : [id number]      To Whom it May Concern:    I am writing to formally request a prior authorization of coverage for my patient,  Dontrell Schulz, for treatment using wegovy/semaglutide.  I am requesting authorization for applicable provider professional and facility services associated with this therapy.    You had already approved the patient's use of the medication for the past 13 months and he has been slowly titrating up to the recommended therapeutic dose as per the manufacturers instructions. His most recent prescription, sent out on 2024 was denied on basis of excluded benefit.     It is not clear as to why you would suddenly deny coverage for this medication when the patient has had approved coverage previously and has been showing benefit with weight loss of 30+lbs over the past year of use.     The benefits of the therapy include weight loss and reduced risk for associated chronic health conditions with excess body weight. This includes heart disease, kidney disease, diabetes, liver disease and osteoarthritis.     As stated, Dontrell Schulz has been approved for use of wegovy/semaglutide since 2023 and has been compliant with this therapy over the past year.     I firmly believe that this therapy is clinically appropriate Dontrell Schulz to continue per the  instructions. The cost of allowing the patient to continue to receive wegovy in order to maintain the loss of weight, reduction in chronic health conditions as well as improvements in his mental and emotional health significantly outweighs the potential future costs of addressing each of these conditions in the future. Please contact me at Dept: [phone] if you require additional  information to ensure the prompt approval for coverage.    Please send your written decision to me at this address:  [address]  [facility] [address] [phone]  Dept: [phone]    Sincerely,      JACQUES Navaosures

## 2024-07-11 ENCOUNTER — MYC MEDICAL ADVICE (OUTPATIENT)
Dept: FAMILY MEDICINE | Facility: OTHER | Age: 44
End: 2024-07-11
Payer: COMMERCIAL

## 2024-07-11 ENCOUNTER — TELEPHONE (OUTPATIENT)
Dept: OTOLARYNGOLOGY | Facility: CLINIC | Age: 44
End: 2024-07-11
Payer: COMMERCIAL

## 2024-07-11 NOTE — TELEPHONE ENCOUNTER
I have new health insurance provider that started on July 1st. The pharmacy needs another prior authorization done. I just wanted to make sure you were aware why. Thank you!

## 2024-07-11 NOTE — TELEPHONE ENCOUNTER
Pt has two insurance plans listed. Which one is the new insurance that requires pa?  [facility] or [facility]

## 2024-07-12 NOTE — TELEPHONE ENCOUNTER
Patient replied via [employer]. See other encounter. Ok to send [employer] with any info for patient.

## 2024-07-15 NOTE — TELEPHONE ENCOUNTER
Letter printed at station 4. Please fax to insurer.     Escobar Coyle PA-C on 7/15/2024 at 12:58 PM

## 2024-07-15 NOTE — TELEPHONE ENCOUNTER
This is a new insurance plan for pt. Does provider want to update the letter of medical necessity or send as it is?

## 2024-07-15 NOTE — TELEPHONE ENCOUNTER
PRIOR AUTHORIZATION DENIED    Medication: WEGOVY 1.7 MG/0.75ML SC SOAJ  Insurance Company: [employer] - Phone [phone] Fax [phone]  Denial Date: 7/15/2024    Denial Reason(s): excluded benefit    Appeal Information: If provider would like to appeal please provide a letter of medical necessity.

## 2024-07-15 NOTE — TELEPHONE ENCOUNTER
Central Prior Authorization Team  Phone: [phone]    PA Initiation    Medication: WEGOVY 1.7 MG/0.75ML SC SOAJ  Insurance Company: [employer] - Phone [phone] Fax [phone]  Pharmacy Filling the Rx: [id number] - [place], [place] - [address] [place] [address]  Filling Pharmacy Phone: [phone]  Filling Pharmacy Fax: [phone]  Start Date: 7/15/2024

## 2024-07-29 ENCOUNTER — OFFICE VISIT (OUTPATIENT)
Dept: OTOLARYNGOLOGY | Facility: CLINIC | Age: 44
End: 2024-07-29
Payer: COMMERCIAL

## 2024-07-29 ENCOUNTER — PREP FOR PROCEDURE (OUTPATIENT)
Dept: OTOLARYNGOLOGY | Facility: CLINIC | Age: 44
End: 2024-07-29

## 2024-07-29 VITALS
HEIGHT: 72 IN | WEIGHT: 315 LBS | HEART RATE: 69 BPM | TEMPERATURE: 98.4 F | OXYGEN SATURATION: 97 % | SYSTOLIC BLOOD PRESSURE: 125 MMHG | BODY MASS INDEX: 42.66 KG/M2 | DIASTOLIC BLOOD PRESSURE: 89 MMHG

## 2024-07-29 DIAGNOSIS — E21.3 HYPERPARATHYROIDISM (H): Primary | ICD-10-CM

## 2024-07-29 PROCEDURE — 99214 OFFICE O/P EST MOD 30 MIN: CPT | Performed by: STUDENT IN AN ORGANIZED HEALTH CARE EDUCATION/TRAINING PROGRAM

## 2024-07-29 ASSESSMENT — PAIN SCALES - GENERAL: PAINLEVEL: MILD PAIN (2)

## 2024-07-29 NOTE — NURSING NOTE
Chief Complaint   Patient presents with    RECHECK     Follow up     Blood pressure 125/89, pulse 69, temperature 98.4  F (36.9  C), height 1.829 m (6'), weight (!) 174.2 kg (384 lb), SpO2 97%.  Sergio Soliz LPN

## 2024-07-29 NOTE — LETTER
7/29/2024       RE: Dontrell Schulz  [address] [facility] [address]     Dear Colleague,    Thank you for referring your patient, Dontrell Schulz, to the [facility] E[facility] NOSE AND TH[facility]AT CLIN[facility] [place] at [facility]. Please see a copy of my visit note below.    [facility] head and Neck Surgery  7/29/24    Mr Schulz returns today for follow-up.  Is a pleasant 44-year-old male who I met for primary hyperparathyroidism.  His labs are consistent with a diagnosis as indication for surgery is the degree of elevation of his calcium.    His nuclear medicine parathyroid scan and 4D CT did not localize.    He returns today for repeat ultrasound.    Physical examination:  Alert in no acute distress  No palpable cervical adenopathy or thyroid masses    Ultrasound:  Ultrasound performed today.  II paid close attention to the area posterior left thyroid lobe I did not see an obvious adenoma.  On the right side I believe I see a 6 mm adenoma. He looks to have some thymic tissue inferiorly. I did not see an obvious adenoma on the left but at times thought I saw an anechoic structure in the left superior location    A/P:  44-year-old male with hyperparathyroidism.  His 4D CT and nuclear medicine parathyroid scan did not definitively localize.     He returns today for repeat ultrasound and discussion of next apps.  On his ultrasound today I am suspicious of a right inferior parathyroid adenoma    We discussed options of continued observation versus parathyroidectomy.  He is aware of the possibility of not identifying a parathyroid adenoma intraoperatively particularly given the not definitive localization studies.  Given the ultrasound findings I will start on the right side.    We reviewed the risks including but not limited to infection, bleeding, return trips to the operating room, 1% risk of permanent recurrent laryngeal nerve injury, 5-10% risk of temporary  recurrent laryngeal nerve weakness.     He would like to proceed with surgery scheduling.    Aquilino Caba MD      30 minutes spent on the date of the encounter in chart review, patient visit, review of tests, documentation and/or discussion with other providers about the issues documented above      Again, thank you for allowing me to participate in the care of your patient.      Sincerely,    Aquilino Caba MD

## 2024-07-29 NOTE — PATIENT INSTRUCTIONS
You were seen in the ENT Clinic today by Dr. Caba. If you have any questions or concerns after your appointment, please contact us (see below)    The following has been recommended for you based upon your appointment today:  Surgery schedulers will call you to schedule your surgery, pre op assessment with PAC, and post op follow up appointment     Please return to clinic 1 week after surgery for post op follow up with Dr. Caab     How to Contact Us:  Send a Gifi message to your provider. Our team will respond to you via Gifi. Occasionally, we will need to call you to get further information.  For urgent matters (Monday-Friday), call the ENT Clinic: [phone] and speak with a call center team member - they will route your call appropriately.   If you'd like to speak directly with a nurse, please find our contact information below. We do our best to check voicemail frequently throughout the day, and will work to call you back within 1-2 days. For urgent matters, please use the general clinic phone numbers listed above.    Gina WRIGHT RN, BSN  RN Care Coordinator, ENT Clinic  [facility]  Direct: [phone]           Surgery Instructions - Dr. Caba    PREPARING FOR SURGERY  You will need a preoperative assessment within 30 days of your surgery. This will be at our [place] [facility] and surgery schedulers will call you to schedule this appointment when they schedule the surgery.   Before surgery, call the clinic:   If there is any change in your health, or you are having more frequent or severe symptoms   If you develop a cold or flu, or if you test positive for COVID-19   If you have any questions about what to expect before, during, or after surgery   If you need assistance filling out paperwork for short-term disability or FMLA, or you need a letter excusing you from work       MEDICATIONS BEFORE SURGERY  You will receive specific instructions about how to take your medications  at your preoperative assessment visit. Talk to your care team about every medication that you take, including over-the-counter medications and supplements. Some medications can make you bleed too much during surgery, and some change how well anesthesia drugs work. Follow these general instructions for common medications, unless otherwise directed.     INSTRUCTIONS MEDICATION   Hold for 7 days before surgery  Supplements   Multivitamins   Aspirin (note: some medications can contain aspirin, like Mahnaz-Corning)  Naproxen (Aleve)  Ibuprofen (Advil, Motrin)  Ant weight loss medication      Talk with the Preoperative Assessment Center (PAC) about how to take these medications before surgery    Insulin or oral medications for diabetes   Blood pressure medications   Anticoagulant medications, including:    warfarin (Coumadin)   enoxaparin (Lovenox)   dabigatran (Pradaxa)   apixaban (Eliquis)   rivaroxaban (Xarelto)   Antiplatelet medications, including:   clopidogrel bisulfate (Plavix)   cilostazol (Pletal)      Okay to keep taking for pain, as needed    Acetaminophen (Tylenol)        EATING AND DRINKING BEFORE SURGERY  Eat and drink as usual until 8 hours before your surgery arrival time. After that, no food or milk.   Drink clear liquids until 1 hour before your surgery arrival time. Clear liquids are liquids you can see through, like water, Gatorade, and Propel. You may also have black coffee and tea (no cream or milk).   Nothing by mouth within 1 hour of your surgery arrival time. This includes gum, candy, and breath mints.   If your care team tells you take medicine on the morning of surgery, it is okay to take medicine with a sip of water.   Do not drink alcohol for at least 24 hours before surgery. Do not use marijuana for 7 days prior to surgery.      PREVENTING INFECTION  Shower or bathe the night before and morning of your surgery following the instructions on your handout,  Showering Before Surgery.    Note: Only  use the special antiseptic soap from your neck to your toes. Your care team will clean the skin at your surgery site.   Don t shave or clip hair near your surgery site. We ll remove the hair if needed.   Having diabetes and/or smoking can cause delayed wound healing and increase your risk of a surgical site infection. Quitting smoking and lowering your blood sugars can make a big difference. If you would like support with this, please let us know or work with your primary care provider.        SMOKING AND NICOTINE  Don t smoke or vape the morning of surgery. You may chew nicotine gum up to 2 hours before surgery. A nicotine patch is okay.   We strongly recommend quitting smoking and other tobacco products. Nicotine can cause delayed healing and wound complications after surgery.       PLANNING AHEAD - FINANCES  [email or web address]  [facility] Billing: Please call [phone], if you wish to pay a bill.  [facility] Financial Counselors: Please call [phone], if you have questions about possible costs and coverage or to discuss options if you don't have enough - or no - insurance for your care.  [facility] Cost of Care Estimates: Please visit the website to view our online estimate tool. If you have additional questions, call [phone] for estimates.  Our financial team will contact your insurance company as soon as surgery is scheduled. If your insurance company requires a prior authorization (pre-approval), our financial team will complete these necessary steps. Typically, we don t encounter many issues with insurance denying coverage for skull base surgery.    It is a good idea to call your insurance company and let them know you re having surgery. Ask questions about your deductible, co-insurance, and what of out-of-pocket costs you will be responsible for.       HEALTHCARE DIRECTIVE  Consider preparing a Health Care Directive and  choosing a Health Care Agent. A Health Care Directive is a written plan outlining your values and priorities for your future medical treatment. A Health Care Agent makes health care decisions based on your wishes if you are unable to communicate.   Download a document, information materials or register for a free class on advance care planning and creating a health care directive by visiting [email or web address], calling [phone], or emailing [email or web address].   If you have a health care directive or choose to complete a healthcare directive before surgery, please upload the document to [employer]. This will be attached to your chart. You may also want to bring a copy with you on the day of surgery.       YOUR SURGERY DAY  Parking:   Both self-park and  parking are available at the [facility] and Surgery Center buildings. If the Patient Visitors Ramp is full or if a patient or visitor has limited mobility, please follow the signs to the  located at the main entrance. For more information, please visit: [email or web address]    What to bring:  Photo ID and insurance card   Copy of your healthcare directive (if you have one)   Glasses with case (you can t wear contacts during surgery)   If you have a pacemaker, ICD (defibrillator) or other implant, please bring the ID card   If you have an implanted stimulator, please bring the remote control   If you have a legal guardian, bring a copy of the certified (court-stamped) guardianship papers   What to leave at home:  Please remove any jewelry, including body piercings   Leave jewelry and other valuables at home        HOME RECOVERY  Everyone's recovery is different based on their health condition, age, and overall health status.   Plan to have an adult stay with you for at least 24 hours after you get home from the hospital.   Arrange for help at home. It is common to feel tired for the first few  weeks (maybe months) and you will need to avoid some activities. Many people find that having someone help with household tasks, such as cleaning, cooking, and running errands is helpful.         ACTIVITY RESTRICTIONS  Avoid strenuous exercise and activity for 3-4 weeks.   Do not lift anything greater than 10 pounds (about a gallon of milk).      PAIN MANAGEMENT  It is normal to have some pain after surgery. Most people do not experience severe pain. If you are experiencing severe pain at the incision site or severe headaches, please let us know right away.  You will usually be sent home with a small amount of narcotic pain medication. You should gradually reduce the amount of pain medication that you take as your pain improves.  Avoid ibuprofen (Advil) or naproxen (Aleve) immediately after surgery, unless your surgeon tells you this is okay to take.   It is okay to take acetaminophen (Tylenol) for pain. You can take Tylenol with the narcotic pain medication, and some people find benefit in alternating between the narcotic pain medication and Tylenol. (Example: 1 tablet of oxycodone at 8:00 am, 1 tablet of Tylenol at 10:00 am).      DIET  A well-balanced diet is important for your recovery.   Try to eat plenty of fiber (fruits, whole grains, beans, and vegetables can be good sources of fiber) to help prevent constipation.   It is important to stay hydrated after surgery to prevent dehydration and help with bowel movements. Drink plenty of water throughout the day.      CALL THE CLINIC IF YOU EXPERIENCE:  Drainage, swelling, fluid collection, or increased redness around the incision   Fever, or temperature of 101 degrees or higher   Pain not managed by your pain medication   Severe constipation or abdominal pain  Running low on prescription medication that was prescribed to you at the time of surgery   Any other symptoms that you have questions about      After clinic hours or on the weekends, please call the  hospital  at [phone] and ask to speak with the ENT resident on call. If you have a serious emergency, call 911 or come to the emergency room. If you can, come to the hospital where you had your surgery.     During clinic hours:   Department  Phone    Ear, Nose, & Throat (ENT)    [phone]     RN Care Coordinators:  We do our best to check voicemail frequently throughout the day. For urgent matters, please use the general clinic phone number listed above. Your call will be routed appropriately.     Gina WRIGHT RN, BSN   RN Care Coordinator ENT    Direct: [phone]

## 2024-07-29 NOTE — PROGRESS NOTES
Teaching Flowsheet - ENT  Relevant Diagnosis: Hyperparathyroidism   Teaching Topic: Parathyroidectomy   Person(s) involved in teaching: Patient and spouse      Motivation Level: High  Asks Questions: Yes  Eager to Learn: Yes  Cooperative: Yes  Receptive (willing/able to accept information): Yes  Comments: Reviewed pre-op H and P, NPO prior to  surgery, pre-op scrub (will be mailed by surgery schedulers), reviewed post-op cares, activity and pain.     Patient demonstrates understanding of the following:  Reason for the appointment, diagnosis and treatment plan: Yes  Knowledge of proper use of medications and conditions for which they are ordered (with special attention to potential side effects or drug interactions): stop aspirin products 1 week before surgery Yes  Which situations necessitate calling provider and whom to contact: Yes  Nutritional needs and diet plan: Yes  Pain management techniques: Yes  Patient instructed on hand hygiene: Yes  How and/when to access community resources: Yes     Infection Prevention:  Patient demonstrates understanding of the following:  Surgical procedure site care taught: Yes  Signs and symptoms of infection taught: Yes  Wound care taught: Yes  Instructional Materials Used/Given: verbal instruction, AVS with surgery information     Gina Escobedo, RN, BSN  RN Care Coordinator, ENT Clinic

## 2024-07-30 NOTE — PROGRESS NOTES
[facility] head and Neck Surgery  7/29/24    Mr Schulz returns today for follow-up.  Is a pleasant 44-year-old male who I met for primary hyperparathyroidism.  His labs are consistent with a diagnosis as indication for surgery is the degree of elevation of his calcium.    His nuclear medicine parathyroid scan and 4D CT did not localize.    He returns today for repeat ultrasound.    Physical examination:  Alert in no acute distress  No palpable cervical adenopathy or thyroid masses    Ultrasound:  Ultrasound performed today.  II paid close attention to the area posterior left thyroid lobe I did not see an obvious adenoma.  On the right side I believe I see a 6 mm adenoma. He looks to have some thymic tissue inferiorly. I did not see an obvious adenoma on the left but at times thought I saw an anechoic structure in the left superior location    A/P:  44-year-old male with hyperparathyroidism.  His 4D CT and nuclear medicine parathyroid scan did not definitively localize.     He returns today for repeat ultrasound and discussion of next apps.  On his ultrasound today I am suspicious of a right inferior parathyroid adenoma    We discussed options of continued observation versus parathyroidectomy.  He is aware of the possibility of not identifying a parathyroid adenoma intraoperatively particularly given the not definitive localization studies.  Given the ultrasound findings I will start on the right side.    We reviewed the risks including but not limited to infection, bleeding, return trips to the operating room, 1% risk of permanent recurrent laryngeal nerve injury, 5-10% risk of temporary recurrent laryngeal nerve weakness.     He would like to proceed with surgery scheduling.    Aquilino Caba MD      30 minutes spent on the date of the encounter in chart review, patient visit, review of tests, documentation and/or discussion with other providers about the issues documented above

## 2024-08-07 ENCOUNTER — TELEPHONE (OUTPATIENT)
Dept: OTOLARYNGOLOGY | Facility: CLINIC | Age: 44
End: 2024-08-07
Payer: COMMERCIAL

## 2024-08-07 NOTE — TELEPHONE ENCOUNTER
Left message regarding scheduling surgery/procedure with Dr. Caba. Writer left call back number on the patients voicemail.      Lissy Lewis on 8/7/2024 at 11:20 AM   P: [phone]

## 2024-08-08 NOTE — TELEPHONE ENCOUNTER
Left patients wife a voicemail to schedule surgery for PARATHYROIDECTOMY (N/A) with Dr. Caba - Left Surgery Scheduling line for callback [phone]    Tayla Parker on 8/8/2024 at 10:16 AM

## 2024-08-08 NOTE — TELEPHONE ENCOUNTER
Scheduled surgery with Dr. Caba on 10/3/2024    Spoke with: Brigitte Schulz (Spouse)    Surgery is located at [facility]    Patient will be seen for their H&P by PAC on 9/20/2024 at 8am - Provided address & floor number    Does patient need a consult before upcoming surgery? No    Anesthesia type: General    Requested Imaging required for surgery: No    Patient is scheduled for their 1 week post op on 10/14/2024 at 420pm with Dr. Caba    Patient will receive their surgery packet via [employer] per their preference    Patient was not provided a start time for surgery & is aware they will receive this information at their PAC appointment as well as any pre-op instructions.    Additional comments: Patient was instructed to call back with any further questions or concerns.     Tayla Parker on 8/8/2024 at 10:43 AM

## 2024-08-09 DIAGNOSIS — M17.11 PRIMARY OSTEOARTHRITIS OF RIGHT KNEE: ICD-10-CM

## 2024-08-09 RX ORDER — MELOXICAM 15 MG/1
15 TABLET ORAL DAILY
Qty: 30 TABLET | Refills: 1 | Status: SHIPPED | OUTPATIENT
Start: 2024-08-09

## 2024-08-09 NOTE — TELEPHONE ENCOUNTER
Mobic  Last Written Prescription Date:  4/26/24  Last Fill Quantity: 30,  # refills: 1   Last office visit: 2/13/2024 with prescribing provider:     Future Office Visit:  8/12/24    Requested Prescriptions   Pending Prescriptions Disp Refills    meloxicam (MOBIC) 15 MG tablet 30 tablet 1     Sig: Take 1 tablet (15 mg) by mouth daily       NSAID Medications Failed - 8/9/2024  7:02 AM        Failed - Normal CBC on file in past 12 months     Recent Labs   Lab Test 07/10/19  1159   WBC 5.9   RBC 5.07   HGB 15.4   HCT 45.7                    Failed - Always Fail Criteria - Chart Review Required     Validate Diagnosis. If the medication is requested for an acute flare of a chronic pain associated with a musculoskeletal or rheumatologic condition; okay to authorize if all other criteria are met. If not, then forward to provider for review.          Passed - Blood pressure under 140/90 in past 12 months     BP Readings from Last 3 Encounters:   07/29/24 125/89   06/24/24 (!) 146/86   04/02/24 128/78       No data recorded            Passed - Patient is age 6-64 years        Passed - Medication is active on med list        Passed - Normal GFR on file in past 12 months     Recent Labs   Lab Test 04/02/24  1703 11/28/22  0849 03/01/21  1114   GFRESTIMATED >90   < > >90   GFRESTBLACK  --   --  >90    < > = values in this interval not displayed.             Passed - Recent (12 mo) or future (90 days) visit within the authorizing provider's specialty     The patient must have completed an in-person or virtual visit within the past 12 months or has a future visit scheduled within the next 90 days with the authorizing provider s specialty.  Urgent care and e-visits do not quality as an office visit for this protocol.                   Cynthia Skinner RN on 8/9/2024 at 8:20 AM

## 2024-08-09 NOTE — TELEPHONE ENCOUNTER
Meloxicam      Last Written Prescription Date:  04/26/24  Last Fill Quantity: 30,   # refills: 1  Last Office Visit: 02/13/2024  Future Office visit:

## 2024-08-09 NOTE — TELEPHONE ENCOUNTER
FUTURE VISIT INFORMATION      SURGERY INFORMATION:  Date: 10/3/24  Location: uu or  Surgeon:  Aquilino Caba MD   Anesthesia Type:  general  Procedure: PARATHYROIDECTOMY   Consult: ov 7/29/24    RECORDS REQUESTED FROM:       Primary Care Provider: Escobar Perry [facility]-C [facility]

## 2024-08-12 ENCOUNTER — OFFICE VISIT (OUTPATIENT)
Dept: ORTHOPEDICS | Facility: CLINIC | Age: 44
End: 2024-08-12
Payer: COMMERCIAL

## 2024-08-12 VITALS
BODY MASS INDEX: 42.66 KG/M2 | TEMPERATURE: 97.6 F | DIASTOLIC BLOOD PRESSURE: 80 MMHG | SYSTOLIC BLOOD PRESSURE: 145 MMHG | HEIGHT: 72 IN | WEIGHT: 315 LBS

## 2024-08-12 DIAGNOSIS — M17.11 PRIMARY OSTEOARTHRITIS OF RIGHT KNEE: Primary | ICD-10-CM

## 2024-08-12 PROCEDURE — 20610 DRAIN/INJ JOINT/BURSA W/O US: CPT | Mod: RT | Performed by: PHYSICIAN ASSISTANT

## 2024-08-12 RX ORDER — BUPIVACAINE HYDROCHLORIDE 5 MG/ML
3 INJECTION, SOLUTION PERINEURAL
Status: SHIPPED | OUTPATIENT
Start: 2024-08-12

## 2024-08-12 RX ORDER — TRIAMCINOLONE ACETONIDE 40 MG/ML
80 INJECTION, SUSPENSION INTRA-ARTICULAR; INTRAMUSCULAR
Status: SHIPPED | OUTPATIENT
Start: 2024-08-12

## 2024-08-12 RX ADMIN — BUPIVACAINE HYDROCHLORIDE 3 ML: 5 INJECTION, SOLUTION PERINEURAL at 16:30

## 2024-08-12 RX ADMIN — TRIAMCINOLONE ACETONIDE 80 MG: 40 INJECTION, SUSPENSION INTRA-ARTICULAR; INTRAMUSCULAR at 16:30

## 2024-08-12 ASSESSMENT — PAIN SCALES - GENERAL: PAINLEVEL: MILD PAIN (3)

## 2024-08-12 NOTE — LETTER
8/12/2024      Dontrell Schulz  [address] [address]      Dear Colleague,    Thank you for referring your patient, Dontrell Schulz, to the [facility]. Please see a copy of my visit note below.    Office Visit-Follow up    Chief Complaint: Dontrell Schulz is a 44 year old male who is being seen for   Chief Complaint   Patient presents with     RECHECK     1. Right knee primary osteoarthritis, moderate to severe lateral and mild patellofemoral.       History of Present Illness:   Patient is here for follow-up steroid injection.  Patient was last seen on 2/13/2024 and at that time he received a steroid injection to his right knee.  The injection lasted about 6 months.  At that visit we also checked in with him on his offloading brace which she never did get.  Patient was trying to lose weight at that time and also we prescribed him some Mobic and Voltaren gel for when the steroid injection wore off.  Patient did have good success with Synvisc 1 injection which was done on 8/29/2023 and lasted 4-1/2 months.    Physical Exam:  Vitals: BP (!) 145/80 (BP Location: Right arm, Patient Position: Sitting, Cuff Size: Adult Large)   Temp 97.6  F (36.4  C) (Temporal)   Ht 1.829 m (6')   Wt (!) 177.6 kg (391 lb 8 oz)   BMI 53.10 kg/m    BMI= Body mass index is 53.1 kg/m .  Constitutional: healthy, alert and no acute distress   Psychiatric: mentation appears normal and affect normal/bright  NEURO: no focal deficits, CMS intact right lower extremity   RESP: Normal with easy respirations and no use of accessory muscles to breathe, no audible wheezing or retractions  CV: Calf soft and nontender to palpation, leg warm   SKIN: No erythema, rashes, excoriation, or breakdown. No evidence of infection.   MUSCULOSKELETAL:  INSPECTION of right knee: No gross deformities, erythema, edema, ecchymosis, atrophy or fasciculations.   PALPATION: No tenderness on palpation of the medial, lateral,  anterior and posterior portion of the knee. No specific joint line tenderness. No increased warmth.  No effusion.   ROM: Able to flex and extend without any catching or locking or pain  STRENGTH: Able to get on the exam table and off without any problems and able to ambulate without any problems.  SPECIAL TEST: None today.   GAIT: non-antalgic  Lymph: no palpable lymph nodes    Impression: : 1. Right knee primary osteoarthritis, moderate to severe lateral and mild patellofemoral.       Plan:    Large Joint Injection/Arthocentesis: R knee joint    Date/Time: 8/12/2024 4:30 PM    Performed by: Caden Mcnally PA-C  Authorized by: Caden Mcnally PA-C    Indications:  Pain  Needle Size:  22 G  Guidance: landmark guided    Approach:  Anterolateral  Location:  Knee      Medications:  80 mg triamcinolone 40 MG/ML; 3 mL BUPivacaine 0.5 %  Aspirate amount (mL):  0  Procedure discussed: discussed risks, benefits, and alternatives    Consent Given by:  Patient  Timeout: timeout called immediately prior to procedure    Prep: patient was prepped and draped in usual sterile fashion     The skin was prepped with betadine. The patient was in a seated position. I used jennifer chloride spray prior to doing the injection.  The patient tolerated the injection well, and there were no complications. The injection site was covered with a Band-Aid.     FOCUSED PLAN:    The last steroid injection done on 2/13/2024 lasted about 6 months and is just starting to wear off now.  Patient has started taking the Mobic and also Voltaren and both of those medications are helping him.  Today we proceeded with another steroid injection into the right knee.  In the future another angle of treatment might be nutritionist for swelling to help with weight loss as that could help his knee out also.  Patient can follow-up on an as-needed basis.    Re-x-ray on return: No      This note was dictated with [employer].    Caden Mcnally PA-C        Again, thank  you for allowing me to participate in the care of your patient.        Sincerely,        Caden Mcnally PA-C

## 2024-08-12 NOTE — PROGRESS NOTES
Office Visit-Follow up    Chief Complaint: Dontrell Schulz is a 44 year old male who is being seen for   Chief Complaint   Patient presents with    RECHECK     1. Right knee primary osteoarthritis, moderate to severe lateral and mild patellofemoral.       History of Present Illness:   Patient is here for follow-up steroid injection.  Patient was last seen on 2/13/2024 and at that time he received a steroid injection to his right knee.  The injection lasted about 6 months.  At that visit we also checked in with him on his offloading brace which she never did get.  Patient was trying to lose weight at that time and also we prescribed him some Mobic and Voltaren gel for when the steroid injection wore off.  Patient did have good success with Synvisc 1 injection which was done on 8/29/2023 and lasted 4-1/2 months.    Physical Exam:  Vitals: BP (!) 145/80 (BP Location: Right arm, Patient Position: Sitting, Cuff Size: Adult Large)   Temp 97.6  F (36.4  C) (Temporal)   Ht 1.829 m (6')   Wt (!) 177.6 kg (391 lb 8 oz)   BMI 53.10 kg/m    BMI= Body mass index is 53.1 kg/m .  Constitutional: healthy, alert and no acute distress   Psychiatric: mentation appears normal and affect normal/bright  NEURO: no focal deficits, CMS intact right lower extremity   RESP: Normal with easy respirations and no use of accessory muscles to breathe, no audible wheezing or retractions  CV: Calf soft and nontender to palpation, leg warm   SKIN: No erythema, rashes, excoriation, or breakdown. No evidence of infection.   MUSCULOSKELETAL:  INSPECTION of right knee: No gross deformities, erythema, edema, ecchymosis, atrophy or fasciculations.   PALPATION: No tenderness on palpation of the medial, lateral, anterior and posterior portion of the knee. No specific joint line tenderness. No increased warmth.  No effusion.   ROM: Able to flex and extend without any catching or locking or pain  STRENGTH: Able to get on the exam table and off without  any problems and able to ambulate without any problems.  SPECIAL TEST: None today.   GAIT: non-antalgic  Lymph: no palpable lymph nodes    Impression: : 1. Right knee primary osteoarthritis, moderate to severe lateral and mild patellofemoral.       Plan:    Large Joint Injection/Arthocentesis: R knee joint    Date/Time: 8/12/2024 4:30 PM    Performed by: Caden Mcnally PA-C  Authorized by: Caden Mcnally PA-C    Indications:  Pain  Needle Size:  22 G  Guidance: landmark guided    Approach:  Anterolateral  Location:  Knee      Medications:  80 mg triamcinolone 40 MG/ML; 3 mL BUPivacaine 0.5 %  Aspirate amount (mL):  0  Procedure discussed: discussed risks, benefits, and alternatives    Consent Given by:  Patient  Timeout: timeout called immediately prior to procedure    Prep: patient was prepped and draped in usual sterile fashion     The skin was prepped with betadine. The patient was in a seated position. I used jennifer chloride spray prior to doing the injection.  The patient tolerated the injection well, and there were no complications. The injection site was covered with a Band-Aid.     FOCUSED PLAN:    The last steroid injection done on 2/13/2024 lasted about 6 months and is just starting to wear off now.  Patient has started taking the Mobic and also Voltaren and both of those medications are helping him.  Today we proceeded with another steroid injection into the right knee.  In the future another angle of treatment might be nutritionist for swelling to help with weight loss as that could help his knee out also.  Patient can follow-up on an as-needed basis.    Re-x-ray on return: No      This note was dictated with Massdrop.    Caden Mcnally PA-C

## 2024-09-19 LAB
ABO/RH(D): NORMAL
ANTIBODY SCREEN: NEGATIVE
SPECIMEN EXPIRATION DATE: NORMAL

## 2024-09-20 ENCOUNTER — OFFICE VISIT (OUTPATIENT)
Dept: SURGERY | Facility: CLINIC | Age: 44
End: 2024-09-20
Payer: COMMERCIAL

## 2024-09-20 ENCOUNTER — APPOINTMENT (OUTPATIENT)
Dept: LAB | Facility: CLINIC | Age: 44
End: 2024-09-20
Payer: COMMERCIAL

## 2024-09-20 ENCOUNTER — PRE VISIT (OUTPATIENT)
Dept: SURGERY | Facility: CLINIC | Age: 44
End: 2024-09-20

## 2024-09-20 ENCOUNTER — LAB (OUTPATIENT)
Dept: LAB | Facility: CLINIC | Age: 44
End: 2024-09-20
Payer: COMMERCIAL

## 2024-09-20 ENCOUNTER — ANESTHESIA EVENT (OUTPATIENT)
Dept: SURGERY | Facility: CLINIC | Age: 44
End: 2024-09-20
Payer: COMMERCIAL

## 2024-09-20 VITALS
SYSTOLIC BLOOD PRESSURE: 118 MMHG | RESPIRATION RATE: 16 BRPM | TEMPERATURE: 97.7 F | WEIGHT: 315 LBS | HEIGHT: 72 IN | DIASTOLIC BLOOD PRESSURE: 74 MMHG | OXYGEN SATURATION: 95 % | HEART RATE: 62 BPM | BODY MASS INDEX: 42.66 KG/M2

## 2024-09-20 DIAGNOSIS — E21.3 HYPERPARATHYROIDISM (H): ICD-10-CM

## 2024-09-20 DIAGNOSIS — Z01.818 PREOP EXAMINATION: Primary | ICD-10-CM

## 2024-09-20 DIAGNOSIS — Z01.818 PREOP EXAMINATION: ICD-10-CM

## 2024-09-20 LAB
ANION GAP SERPL CALCULATED.3IONS-SCNC: 9 MMOL/L (ref 7–15)
BUN SERPL-MCNC: 15.8 MG/DL (ref 6–20)
CALCIUM SERPL-MCNC: 10.8 MG/DL (ref 8.8–10.4)
CHLORIDE SERPL-SCNC: 106 MMOL/L (ref 98–107)
CREAT SERPL-MCNC: 0.82 MG/DL (ref 0.67–1.17)
EGFRCR SERPLBLD CKD-EPI 2021: >90 ML/MIN/1.73M2
ERYTHROCYTE [DISTWIDTH] IN BLOOD BY AUTOMATED COUNT: 13 % (ref 10–15)
GLUCOSE SERPL-MCNC: 91 MG/DL (ref 70–99)
HCO3 SERPL-SCNC: 22 MMOL/L (ref 22–29)
HCT VFR BLD AUTO: 43.9 % (ref 40–53)
HGB BLD-MCNC: 14.9 G/DL (ref 13.3–17.7)
MCH RBC QN AUTO: 30.1 PG (ref 26.5–33)
MCHC RBC AUTO-ENTMCNC: 33.9 G/DL (ref 31.5–36.5)
MCV RBC AUTO: 89 FL (ref 78–100)
PLATELET # BLD AUTO: 259 10E3/UL (ref 150–450)
POTASSIUM SERPL-SCNC: 4.3 MMOL/L (ref 3.4–5.3)
RBC # BLD AUTO: 4.95 10E6/UL (ref 4.4–5.9)
SODIUM SERPL-SCNC: 137 MMOL/L (ref 135–145)
WBC # BLD AUTO: 8 10E3/UL (ref 4–11)

## 2024-09-20 PROCEDURE — 99203 OFFICE O/P NEW LOW 30 MIN: CPT | Performed by: NURSE PRACTITIONER

## 2024-09-20 PROCEDURE — 36415 COLL VENOUS BLD VENIPUNCTURE: CPT | Performed by: PATHOLOGY

## 2024-09-20 PROCEDURE — 86900 BLOOD TYPING SEROLOGIC ABO: CPT

## 2024-09-20 PROCEDURE — 85027 COMPLETE CBC AUTOMATED: CPT | Performed by: PATHOLOGY

## 2024-09-20 PROCEDURE — 80048 BASIC METABOLIC PNL TOTAL CA: CPT | Performed by: PATHOLOGY

## 2024-09-20 ASSESSMENT — PAIN SCALES - GENERAL: PAINLEVEL: NO PAIN (0)

## 2024-09-20 ASSESSMENT — ENCOUNTER SYMPTOMS: ORTHOPNEA: 0

## 2024-09-20 ASSESSMENT — LIFESTYLE VARIABLES: TOBACCO_USE: 1

## 2024-09-20 NOTE — H&P
Pre-Operative H & P     CC:  Preoperative exam to assess for increased cardiopulmonary risk while undergoing surgery and anesthesia.    Date of Encounter: 9/20/2024  Primary Care Physician:  Escobar Perry     Reason for visit:   Encounter Diagnoses   Name Primary?    Preop examination Yes    Hyperparathyroidism (H24)        HPI  Dontrell Schulz is a 44 year old male who presents for pre-operative H & P in preparation for  Procedure Information       Case: [id number] Date/Time: 10/03/24 1130    Procedure: PARATHYROIDECTOMY (Neck)    Anesthesia type: General    Diagnosis: Hyperparathyroidism (H24) [E21.3]    Pre-op diagnosis: Hyperparathyroidism (H24) [E21.3]    Location:  OR 09 /  OR    Providers: Aquilino Caba MD            Dontrell Schulz is a 44 year old male with hypertension, right eye blindness (trauma), tobacco use, knee OA and morbid obesity that has hyperparathyroidism. He has had elevated calcium levels, elevated parathyroid hormone levels and and confirmed imaging.  Symptoms have included mood swings, bone and joint pain, and fatigue.  He was referred to Dr. Caba for surgical consultation and the above listed procedure has been recommended.     History is obtained from the patient and chart review    Hx of abnormal bleeding or anti-platelet use: none      Past Medical History  Past Medical History:   Diagnosis Date    Hyperparathyroidism (H24)     Hypertension     Legally blind in right eye, as defined in [place]     trauma in 2000    Morbid obesity (H)     Osteoarthritis of both knees, unspecified osteoarthritis type     Tobacco use        Past Surgical History  Past Surgical History:   Procedure Laterality Date    EYE SURGERY Right 2000    trauma related - Retinal injury.       Prior to Admission Medications  Current Outpatient Medications   Medication Sig Dispense Refill    amLODIPine (NORVASC) 10 MG tablet Take 1 tablet (10 mg) by mouth daily (Patient taking differently: Take 10 mg  by mouth every morning.) 90 tablet 3    cholecalciferol (VITAMIN D3) 25 mcg (1000 units) capsule Take 1 capsule by mouth every morning.      diclofenac (VOLTAREN) 1 % topical gel Apply 4 g topically 4 times daily 150 g 1    lisinopril (ZESTRIL) 40 MG tablet Take 1 tablet (40 mg) by mouth daily (Patient taking differently: Take 40 mg by mouth every morning.) 90 tablet 3    meloxicam (MOBIC) 15 MG tablet Take 1 tablet (15 mg) by mouth daily (Patient not taking: Reported on 9/20/2024) 30 tablet 1    order for DME Thigh High HEIDI stockings 1 each 1    Semaglutide-Weight Management (WEGOVY) 0.25 MG/0.5ML pen Inject 0.25 mg subcutaneously once a week. 2 mL 0       Allergies  Allergies   Allergen Reactions    Nkda [No Known Drug Allergy]        Social History  Social History     Socioeconomic History    Marital status:      Spouse name: Not on file    Number of children: 3    Years of education: Not on file    Highest education level: Not on file   Occupational History    Occupation: [employer]   Tobacco Use    Smoking status: Every Day     Current packs/day: 0.00     Average packs/day: 1 pack/day for 10.0 years (10.0 ttl pk-yrs)     Types: Cigarettes     Start date: 9/1/2011     Last attempt to quit: 9/1/2021     Years since quitting: 3.0    Smokeless tobacco: Never    Tobacco comments:     1-2 cigarettes daily   Vaping Use    Vaping status: Former    Substances: Flavoring    Devices: Pre-filled or refillable cartridge, Refillable tank   Substance and Sexual Activity    Alcohol use: Not Currently     Comment: occas    Drug use: No    Sexual activity: Yes     Partners: Female     Birth control/protection: I.U.D.   Other Topics Concern    Parent/sibling w/ CABG, MI or angioplasty before 65F 55M? No   Social History Narrative    Not on file     Social Determinants of Health     Financial Resource Strain: Low Risk  (9/28/2023)    Financial Resource Strain     Within the past 12 months, have you or your  family members you live with been unable to get utilities (heat, electricity) when it was really needed?: No   Food Insecurity: Low Risk  (9/28/2023)    Food Insecurity     Within the past 12 months, did you worry that your food would run out before you got money to buy more?: No     Within the past 12 months, did the food you bought just not last and you didn t have money to get more?: No   Transportation Needs: Low Risk  (9/28/2023)    Transportation Needs     Within the past 12 months, has lack of transportation kept you from medical appointments, getting your medicines, non-medical meetings or appointments, work, or from getting things that you need?: No   Physical Activity: Not on file   Stress: Not on file   Social Connections: Not on file   Interpersonal Safety: Low Risk  (4/2/2024)    Interpersonal Safety     Do you feel physically and emotionally safe where you currently live?: Yes     Within the past 12 months, have you been hit, slapped, kicked or otherwise physically hurt by someone?: No     Within the past 12 months, have you been humiliated or emotionally abused in other ways by your partner or ex-partner?: No   Housing Stability: Low Risk  (9/28/2023)    Housing Stability     Do you have housing? : Yes     Are you worried about losing your housing?: No       Family History  Family History   Problem Relation Age of Onset    No Known Problems Mother     No Known Problems Father     Anesthesia Reaction No family hx of     Thrombosis No family hx of        Review of Systems  The complete review of systems is negative other than noted in the HPI or here.   Anesthesia Evaluation   Pt has had prior anesthetic.     No history of anesthetic complications       ROS/MED HX  ENT/Pulmonary:     (+)     NORMA risk factors, snores loudly, hypertension, obese,        tobacco use, Current use,                    (-) recent URI   Neurologic:  - neg neurologic ROS     Cardiovascular:     (+)  hypertension- -   -  - -                                  No previous cardiac testing  (-) orthopnea/PND and syncope   METS/Exercise Tolerance: >4 METS Comment: Very physically active at his job and also on his property doing yard work, cutting wood, etc.     Hematologic:  - neg hematologic  ROS     Musculoskeletal:   (+)  arthritis,             GI/Hepatic:  - neg GI/hepatic ROS     Renal/Genitourinary:  - neg Renal ROS     Endo: Comment: hyperparathyroidism    (+)               Obesity,       Psychiatric/Substance Use:  - neg psychiatric ROS     Infectious Disease:  - neg infectious disease ROS     Malignancy:  - neg malignancy ROS     Other: Comment: Legally blind in right eye s/p prior trauma and surgery           /74 (BP Location: Left arm, Patient Position: Sitting, Cuff Size: Adult Large)   Pulse 62   Temp 97.7  F (36.5  C) (Oral)   Resp 16   Ht 1.829 m (6')   Wt (!) 178.3 kg (393 lb)   SpO2 95%   BMI 53.30 kg/m      Physical Exam   Constitutional: Awake, alert, cooperative, no apparent distress, and appears stated age.  Eyes: right eye/pupil abnormal.  Left is round and reactive to light, extra ocular muscles intact, sclera clear, conjunctiva normal.  HENT: Normocephalic, oral pharynx with moist mucus membranes, good dentition. No goiter appreciated.   Respiratory: Clear to auscultation bilaterally, no crackles or wheezing.  Cardiovascular: Regular rate and rhythm, normal S1 and S2, and no murmur noted.  Carotids +2, no bruits. No edema. Palpable pulses to radial  DP and PT arteries.   GI: Normal bowel sounds, soft, non-distended, non-tender, no masses palpated, no hepatosplenomegaly.    Lymph/Hematologic: No cervical lymphadenopathy and no supraclavicular lymphadenopathy.  Genitourinary:  deferred  Skin: Warm and dry.    Musculoskeletal: Full ROM of neck. There is no redness, warmth, or swelling of the exposed joints. Gross motor strength is normal.    Neurologic: Awake, alert, oriented to name, place and time. Cranial nerves  II-XII are grossly intact. Gait is normal.   Neuropsychiatric: Calm, cooperative. Normal affect.     Prior Labs/Diagnostic Studies   All labs and imaging personally reviewed     EKG/ stress test - if available please see in ROS above       The patient's records and results personally reviewed by this provider.     Outside records reviewed from: Care Everywhere    LAB/DIAGNOSTIC STUDIES TODAY:    Component      Latest Ref Rng 9/20/2024  8:59 AM   Sodium      135 - 145 mmol/L 137    Potassium      3.4 - 5.3 mmol/L 4.3    Chloride      98 - 107 mmol/L 106    Carbon Dioxide (CO2)      22 - 29 mmol/L 22    Anion Gap      7 - 15 mmol/L 9    Urea Nitrogen      6.0 - 20.0 mg/dL 15.8    Creatinine      0.67 - 1.17 mg/dL 0.82    GFR Estimate      >60 mL/min/1.73m2 >90    Calcium      8.8 - 10.4 mg/dL 10.8 (H)    Glucose      70 - 99 mg/dL 91    WBC      4.0 - 11.0 10e3/uL 8.0    RBC Count      4.40 - 5.90 10e6/uL 4.95    Hemoglobin      13.3 - 17.7 g/dL 14.9    Hematocrit      40.0 - 53.0 % 43.9    MCV      78 - 100 fL 89    MCH      26.5 - 33.0 pg 30.1    MCHC      31.5 - 36.5 g/dL 33.9    RDW      10.0 - 15.0 % 13.0    Platelet Count      150 - 450 10e3/uL 259       Legend:  (H) High    Assessment    Dontrell Schulz is a 44 year old male seen as a PAC referral for risk assessment and optimization for anesthesia.    Plan/Recommendations  Pt will be optimized for the proposed procedure.  See below for details on the assessment, risk, and preoperative recommendations    NEUROLOGY  - No history of TIA, CVA or seizure    -Post Op delirium risk factors:  No risk identified    ENT  - No current airway concerns.  Will need to be reassessed day of surgery.  Mallampati: II  TM: > 3    CARDIAC  - No history of CAD and Afib  - METS (Metabolic Equivalents)  Patient performs 4 or more METS exercise without symptoms             Total Score: 0      RCRI-Very low risk: Class 1 0.4% complication rate             Total Score: 0         PULMONARY    NORMA High Risk             Total Score: 5    NORMA: Snores loudly    NORMA: Hypertension    NORMA: BMI over 35 kg/m2    NORMA: Neck Circum >16 in    NORMA: Male      - Denies asthma or inhaler use  - Tobacco History    History   Smoking Status    Every Day    Types: Cigarettes   Smokeless Tobacco    Never       GI  - denies GERD  PONV Low Risk  Total Score: 1           1 AN PONV: Intended Post Op Opioids            ENDOCRINE    - BMI: Estimated body mass index is 53.3 kg/m  as calculated from the following:    Height as of this encounter: 1.829 m (6').    Weight as of this encounter: 178.3 kg (393 lb).  Class 3 Obesity (BMI > 40)  - No history of Diabetes Mellitus    - hyperparathyroidism.  Surgery planned as above.     HEME  VTE Low Risk 0.5%             Total Score: 3    VTE: BMI greater than 39    VTE: Male      - No history of abnormal bleeding or antiplatelet use.      MSK  Patient is NOT Frail             Total Score: 0              Different anesthesia methods/types have been discussed with the patient, but they are aware that the final plan will be decided by the assigned anesthesia provider on the date of service.  Patient was discussed with Dr Garcia by Dr. Vallejo (resident)    The patient is optimized for their procedure. AVS with information on surgery time/arrival time, meds and NPO status given by nursing staff. No further diagnostic testing indicated.      On the day of service:     Prep time: 6 minutes  Visit time: 15 minutes  Documentation time: 8 minutes  ------------------------------------------  Total time: 29 minutes      PATRICK Echavarria CNP  Preoperative Assessment Center  [facility]  Phone: [phone]  Fax: [phone]

## 2024-09-20 NOTE — PATIENT INSTRUCTIONS
Preparing for Your Surgery      Name:  Dontrell Schulz   MRN:  [id number]   :  1980   Today's Date:  2024       Arriving for surgery:  Surgery date:  10/3/24  Arrival time:  9.30AM    Please come to:     Please come to:       [facility]    [address] [place]  [address]     The [facility] Patient/Visitor Ramp is at [address]. Patients and visitors who self-park will receive the reduced hospital parking rate. If the Patient /Visitor Ramp is full, please follow the signs to the 56.com car park located at the main hospital entrance.       parking is available (24 hours/ 7 days a week)      Discounted parking pass options are available for patients and visitors. They can be purchased at the [place] desk at the main hospital entrance.     -    Stop at the security desk and they will direct surgery patients to [facility] [phone]        - If you need directions, a wheelchair or an escort please stop at the Information/security desk in the lobby.     What can I eat or drink?  -  You may eat and drink normally up to 8 hours prior to arrival time. (Until 1.30AM)  -  You may have clear liquids until 2 hours prior to arrival time. (Until 7.30AM)    Examples of clear liquids:  Water  Clear broth  Juices (apple, white grape, white cranberry  and cider) without pulp  Noncarbonated, powder based beverages  (lemonade and Scott-Aid)  Sodas (Sprite, 7-Up, ginger ale and seltzer)  Coffee or tea (without milk or cream)  Gatorade    -  No Alcohol or cannabis products for at least 24 hours before surgery.     Which medicines can I take?    Hold Aspirin for 7 days before surgery.   Hold Multivitamins for 7 days before surgery.  Hold Supplements for 7 days before surgery.  Hold Ibuprofen (Advil, Motrin) for 1 day(s) before surgery--unless otherwise  directed by surgeon.  Hold Naproxen (Aleve) for 4 days before surgery.    Hold Diclofenac gel 1 day before surgery.  Hold Meloxicam (Mobic) for 10 days before surgery (if you are currently taking it)    -  DO NOT take these medications the day of surgery:  Lisinopril (Zestril)    -  PLEASE TAKE these medications the day of surgery:  Amlodipine (Norvasc)      How do I prepare myself?  - Please take 2 showers (one the night prior to surgery and one the morning of surgery) using Scrubcare or Hibiclens soap.    Use this soap only from the neck to your toes.     Leave the soap on your skin for one minute--then rinse thoroughly.      You may use your own shampoo and conditioner. No other hair products.   - Please remove all jewelry and body piercings.  - No lotions, deodorants or fragrance.  - No makeup or fingernail polish.   - Bring your ID and insurance card.    -If you use a CPAP machine, please bring the CPAP machine, tubing, and mask to hospital.    -If you have a Deep Brain Stimulator, Spinal Cord Stimulator, or any Neuro Stimulator device---you must bring the remote control to the hospital.      ALL PATIENTS GOING HOME THE SAME DAY OF SURGERY ARE REQUIRED TO HAVE A RESPONSIBLE ADULT TO DRIVE AND BE IN ATTENDANCE WITH THEM FOR 24 HOURS FOLLOWING SURGERY.    Covid testing policy as of 12/06/2022  Your surgeon will notify and schedule you for a COVID test if one is needed before surgery--please direct any questions or COVID symptoms to your surgeon      Questions or Concerns:    - For any questions regarding the day of surgery or your hospital stay, please contact the Pre Admission Nursing Office at [phone].       - If you have health changes between today and your surgery, please call your surgeon.       - For questions after surgery, please call your surgeons office.           Current Visitor Guidelines    You may have 2 visitors in the pre op area.    Visiting hours: 8 a.m. to 8:30 p.m.    Patients confirmed  or suspected to have symptoms of COVID 19 or flu:     No visitors allowed for adult patients.   Children (under age 18) can have 1 named visitor.     People who are sick or showing symptoms of COVID 19 or flu:    Are not allowed to visit patients--we can only make exceptions in special situations.       Please follow these guidelines for your visit:          Please maintain social distance          Masking is optional--however at times you may be asked to wear a mask for the safety of yourself and others     Clean your hands with alcohol hand . Do this when you arrive at and leave the building and patient room,    And again after you touch your mask or anything in the room.     Go directly to and from the room you are visiting.     Stay in the patient s room during your visit. Limit going to other places in the hospital as much as possible     Leave bags and jackets at home or in the car.     For everyone s health, please don t come and go during your visit. That includes for smoking   during your visit.

## 2024-09-20 NOTE — RESULT ENCOUNTER NOTE
Devang Jon,    Your test results are attached.  Your labs are good for surgery.  Your calcium is fairly stable.       Chelle Lewis DNP, RN, ANP-C

## 2024-09-26 ENCOUNTER — PREP FOR PROCEDURE (OUTPATIENT)
Dept: OTOLARYNGOLOGY | Facility: CLINIC | Age: 44
End: 2024-09-26
Payer: COMMERCIAL

## 2024-10-02 NOTE — ANESTHESIA PREPROCEDURE EVALUATION
Anesthesia Pre-Procedure Evaluation    Patient: Dontrell Schulz   MRN: [id number] : 1980        Procedure : Procedure(s):  PARATHYROIDECTOMY          Past Medical History:   Diagnosis Date     Hyperparathyroidism (H)      Hypertension      Legally blind in right eye, as defined in [place]     trauma in 2000     Morbid obesity (H)      Osteoarthritis of both knees, unspecified osteoarthritis type      Tobacco use       Past Surgical History:   Procedure Laterality Date     EYE SURGERY Right 2000    trauma related - Retinal injury.      Allergies   Allergen Reactions     Nkda [No Known Drug Allergy]       Social History     Tobacco Use     Smoking status: Every Day     Current packs/day: 0.00     Average packs/day: 1 pack/day for 10.0 years (10.0 ttl pk-yrs)     Types: Cigarettes     Start date: 2011     Last attempt to quit: 2021     Years since quitting: 3.0     Smokeless tobacco: Never     Tobacco comments:     1-2 cigarettes daily   Substance Use Topics     Alcohol use: Not Currently     Comment: occas      Wt Readings from Last 1 Encounters:   24 (!) 178.3 kg (393 lb)        Anesthesia Evaluation   Pt has had prior anesthetic. Type: General.        ROS/MED HX  ENT/Pulmonary:     (+) sleep apnea, doesn't use CPAP,                                      Neurologic:       Cardiovascular:     (+) Dyslipidemia hypertension- -   -  - -                                      METS/Exercise Tolerance: >4 METS    Hematologic:       Musculoskeletal:   (+)  arthritis,             GI/Hepatic:     (+) GERD,                   Renal/Genitourinary:       Endo:     (+)               Obesity,       Psychiatric/Substance Use:       Infectious Disease:       Malignancy:       Other:            Physical Exam    Airway        Mallampati: II   TM distance: > 3 FB   Neck ROM: full   Mouth opening: > 3 cm    Respiratory Devices and Support         Dental       (+) Modest Abnormalities - crowns, retainers, 1 or 2 missing  "teeth    B=Bridge, C=Chipped, L=Loose, M=Missing    Cardiovascular          Rhythm and rate: regular and normal     Pulmonary           breath sounds clear to auscultation       OUTSIDE LABS:  CBC:   Lab Results   Component Value Date    WBC 8.0 09/20/2024    WBC 5.9 07/10/2019    HGB 14.9 09/20/2024    HGB 15.4 07/10/2019    HCT 43.9 09/20/2024    HCT 45.7 07/10/2019     09/20/2024     07/10/2019     BMP:   Lab Results   Component Value Date     09/20/2024     04/02/2024    POTASSIUM 4.3 09/20/2024    POTASSIUM 4.8 04/02/2024    CHLORIDE 106 09/20/2024    CHLORIDE 104 04/02/2024    CO2 22 09/20/2024    CO2 25 04/02/2024    BUN 15.8 09/20/2024    BUN 18.3 04/02/2024    CR 0.82 09/20/2024    CR 0.90 04/02/2024    GLC 91 09/20/2024    GLC 85 04/02/2024     COAGS:   Lab Results   Component Value Date    INR 1.06 07/10/2019     POC: No results found for: \"BGM\", \"HCG\", \"HCGS\"  HEPATIC:   Lab Results   Component Value Date    ALBUMIN 4.6 06/24/2024    PROTTOTAL 6.8 07/10/2019    ALT 51 07/10/2019    AST 26 07/10/2019    ALKPHOS 78 07/10/2019    BILITOTAL 0.5 07/10/2019     OTHER:   Lab Results   Component Value Date    LACT 0.7 07/10/2019    A1C 5.2 10/12/2017    GEN 10.8 (H) 09/20/2024    PHOS 2.3 (L) 06/24/2024    TSH 0.64 04/19/2024    CRP <2.9 10/30/2017    SED 6 10/30/2017       Anesthesia Plan    ASA Status:  3    NPO Status:  NPO Appropriate    Anesthesia Type: General.     - Airway: ETT   Induction: Intravenous, Propofol.   Maintenance: Balanced.   Techniques and Equipment:     - Airway: Video-Laryngoscope     - Lines/Monitors: BIS, 2nd IV     Consents    Anesthesia Plan(s) and associated risks, benefits, and realistic alternatives discussed. Questions answered and patient/representative(s) expressed understanding.     - Discussed: Risks, Benefits and Alternatives for the PROCEDURE were discussed     - Discussed with:  Patient      - Extended Intubation/Ventilatory Support Discussed: No. "      - Patient is DNR/DNI Status: No     Use of blood products discussed: No .     Postoperative Care    Pain management: IV analgesics, Oral pain medications, Multi-modal analgesia.   PONV prophylaxis: Ondansetron (or other 5HT-3), Dexamethasone or Solumedrol     Comments:               Dion Rodriguez MD    I have reviewed the pertinent notes and labs in the chart from the past 30 days and (re)examined the patient.  Any updates or changes from those notes are reflected in this note.      # Hypercalcemia: Highest Ca = 10.8 mg/dL in last 30 days, will monitor as appropriate         # Severe Obesity: Estimated body mass index is 53.3 kg/m  as calculated from the following:    Height as of 9/20/24: 1.829 m (6').    Weight as of 9/20/24: 178.3 kg (393 lb).

## 2024-10-03 ENCOUNTER — HOSPITAL ENCOUNTER (OUTPATIENT)
Facility: CLINIC | Age: 44
Discharge: HOME | End: 2024-10-03
Attending: STUDENT IN AN ORGANIZED HEALTH CARE EDUCATION/TRAINING PROGRAM | Admitting: STUDENT IN AN ORGANIZED HEALTH CARE EDUCATION/TRAINING PROGRAM
Payer: COMMERCIAL

## 2024-10-03 ENCOUNTER — ANESTHESIA (OUTPATIENT)
Dept: SURGERY | Facility: CLINIC | Age: 44
End: 2024-10-03
Payer: COMMERCIAL

## 2024-10-03 VITALS
WEIGHT: 315 LBS | HEART RATE: 88 BPM | SYSTOLIC BLOOD PRESSURE: 125 MMHG | TEMPERATURE: 98.4 F | DIASTOLIC BLOOD PRESSURE: 70 MMHG | HEIGHT: 71 IN | BODY MASS INDEX: 44.1 KG/M2 | RESPIRATION RATE: 14 BRPM | OXYGEN SATURATION: 94 %

## 2024-10-03 DIAGNOSIS — I10 ESSENTIAL HYPERTENSION: Primary | ICD-10-CM

## 2024-10-03 PROBLEM — Z78.9 DIFFICULT INTRAVENOUS ACCESS: Status: ACTIVE | Noted: 2024-10-03

## 2024-10-03 LAB
PTH-INTACT SERPL-MCNC: 138 PG/ML (ref 15–65)
PTH-INTACT SERPL-MCNC: 27 PG/ML (ref 15–65)
PTH-INTACT SERPL-MCNC: 29 PG/ML (ref 15–65)
PTH-INTACT SERPL-MCNC: 36 PG/ML (ref 15–65)

## 2024-10-03 PROCEDURE — 88305 TISSUE EXAM BY PATHOLOGIST: CPT | Mod: 26 | Performed by: PATHOLOGY

## 2024-10-03 PROCEDURE — 272N000001 HC OR GENERAL SUPPLY STERILE: Performed by: STUDENT IN AN ORGANIZED HEALTH CARE EDUCATION/TRAINING PROGRAM

## 2024-10-03 PROCEDURE — 250N000011 HC RX IP 250 OP 636

## 2024-10-03 PROCEDURE — 83970 ASSAY OF PARATHORMONE: CPT | Performed by: STUDENT IN AN ORGANIZED HEALTH CARE EDUCATION/TRAINING PROGRAM

## 2024-10-03 PROCEDURE — 250N000009 HC RX 250

## 2024-10-03 PROCEDURE — 88305 TISSUE EXAM BY PATHOLOGIST: CPT | Mod: TC | Performed by: STUDENT IN AN ORGANIZED HEALTH CARE EDUCATION/TRAINING PROGRAM

## 2024-10-03 PROCEDURE — 60500 EXPLORE PARATHYROID GLANDS: CPT

## 2024-10-03 PROCEDURE — 710N000010 HC RECOVERY PHASE 1, LEVEL 2, PER MIN: Performed by: STUDENT IN AN ORGANIZED HEALTH CARE EDUCATION/TRAINING PROGRAM

## 2024-10-03 PROCEDURE — 250N000013 HC RX MED GY IP 250 OP 250 PS 637

## 2024-10-03 PROCEDURE — 83970 ASSAY OF PARATHORMONE: CPT | Mod: 91 | Performed by: STUDENT IN AN ORGANIZED HEALTH CARE EDUCATION/TRAINING PROGRAM

## 2024-10-03 PROCEDURE — 250N000011 HC RX IP 250 OP 636: Performed by: STUDENT IN AN ORGANIZED HEALTH CARE EDUCATION/TRAINING PROGRAM

## 2024-10-03 PROCEDURE — 360N000077 HC SURGERY LEVEL 4, PER MIN: Performed by: STUDENT IN AN ORGANIZED HEALTH CARE EDUCATION/TRAINING PROGRAM

## 2024-10-03 PROCEDURE — 250N000009 HC RX 250: Performed by: STUDENT IN AN ORGANIZED HEALTH CARE EDUCATION/TRAINING PROGRAM

## 2024-10-03 PROCEDURE — 60500 EXPLORE PARATHYROID GLANDS: CPT | Mod: 22 | Performed by: STUDENT IN AN ORGANIZED HEALTH CARE EDUCATION/TRAINING PROGRAM

## 2024-10-03 PROCEDURE — 250N000013 HC RX MED GY IP 250 OP 250 PS 637: Performed by: STUDENT IN AN ORGANIZED HEALTH CARE EDUCATION/TRAINING PROGRAM

## 2024-10-03 PROCEDURE — 258N000003 HC RX IP 258 OP 636

## 2024-10-03 PROCEDURE — 370N000017 HC ANESTHESIA TECHNICAL FEE, PER MIN: Performed by: STUDENT IN AN ORGANIZED HEALTH CARE EDUCATION/TRAINING PROGRAM

## 2024-10-03 PROCEDURE — 710N000012 HC RECOVERY PHASE 2, PER MINUTE: Performed by: STUDENT IN AN ORGANIZED HEALTH CARE EDUCATION/TRAINING PROGRAM

## 2024-10-03 PROCEDURE — 250N000011 HC RX IP 250 OP 636: Performed by: NURSE ANESTHETIST, CERTIFIED REGISTERED

## 2024-10-03 PROCEDURE — 999N000141 HC STATISTIC PRE-PROCEDURE NURSING ASSESSMENT: Performed by: STUDENT IN AN ORGANIZED HEALTH CARE EDUCATION/TRAINING PROGRAM

## 2024-10-03 PROCEDURE — 250N000025 HC SEVOFLURANE, PER MIN: Performed by: STUDENT IN AN ORGANIZED HEALTH CARE EDUCATION/TRAINING PROGRAM

## 2024-10-03 RX ORDER — OXYCODONE HYDROCHLORIDE 5 MG/1
5 TABLET ORAL
Status: DISCONTINUED | OUTPATIENT
Start: 2024-10-03 | End: 2024-10-03 | Stop reason: HOSPADM

## 2024-10-03 RX ORDER — FENTANYL CITRATE 50 UG/ML
50 INJECTION, SOLUTION INTRAMUSCULAR; INTRAVENOUS EVERY 5 MIN PRN
Status: DISCONTINUED | OUTPATIENT
Start: 2024-10-03 | End: 2024-10-03 | Stop reason: HOSPADM

## 2024-10-03 RX ORDER — SODIUM CHLORIDE, SODIUM GLUCONATE, SODIUM ACETATE, POTASSIUM CHLORIDE AND MAGNESIUM CHLORIDE 526; 502; 368; 37; 30 MG/100ML; MG/100ML; MG/100ML; MG/100ML; MG/100ML
INJECTION, SOLUTION INTRAVENOUS CONTINUOUS PRN
Status: DISCONTINUED | OUTPATIENT
Start: 2024-10-03 | End: 2024-10-03

## 2024-10-03 RX ORDER — LIDOCAINE HYDROCHLORIDE AND EPINEPHRINE 10; 10 MG/ML; UG/ML
INJECTION, SOLUTION INFILTRATION; PERINEURAL PRN
Status: DISCONTINUED | OUTPATIENT
Start: 2024-10-03 | End: 2024-10-03 | Stop reason: HOSPADM

## 2024-10-03 RX ORDER — FENTANYL CITRATE 50 UG/ML
INJECTION, SOLUTION INTRAMUSCULAR; INTRAVENOUS PRN
Status: DISCONTINUED | OUTPATIENT
Start: 2024-10-03 | End: 2024-10-03

## 2024-10-03 RX ORDER — ONDANSETRON 4 MG/1
4 TABLET, ORALLY DISINTEGRATING ORAL EVERY 30 MIN PRN
Status: DISCONTINUED | OUTPATIENT
Start: 2024-10-03 | End: 2024-10-03 | Stop reason: HOSPADM

## 2024-10-03 RX ORDER — NALOXONE HYDROCHLORIDE 0.4 MG/ML
0.1 INJECTION, SOLUTION INTRAMUSCULAR; INTRAVENOUS; SUBCUTANEOUS
Status: DISCONTINUED | OUTPATIENT
Start: 2024-10-03 | End: 2024-10-03 | Stop reason: HOSPADM

## 2024-10-03 RX ORDER — SODIUM CHLORIDE, SODIUM LACTATE, POTASSIUM CHLORIDE, CALCIUM CHLORIDE 600; 310; 30; 20 MG/100ML; MG/100ML; MG/100ML; MG/100ML
INJECTION, SOLUTION INTRAVENOUS CONTINUOUS
Status: DISCONTINUED | OUTPATIENT
Start: 2024-10-03 | End: 2024-10-03 | Stop reason: HOSPADM

## 2024-10-03 RX ORDER — VASOPRESSIN IN 0.9 % NACL 2 UNIT/2ML
SYRINGE (ML) INTRAVENOUS PRN
Status: DISCONTINUED | OUTPATIENT
Start: 2024-10-03 | End: 2024-10-03

## 2024-10-03 RX ORDER — SODIUM CHLORIDE, SODIUM LACTATE, POTASSIUM CHLORIDE, CALCIUM CHLORIDE 600; 310; 30; 20 MG/100ML; MG/100ML; MG/100ML; MG/100ML
INJECTION, SOLUTION INTRAVENOUS CONTINUOUS PRN
Status: DISCONTINUED | OUTPATIENT
Start: 2024-10-03 | End: 2024-10-03

## 2024-10-03 RX ORDER — ONDANSETRON 2 MG/ML
INJECTION INTRAMUSCULAR; INTRAVENOUS PRN
Status: DISCONTINUED | OUTPATIENT
Start: 2024-10-03 | End: 2024-10-03

## 2024-10-03 RX ORDER — DEXAMETHASONE SODIUM PHOSPHATE 4 MG/ML
4 INJECTION, SOLUTION INTRA-ARTICULAR; INTRALESIONAL; INTRAMUSCULAR; INTRAVENOUS; SOFT TISSUE
Status: DISCONTINUED | OUTPATIENT
Start: 2024-10-03 | End: 2024-10-03 | Stop reason: HOSPADM

## 2024-10-03 RX ORDER — HYDROMORPHONE HCL IN WATER/PF 6 MG/30 ML
0.2 PATIENT CONTROLLED ANALGESIA SYRINGE INTRAVENOUS EVERY 5 MIN PRN
Status: DISCONTINUED | OUTPATIENT
Start: 2024-10-03 | End: 2024-10-03 | Stop reason: HOSPADM

## 2024-10-03 RX ORDER — EPHEDRINE SULFATE 50 MG/ML
INJECTION, SOLUTION INTRAMUSCULAR; INTRAVENOUS; SUBCUTANEOUS PRN
Status: DISCONTINUED | OUTPATIENT
Start: 2024-10-03 | End: 2024-10-03

## 2024-10-03 RX ORDER — OXYCODONE HYDROCHLORIDE 5 MG/1
5-10 TABLET ORAL EVERY 4 HOURS PRN
Qty: 20 TABLET | Refills: 0 | Status: SHIPPED | OUTPATIENT
Start: 2024-10-03

## 2024-10-03 RX ORDER — GLYCOPYRROLATE 0.2 MG/ML
INJECTION, SOLUTION INTRAMUSCULAR; INTRAVENOUS PRN
Status: DISCONTINUED | OUTPATIENT
Start: 2024-10-03 | End: 2024-10-03

## 2024-10-03 RX ORDER — FENTANYL CITRATE 50 UG/ML
25 INJECTION, SOLUTION INTRAMUSCULAR; INTRAVENOUS EVERY 5 MIN PRN
Status: DISCONTINUED | OUTPATIENT
Start: 2024-10-03 | End: 2024-10-03 | Stop reason: HOSPADM

## 2024-10-03 RX ORDER — OXYCODONE HYDROCHLORIDE 5 MG/1
5 TABLET ORAL
Status: COMPLETED | OUTPATIENT
Start: 2024-10-03 | End: 2024-10-03

## 2024-10-03 RX ORDER — ONDANSETRON 4 MG/1
4 TABLET, ORALLY DISINTEGRATING ORAL
Status: DISCONTINUED | OUTPATIENT
Start: 2024-10-03 | End: 2024-10-03 | Stop reason: HOSPADM

## 2024-10-03 RX ORDER — ONDANSETRON 2 MG/ML
4 INJECTION INTRAMUSCULAR; INTRAVENOUS EVERY 30 MIN PRN
Status: DISCONTINUED | OUTPATIENT
Start: 2024-10-03 | End: 2024-10-03 | Stop reason: HOSPADM

## 2024-10-03 RX ORDER — PROPOFOL 10 MG/ML
INJECTION, EMULSION INTRAVENOUS PRN
Status: DISCONTINUED | OUTPATIENT
Start: 2024-10-03 | End: 2024-10-03

## 2024-10-03 RX ORDER — DEXAMETHASONE SODIUM PHOSPHATE 4 MG/ML
10 INJECTION, SOLUTION INTRA-ARTICULAR; INTRALESIONAL; INTRAMUSCULAR; INTRAVENOUS; SOFT TISSUE ONCE
Status: COMPLETED | OUTPATIENT
Start: 2024-10-03 | End: 2024-10-03

## 2024-10-03 RX ORDER — LIDOCAINE HYDROCHLORIDE 20 MG/ML
INJECTION, SOLUTION INFILTRATION; PERINEURAL PRN
Status: DISCONTINUED | OUTPATIENT
Start: 2024-10-03 | End: 2024-10-03

## 2024-10-03 RX ORDER — OXYCODONE HYDROCHLORIDE 10 MG/1
10 TABLET ORAL
Status: DISCONTINUED | OUTPATIENT
Start: 2024-10-03 | End: 2024-10-03 | Stop reason: HOSPADM

## 2024-10-03 RX ORDER — HYDROMORPHONE HCL IN WATER/PF 6 MG/30 ML
0.4 PATIENT CONTROLLED ANALGESIA SYRINGE INTRAVENOUS EVERY 5 MIN PRN
Status: DISCONTINUED | OUTPATIENT
Start: 2024-10-03 | End: 2024-10-03 | Stop reason: HOSPADM

## 2024-10-03 RX ORDER — ACETAMINOPHEN 325 MG/1
650 TABLET ORAL
Status: DISCONTINUED | OUTPATIENT
Start: 2024-10-03 | End: 2024-10-03 | Stop reason: HOSPADM

## 2024-10-03 RX ADMIN — PROPOFOL 50 MG: 10 INJECTION, EMULSION INTRAVENOUS at 11:47

## 2024-10-03 RX ADMIN — MIDAZOLAM 2 MG: 1 INJECTION INTRAMUSCULAR; INTRAVENOUS at 11:36

## 2024-10-03 RX ADMIN — FENTANYL CITRATE 100 MCG: 50 INJECTION INTRAMUSCULAR; INTRAVENOUS at 11:15

## 2024-10-03 RX ADMIN — Medication 1 UNITS: at 12:26

## 2024-10-03 RX ADMIN — NOREPINEPHRINE BITARTRATE 12.8 MCG: 1 INJECTION, SOLUTION, CONCENTRATE INTRAVENOUS at 12:45

## 2024-10-03 RX ADMIN — Medication 1 LOZENGE: at 14:59

## 2024-10-03 RX ADMIN — ACETAMINOPHEN 650 MG: 325 TABLET ORAL at 15:27

## 2024-10-03 RX ADMIN — NOREPINEPHRINE BITARTRATE 12.8 MCG: 1 INJECTION, SOLUTION, CONCENTRATE INTRAVENOUS at 12:38

## 2024-10-03 RX ADMIN — LIDOCAINE HYDROCHLORIDE 100 MG: 20 INJECTION, SOLUTION INFILTRATION; PERINEURAL at 11:41

## 2024-10-03 RX ADMIN — NOREPINEPHRINE BITARTRATE 12.8 MCG: 1 INJECTION, SOLUTION, CONCENTRATE INTRAVENOUS at 12:25

## 2024-10-03 RX ADMIN — OXYCODONE HYDROCHLORIDE 5 MG: 5 TABLET ORAL at 15:27

## 2024-10-03 RX ADMIN — NOREPINEPHRINE BITARTRATE 12.8 MCG: 1 INJECTION, SOLUTION, CONCENTRATE INTRAVENOUS at 13:56

## 2024-10-03 RX ADMIN — PROPOFOL 200 MG: 10 INJECTION, EMULSION INTRAVENOUS at 11:41

## 2024-10-03 RX ADMIN — PROPOFOL 50 MG: 10 INJECTION, EMULSION INTRAVENOUS at 11:44

## 2024-10-03 RX ADMIN — SODIUM CHLORIDE, POTASSIUM CHLORIDE, SODIUM LACTATE AND CALCIUM CHLORIDE: 600; 310; 30; 20 INJECTION, SOLUTION INTRAVENOUS at 11:52

## 2024-10-03 RX ADMIN — PHENYLEPHRINE HYDROCHLORIDE 200 MCG: 10 INJECTION INTRAVENOUS at 12:14

## 2024-10-03 RX ADMIN — NOREPINEPHRINE BITARTRATE 12.8 MCG: 1 INJECTION, SOLUTION, CONCENTRATE INTRAVENOUS at 14:12

## 2024-10-03 RX ADMIN — FENTANYL CITRATE 50 MCG: 50 INJECTION INTRAMUSCULAR; INTRAVENOUS at 11:36

## 2024-10-03 RX ADMIN — FENTANYL CITRATE 50 MCG: 50 INJECTION, SOLUTION INTRAMUSCULAR; INTRAVENOUS at 14:39

## 2024-10-03 RX ADMIN — ONDANSETRON 4 MG: 2 INJECTION INTRAMUSCULAR; INTRAVENOUS at 11:51

## 2024-10-03 RX ADMIN — NOREPINEPHRINE BITARTRATE 6.4 MCG: 1 INJECTION, SOLUTION, CONCENTRATE INTRAVENOUS at 12:18

## 2024-10-03 RX ADMIN — HYDROMORPHONE HYDROCHLORIDE 0.5 MG: 1 INJECTION, SOLUTION INTRAMUSCULAR; INTRAVENOUS; SUBCUTANEOUS at 13:57

## 2024-10-03 RX ADMIN — SODIUM CHLORIDE, SODIUM GLUCONATE, SODIUM ACETATE, POTASSIUM CHLORIDE AND MAGNESIUM CHLORIDE: 526; 502; 368; 37; 30 INJECTION, SOLUTION INTRAVENOUS at 14:19

## 2024-10-03 RX ADMIN — PHENYLEPHRINE HYDROCHLORIDE 200 MCG: 10 INJECTION INTRAVENOUS at 12:09

## 2024-10-03 RX ADMIN — SODIUM CHLORIDE, SODIUM GLUCONATE, SODIUM ACETATE, POTASSIUM CHLORIDE AND MAGNESIUM CHLORIDE: 526; 502; 368; 37; 30 INJECTION, SOLUTION INTRAVENOUS at 12:39

## 2024-10-03 RX ADMIN — EPHEDRINE SULFATE 10 MG: 5 INJECTION INTRAVENOUS at 12:12

## 2024-10-03 RX ADMIN — Medication 1 UNITS: at 12:34

## 2024-10-03 RX ADMIN — NOREPINEPHRINE BITARTRATE 12.8 MCG: 1 INJECTION, SOLUTION, CONCENTRATE INTRAVENOUS at 13:47

## 2024-10-03 RX ADMIN — Medication 100 MG: at 13:58

## 2024-10-03 RX ADMIN — EPHEDRINE SULFATE 5 MG: 5 INJECTION INTRAVENOUS at 12:09

## 2024-10-03 RX ADMIN — EPHEDRINE SULFATE 10 MG: 5 INJECTION INTRAVENOUS at 12:14

## 2024-10-03 RX ADMIN — NOREPINEPHRINE BITARTRATE 12.8 MCG: 1 INJECTION, SOLUTION, CONCENTRATE INTRAVENOUS at 12:58

## 2024-10-03 RX ADMIN — SODIUM CHLORIDE, SODIUM GLUCONATE, SODIUM ACETATE, POTASSIUM CHLORIDE AND MAGNESIUM CHLORIDE: 526; 502; 368; 37; 30 INJECTION, SOLUTION INTRAVENOUS at 11:52

## 2024-10-03 RX ADMIN — Medication 50 MG: at 11:44

## 2024-10-03 RX ADMIN — DEXAMETHASONE SODIUM PHOSPHATE 10 MG: 4 INJECTION, SOLUTION INTRAMUSCULAR; INTRAVENOUS at 11:51

## 2024-10-03 RX ADMIN — PHENYLEPHRINE HYDROCHLORIDE 200 MCG: 10 INJECTION INTRAVENOUS at 12:04

## 2024-10-03 RX ADMIN — EPINEPHRINE 10 MCG: 1 INJECTION INTRAMUSCULAR; INTRAVENOUS; SUBCUTANEOUS at 12:58

## 2024-10-03 RX ADMIN — NOREPINEPHRINE BITARTRATE 6.4 MCG: 1 INJECTION, SOLUTION, CONCENTRATE INTRAVENOUS at 13:31

## 2024-10-03 RX ADMIN — FENTANYL CITRATE 50 MCG: 50 INJECTION, SOLUTION INTRAMUSCULAR; INTRAVENOUS at 14:59

## 2024-10-03 RX ADMIN — FENTANYL CITRATE 50 MCG: 50 INJECTION, SOLUTION INTRAMUSCULAR; INTRAVENOUS at 14:48

## 2024-10-03 RX ADMIN — NOREPINEPHRINE BITARTRATE 12.8 MCG: 1 INJECTION, SOLUTION, CONCENTRATE INTRAVENOUS at 14:05

## 2024-10-03 RX ADMIN — GLYCOPYRROLATE 0.2 MG: 0.2 INJECTION, SOLUTION INTRAMUSCULAR; INTRAVENOUS at 12:29

## 2024-10-03 RX ADMIN — NOREPINEPHRINE BITARTRATE 12.8 MCG: 1 INJECTION, SOLUTION, CONCENTRATE INTRAVENOUS at 13:40

## 2024-10-03 RX ADMIN — REMIFENTANIL HYDROCHLORIDE 0.08 MCG/KG/MIN: 1 INJECTION, POWDER, LYOPHILIZED, FOR SOLUTION INTRAVENOUS at 11:55

## 2024-10-03 RX ADMIN — NOREPINEPHRINE BITARTRATE 12.8 MCG: 1 INJECTION, SOLUTION, CONCENTRATE INTRAVENOUS at 13:28

## 2024-10-03 RX ADMIN — NOREPINEPHRINE BITARTRATE 12.8 MCG: 1 INJECTION, SOLUTION, CONCENTRATE INTRAVENOUS at 12:55

## 2024-10-03 RX ADMIN — FENTANYL CITRATE 50 MCG: 50 INJECTION INTRAMUSCULAR; INTRAVENOUS at 11:47

## 2024-10-03 ASSESSMENT — ACTIVITIES OF DAILY LIVING (ADL)
ADLS_ACUITY_SCORE: 29

## 2024-10-03 NOTE — DISCHARGE INSTRUCTIONS
Contacting your Doctor -   To contact a doctor, call: Dr Caba's clinic at [phone] (Monday to Friday 8:00 AM to 4:30 PM) or:  [phone] and ask for the resident on call for ENT-Otolaryngology (answered 24 hours a day)   Emergency Department:  [facility]: [phone]  [facility]: [phone] if you are in need of immediate or emergent help

## 2024-10-03 NOTE — OP NOTE
Head and Neck Surgery  October 3, 2024     Surgeon: Aquilino Caba MD     Assistant: Stacey Bryant MD     Preoperative Diagnosis:   1) Primary hyperparathyroidism  2) Obesity     Postoperative Diagnosis:  Same     Procedure:  1) parathyroidectomy with removal of right inferior parathyroid adenoma    22 modifier. Given patients BMI, extra time and technical skill was used to remove the adenoma safely.      Anesthesia:  General     Estimated blood loss:  10 ml     Specimens:  Right inferior parathyroid adenoma     Complications:  None     Operative indications:  44 year old male with primary hyperparathyroidism. His localization studies did not localize. Based on my US in clinic, I was suspicious of a right inferior adenoma. I recommended proceeding with the above listed procedure. Consent was obtained.      Operative findings:  Right inferior parathyroid adenoma. Intra-op PTH went from baseline level of 138, excision value of 36, 10 minute value of 29, and 15 minute value of 27. Vagus nerve stimulated at the end of the case. Right superior parathyroid appeared normal.      Operative course:  Patient was brought to the operating room and placed on the operating table supine. General endotracheal anesthesia was induced with a nerve monitoring endotracheal tube. Incision was marked midway between the cricoid and sternal notch. 1% lidocaine with 1:100,000 epinephrine was used for local anesthesia. Timeout was performed to identify the patient and correctness of the procedure.  15 blade used to make incision through skin, subcutaneous tissue and platysma. Standard subplatysmal flaps were elevated superior and inferiorly. Midline raphe was opened. Right sternohyoid muscle elevated off of the sternothyroid. The sternothyroid was divided midway along its course and muscle bellies reflected superiorly and inferiorly to expose the thyroid lobe. . The thyroid lobe was reflected medially. The adenoma was identified in the  anticipated location. I released the superior pole ad explored the superior location. The right superior parathyroid gland appeared normal. The parathyroid adenoma was then removed in a pericapsular plane without breach of the capsule. Post excision parathyroid hormone levels were drawn from the right internal jugular vein. Given an appropriate drop in parathyroid hormone levels, we elected to complete the case.     The wound was irrigated and hemostasis obtained. The midline raphe closed with 3-0 vicryl leaving a gap inferiorly, platysma with 3-0 vicryl, and skin with 4-0 monocryl.      Patient tolerated the procedure well. He was subsequently turned over to anesthesia where he was awakened, extubated, and transferred to the recovery room in stable condition.      Aquilino Caba MD

## 2024-10-03 NOTE — ANESTHESIA PROCEDURE NOTES
Airway       Patient location during procedure: OR       Procedure Start/Stop Times: 10/3/2024 11:44 AM  Staff -        Anesthesiologist:  Doin Rodriguez MD       Performed By: anesthesiologist  Consent for Airway        Urgency: elective  Indications and Patient Condition       Indications for airway management: seth-procedural       Induction type:intravenous       Mask difficulty assessment: 3 - difficult mask (inadequate, unstable, or two providers) +/- NMBA    Final Airway Details       Final airway type: endotracheal airway       Successful airway: NIM  Endotracheal Airway Details        ETT size (mm): 8.0       Cuffed: yes       Cuff volume (mL): 9       Successful intubation technique: video laryngoscopy       VL Blade Size: Glidescope 4       Grade View of Cords: 2       Adjucts: stylet       Position: Right       Measured from: lips       Secured at (cm): 23    Post intubation assessment        Placement verified by: capnometry, equal breath sounds and chest rise        Number of attempts at approach: 1       Number of other approaches attempted: 0       Secured with: tape       Ease of procedure: easy       Dentition: Intact and Unchanged    Medication(s) Administered   Medication Administration Time: 10/3/2024 11:44 AM

## 2024-10-03 NOTE — ANESTHESIA POSTPROCEDURE EVALUATION
Patient: Dontrell Schulz    Procedure: Procedure(s):  PARATHYROIDECTOMY       Anesthesia Type:  General    Note:  Disposition: Outpatient   Postop Pain Control: Uneventful            Sign Out: Well controlled pain   PONV: No   Neuro/Psych: Uneventful            Sign Out: Acceptable/Baseline neuro status   Airway/Respiratory: Uneventful            Sign Out: Acceptable/Baseline resp. status   CV/Hemodynamics: Uneventful            Sign Out: Acceptable CV status; No obvious hypovolemia; No obvious fluid overload   Other NRE: NONE   DID A NON-ROUTINE EVENT OCCUR? No           Last vitals:  Vitals Value Taken Time   /90 10/03/24 1515   Temp 36.8  C (98.2  F) 10/03/24 1515   Pulse 88 10/03/24 1524   Resp 22 10/03/24 1524   SpO2 90 % 10/03/24 1524   Vitals shown include unfiled device data.    Electronically Signed By: Polo Villagomez MD  October 3, 2024  3:24 PM

## 2024-10-03 NOTE — ANESTHESIA CARE TRANSFER NOTE
Patient: Dontrell Schulz    Procedure: Procedure(s):  PARATHYROIDECTOMY       Diagnosis: Hyperparathyroidism (H) [E21.3]  Diagnosis Additional Information: No value filed.    Anesthesia Type:   General     Note:    Oropharynx: oropharynx clear of all foreign objects and spontaneously breathing  Level of Consciousness: awake  Oxygen Supplementation: nasal cannula  Level of Supplemental Oxygen (L/min / FiO2): 5 LPM  Independent Airway: airway patency satisfactory and stable  Dentition: dentition unchanged  Vital Signs Stable: post-procedure vital signs reviewed and stable  Report to RN Given: handoff report given  Patient transferred to: PACU    Handoff Report: Identifed the Patient, Identified the Reponsible Provider, Reviewed the pertinent medical history, Discussed the surgical course, Reviewed Intra-OP anesthesia mangement and issues during anesthesia, Set expectations for post-procedure period and Allowed opportunity for questions and acknowledgement of understanding      Vitals:  Vitals Value Taken Time   /86 10/03/24 1433   Temp     Pulse 87 10/03/24 1439   Resp 17 10/03/24 1439   SpO2 93 % 10/03/24 1439   Vitals shown include unfiled device data.    Electronically Signed By: PATRICK Kang CRNA  October 3, 2024  2:40 PM

## 2024-10-07 LAB
PATH REPORT.COMMENTS IMP SPEC: NORMAL
PATH REPORT.COMMENTS IMP SPEC: NORMAL
PATH REPORT.FINAL DX SPEC: NORMAL
PATH REPORT.GROSS SPEC: NORMAL
PATH REPORT.MICROSCOPIC SPEC OTHER STN: NORMAL
PATH REPORT.RELEVANT HX SPEC: NORMAL
PHOTO IMAGE: NORMAL

## 2024-10-14 ENCOUNTER — OFFICE VISIT (OUTPATIENT)
Dept: OTOLARYNGOLOGY | Facility: CLINIC | Age: 44
End: 2024-10-14
Payer: COMMERCIAL

## 2024-10-14 VITALS
HEART RATE: 84 BPM | DIASTOLIC BLOOD PRESSURE: 84 MMHG | OXYGEN SATURATION: 94 % | HEIGHT: 72 IN | SYSTOLIC BLOOD PRESSURE: 136 MMHG | WEIGHT: 315 LBS | BODY MASS INDEX: 42.66 KG/M2

## 2024-10-14 DIAGNOSIS — E21.3 HYPERPARATHYROIDISM (H): Primary | ICD-10-CM

## 2024-10-14 PROCEDURE — 31575 DIAGNOSTIC LARYNGOSCOPY: CPT | Mod: 79 | Performed by: STUDENT IN AN ORGANIZED HEALTH CARE EDUCATION/TRAINING PROGRAM

## 2024-10-14 ASSESSMENT — PAIN SCALES - GENERAL: PAINLEVEL: NO PAIN (0)

## 2024-10-14 NOTE — LETTER
10/14/2024       RE: Dontrell Schulz  [address] [address]     Dear Colleague,    Thank you for referring your patient, Dontrell Schulz, to the [facility] [place] at [facility]. Please see a copy of my visit note below.    Head and neck surgery  Total 14 2004    Diagnosis: Primary hyperparathyroidism    Treatment: Parathyroidectomy with removal of right inferior parathyroid adenoma.  IntraOp PTH dropped appropriately    Pathology: Parathyroid adenoma, 0.3 g    Interval history:  He has no complaints today.    Physical examination:  Alert in no acute distress  Voice is strong  Incision healing well.  He has evidence of a seroma.  No signs of infection    Procedure:  Fiberoptic laryngoscopy was performed.  The vocal cords are mobile bilaterally.  He appears to have a small polyp on the left anterior true vocal cord that is stable from prior laryngoscopy.    Assessment and plan:  --Doing well postop  --We discussed standard wound cares  --He will follow-up with me as needed  --We have placed a referral for him to meet with one of our laryngologists for his vocal cord polyp    Aquilino Caba MD    25 minutes spent on the date of the encounter in chart review, patient visit, review of tests, documentation and/or discussion with other providers about the issues documented above aside from time spent doing flexible laryngoscopy.     Again, thank you for allowing me to participate in the care of your patient.      Sincerely,    Aquilino Caba MD

## 2024-10-14 NOTE — PATIENT INSTRUCTIONS
You were seen in the ENT Clinic today by Dr. Caba. If you have any questions or concerns after your appointment, please contact us (see below)    The following has been recommended for you based upon your appointment today:  Follow up with one of our laryngologists for vocal cord polyp     Please return to clinic as needed    How to Contact Us:  Send a [employer]s message to your provider. Our team will respond to you via [employer]. Occasionally, we will need to call you to get further information.  For urgent matters (Monday-Friday), call the ENT Clinic: [phone] and speak with a call center team member - they will route your call appropriately.   If you'd like to speak directly with a nurse, please find our contact information below. We do our best to check voicemail frequently throughout the day, and will work to call you back within 1-2 days. For urgent matters, please use the general clinic phone numbers listed above.    Gina WRIGHT RN, BSN  RN Care Coordinator, ENT Clinic  [facility]  Direct: [phone]

## 2024-10-14 NOTE — LETTER
October 14, 2024      Dontrell Schulz  [address] [id number]        To Whom It May Concern:    Dontrell Schulz was seen in our clinic on 10/14/2024 for follow up after surgery on 10/03/2024 with Dr. Caba. He may return to work on 10/15/2024 without restrictions.      Sincerely,        Aquilino Caba MD

## 2024-10-16 ENCOUNTER — TELEPHONE (OUTPATIENT)
Dept: OTOLARYNGOLOGY | Facility: CLINIC | Age: 44
End: 2024-10-16
Payer: COMMERCIAL

## 2024-10-16 NOTE — TELEPHONE ENCOUNTER
Patient returned VM regarding offered appointment with Dr. Meza for vocal cord polyp on 12/23/24 at 4:00 pm- Per  (parathyroidectomy on 10/3/24.).  Scheduled patient as offered and sent reminder letter to confirmed address.-Per Patient

## 2024-10-16 NOTE — TELEPHONE ENCOUNTER
LVM for patient regarding offered appointment with Dr. Meza for vocal cord polyp on 12/23/24 at 4:00 pm- Per  (parathyroidectomy on 10/3/24.).  Provided direct number for scheduling options.

## 2024-10-18 NOTE — TELEPHONE ENCOUNTER
FUTURE VISIT INFORMATION      FUTURE VISIT INFORMATION:  Date: 12/23/24  Time: 4PM  [place] [facility] [place]ERRAL INFORMATION:  Referring provider:  Aquilino Caba MD   Referring providers clinic:  [facility]  Reason for visit/diagnosis  Dr. Meza for vocal cord polyp on 12/23/24 at 4:00 pm- Per  (parathyroidectomy on 10/3/24.). -ok [place]-Appt Per PT     RECORDS REQUESTED FROM:       Clinic name Comments Records Status [facility] OV Aquilino Escobedo MD  EP[facility]     IMAGING  6/26/24- CT PARATHYROID   6/6/24- NM PARATHYROID  EP[facility]  PACS

## 2024-10-18 NOTE — PROGRESS NOTES
Head and neck surgery  Total 14 2004    Diagnosis: Primary hyperparathyroidism    Treatment: Parathyroidectomy with removal of right inferior parathyroid adenoma.  IntraOp PTH dropped appropriately    Pathology: Parathyroid adenoma, 0.3 g    Interval history:  He has no complaints today.    Physical examination:  Alert in no acute distress  Voice is strong  Incision healing well.  He has evidence of a seroma.  No signs of infection    Procedure:  Fiberoptic laryngoscopy was performed.  The vocal cords are mobile bilaterally.  He appears to have a small polyp on the left anterior true vocal cord that is stable from prior laryngoscopy.    Assessment and plan:  --Doing well postop  --We discussed standard wound cares  --He will follow-up with me as needed  --We have placed a referral for him to meet with one of our laryngologists for his vocal cord polyp    Aquilino Caba MD    25 minutes spent on the date of the encounter in chart review, patient visit, review of tests, documentation and/or discussion with other providers about the issues documented above aside from time spent doing flexible laryngoscopy.

## 2024-10-24 ENCOUNTER — VIRTUAL VISIT (OUTPATIENT)
Dept: FAMILY MEDICINE | Facility: OTHER | Age: 44
End: 2024-10-24
Payer: COMMERCIAL

## 2024-10-24 DIAGNOSIS — M54.16 LUMBAR RADICULOPATHY: Primary | ICD-10-CM

## 2024-10-24 PROCEDURE — 99442 PR PHYSICIAN TELEPHONE EVALUATION 11-20 MIN: CPT | Mod: 93 | Performed by: PHYSICIAN ASSISTANT

## 2024-10-24 RX ORDER — METHOCARBAMOL 750 MG/1
375-750 TABLET, FILM COATED ORAL 4 TIMES DAILY PRN
Qty: 60 TABLET | Refills: 0 | Status: SHIPPED | OUTPATIENT
Start: 2024-10-24

## 2024-10-24 RX ORDER — PREDNISONE 20 MG/1
TABLET ORAL
Qty: 11 TABLET | Refills: 0 | Status: SHIPPED | OUTPATIENT
Start: 2024-10-24

## 2024-10-24 ASSESSMENT — ENCOUNTER SYMPTOMS: BACK PAIN: 1

## 2024-10-24 NOTE — PROGRESS NOTES
Mariano is a 44 year old who is being evaluated via a billable video visit.    How would you like to obtain your AVS? Signpost  If the video visit is dropped, the invitation should be resent by: Text to cell phone: [phone]  Will anyone else be joining your video visit? No    Assessment & Plan     Lumbar radiculopathy  Patient informs me that he has been experiencing pains in the right lower back which have been radiating to the knee. He cannot recall any specific activity or point which may have triggered his pains. He has noticed that the pains are worse in the AM and seem to improve throughout the day. He has been trying to practice stretching at home, but symptoms have continued to persist. He says that he has taken muscle relaxants in the past which have helped pains like this. I will have him begin use of robaxin up to 4x/day as needed. I will also have him complete prednisone taper. I attached lower back stretches for the patient to practice at home to the AVS. He was informed that he will be able to access these through [employer].   - methocarbamol (ROBAXIN) 750 MG tablet; Take 0.5-1 tablets (375-750 mg) by mouth 4 times daily as needed for muscle spasms.  - predniSONE (DELTASONE) 20 MG tablet; 2 tabs daily x 3 days, then 1 tab daily x 3 days, then 1/2 tab daily x 3 days.    Subjective   Mariano is a 44 year old, presenting for the following health issues:  Back Pain      10/24/2024    10:35 AM   Additional Questions   Roomed by yandel   Accompanied by self     Back Pain     History of Present Illness       Back Pain:  He presents for follow up of back pain. Patient's back pain is a recurring problem.  Location of back pain:  Right lower back, right buttock and right hip  Description of back pain: burning, gnawing and shooting  Back pain spreads: right buttocks, right knee and right foot    Since patient first noticed back pain, pain is: gradually worsening  Does back pain interfere with his job:  Yes       He eats  4 or more servings of fruits and vegetables daily.He consumes 1 sweetened beverage(s) daily.He exercises with enough effort to increase his heart rate 10 to 19 minutes per day.  He exercises with enough effort to increase his heart rate 4 days per week.   He is taking medications regularly.       Review of Systems  Constitutional, HEENT, cardiovascular, pulmonary, gi and gu systems are negative, except as otherwise noted.      Objective           Vitals:  No vitals were obtained today due to virtual visit.    Physical Exam   GENERAL: alert and no distress  RESP: No audible wheeze, cough     Cranial nerves grossly intact.  Mentation and speech appropriate for age.  PSYCH: Appropriate affect, tone, and pace of words          Video-Visit Details    Type of service:  Phone Visit   Originating Location (pt. Location): Other Work vehicle  Distant Location (provider location):  On-site  Platform used for Video Visit: Telephone - 12 minutes  Signed Electronically by: Escobar Perry PA-C

## 2024-11-11 ENCOUNTER — OFFICE VISIT (OUTPATIENT)
Dept: OTOLARYNGOLOGY | Facility: CLINIC | Age: 44
End: 2024-11-11
Payer: COMMERCIAL

## 2024-11-11 VITALS
DIASTOLIC BLOOD PRESSURE: 84 MMHG | SYSTOLIC BLOOD PRESSURE: 146 MMHG | HEART RATE: 76 BPM | WEIGHT: 315 LBS | BODY MASS INDEX: 42.66 KG/M2 | TEMPERATURE: 97.1 F | OXYGEN SATURATION: 96 % | HEIGHT: 72 IN

## 2024-11-11 DIAGNOSIS — E21.3 HYPERPARATHYROIDISM (H): Primary | ICD-10-CM

## 2024-11-11 PROCEDURE — 99024 POSTOP FOLLOW-UP VISIT: CPT | Performed by: STUDENT IN AN ORGANIZED HEALTH CARE EDUCATION/TRAINING PROGRAM

## 2024-11-11 ASSESSMENT — PAIN SCALES - GENERAL: PAINLEVEL_OUTOF10: NO PAIN (0)

## 2024-11-11 NOTE — PROGRESS NOTES
Head and neck surgery  11/11/2024     Diagnosis: Primary hyperparathyroidism     Treatment: Parathyroidectomy with removal of right inferior parathyroid adenoma.  IntraOp PTH dropped appropriately     Pathology: Parathyroid adenoma, 0.3 g     Interval history:  He called in concerned about neck swelling. Heel pain and mood improving     Physical examination:  Alert in no acute distress  Voice is strong  Expected post operative swelling. No seroma.      Assessment and plan:  Doing well today. No fluid collection appreciated. He can follow up with me as needed.     Aquilino Caba MD     25 minutes spent on the date of the encounter in chart review, patient visit, review of tests, documentation and/or discussion with other providers about the issues documented above

## 2024-11-11 NOTE — PATIENT INSTRUCTIONS
You were seen in the ENT Clinic today by Dr. Caba. If you have any questions or concerns after your appointment, please contact us (see below)    Please return to clinic as needed    How to Contact Us:  Send a [employer] message to your provider. Our team will respond to you via K[employer]uan. Occasionally, we will need to call you to get further information.  For urgent matters (Monday-Friday), call the ENT Clinic: [phone] and speak with a call center team member - they will route your call appropriately.   If you'd like to speak directly with a nurse, please find our contact information below. We do our best to check voicemail frequently throughout the day, and will work to call you back within 1-2 days. For urgent matters, please use the general clinic phone numbers listed above.    Gina WRIGHT RN, BSN  RN Care Coordinator, ENT Clinic  [facility]  Direct: [phone]

## 2024-11-11 NOTE — NURSING NOTE
Chief Complaint   Patient presents with    RECHECK     Follow up     Blood pressure (!) 146/84, pulse 76, temperature 97.1  F (36.2  C), height 1.829 m (6'), weight (!) 181.9 kg (401 lb), SpO2 96%.  Sergio Soliz LPN

## 2024-11-11 NOTE — LETTER
11/11/2024       RE: Dontrell Schulz  [address] [address]     Dear Colleague,    Thank you for referring your patient, Dontrell Schulz, to the [facility] EAR NOSE AND THROAT CLINIC [place] at [facility]. Please see a copy of my visit note below.    Head and neck surgery  11/11/2024     Diagnosis: Primary hyperparathyroidism     Treatment: Parathyroidectomy with removal of right inferior parathyroid adenoma.  IntraOp PTH dropped appropriately     Pathology: Parathyroid adenoma, 0.3 g     Interval history:  He called in concerned about neck swelling. Heel pain and mood improving     Physical examination:  Alert in no acute distress  Voice is strong  Expected post operative swelling. No seroma.      Assessment and plan:  Doing well today. No fluid collection appreciated. He can follow up with me as needed.     Aquilino Caba MD     25 minutes spent on the date of the encounter in chart review, patient visit, review of tests, documentation and/or discussion with other providers about the issues documented above       Again, thank you for allowing me to participate in the care of your patient.      Sincerely,    Aquilino Caba MD

## 2024-12-23 ENCOUNTER — OFFICE VISIT (OUTPATIENT)
Dept: OTOLARYNGOLOGY | Facility: CLINIC | Age: 44
End: 2024-12-23
Payer: COMMERCIAL

## 2024-12-23 ENCOUNTER — PRE VISIT (OUTPATIENT)
Dept: OTOLARYNGOLOGY | Facility: CLINIC | Age: 44
End: 2024-12-23

## 2024-12-23 VITALS
HEIGHT: 72 IN | HEART RATE: 90 BPM | BODY MASS INDEX: 42.66 KG/M2 | OXYGEN SATURATION: 98 % | DIASTOLIC BLOOD PRESSURE: 86 MMHG | SYSTOLIC BLOOD PRESSURE: 160 MMHG | WEIGHT: 315 LBS

## 2024-12-23 DIAGNOSIS — J38.3 LESION OF VOCAL FOLD: ICD-10-CM

## 2024-12-23 DIAGNOSIS — J38.3 VOCAL CORD MASS: Primary | ICD-10-CM

## 2024-12-23 DIAGNOSIS — E21.3 HYPERPARATHYROIDISM (H): ICD-10-CM

## 2024-12-23 DIAGNOSIS — R49.0 DYSPHONIA: Primary | ICD-10-CM

## 2024-12-23 DIAGNOSIS — R49.0 DYSPHONIA: ICD-10-CM

## 2024-12-23 PROCEDURE — 99215 OFFICE O/P EST HI 40 MIN: CPT | Mod: 25 | Performed by: OTOLARYNGOLOGY

## 2024-12-23 PROCEDURE — 31579 LARYNGOSCOPY TELESCOPIC: CPT | Performed by: OTOLARYNGOLOGY

## 2024-12-23 NOTE — PROGRESS NOTES
"[facility] VOICE CLINIC  CLINICAL EVALUATION REPORT    Patient: Dontrell Schulz  Date of Service: 12/23/2024  Seen in conjunction with: Dr. Tete Meza  Referring physician: Dr. Caba    HISTORY  PATIENT INFORMATION  Mariano Schulz is a 44 year old presenting today for initial evaluation of voice and resonance. Salient details of his symptom history are as follows:    The patient has a history of dysphonia for greater than 15 years without obvious inciting event.  Symptoms have been stable over time.  He has previously been under the care of of Dr. Caba for thyroid cancer.  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  The patient has moderate voice demands working for [employer].  He is not bothered by his voice, but he and his wife do note that his voice quality is never normal.    He denies difficulties with swallowing, throat discomfort, and breathing.  He denies symptoms of reflux.        OBJECTIVE FINDINGS  PATIENT REPORTED MEASURES  Patient Supplied Answers To [facility] Voice Questionnaire       No data to display                 Patient Supplied Answers To VHI Questionnaire      12/23/2024     1:56 PM   Voice Handicap Index (VHI-10)   My voice makes it difficult for people to hear me 0   People have difficulty understanding me in a noisy room 0   My voice difficulties restrict my personal and social life.  0   I feel left out of conversations because of my voice 0   My voice problem causes me to lose income 0   I feel as though I have to strain to produce voice 0   The clarity of my voice is unpredictable 0   My voice problem upsets me 0   My voice makes me feel handicapped 0   People ask, \"What's wrong with your voice?\" 0   VHI-10 0        Patient-reported        Patient Supplied Answers To CSI Questionnaire      12/23/2024     1:56 PM   Cough Severity Index (CSI)   My cough is worse when I lie down 2   My " coughing problem causes me to restrict my personal and social life 0   I tend to avoid places because of my cough problem 0   I feel embarrassed because of my coughing problem 0   People ask, ''What's wrong?'' because I cough a lot 0   I run out of air when I cough 0   My coughing problem affects my voice 2   My coughing problem limits my physical activity 0   My coughing problem upsets me 0   People ask me if I am sick because I cough a lot 0   CSI Score 4        Patient-reported        Patient Supplied Answers To EAT Questionnaire       No data to display                  Self-Ratings as discussed with patient:       No data to display                 PERCEPTUAL EVALUATION (66443)  VOICE/ SPEECH/ NON-COMMUNICATIVE LARYNGEAL BEHAVIORS EVALUATION  Palpation of the laryngeal area shows:  firm musculature  tenderness of the thyrohyoid area  Breathing pattern:   incoordination with phonation and insufficient breath stream for duration of phonation  Cough/ Throat clear:  not observed today   Mariano states today is a typical voice day, with clinician observing voice quality characterized by:  Roughness: Moderate to severe Intermittent  Breathiness: Mild Consistent  Strain: Mild to moderate Intermittent  Habitual pitch is perceptually within normal limits and appropriate for age and gender  Loudness is WNL and is appropriate for the setting    GRADE, ROUGHNESS, BREATHINESS, ASTHENIA, STRAIN  (GRBAS) scale:  G(3)R(2)B(1)A(0)S(1)    Laryngeal Function Studies: Deferred today, as patient is not bothered by his voice quality, and is unsure whether he will pursue future intervention.    LARYNGEAL EXAMINATION  Procedure: Flexible endoscopy with chip-tip technology with stroboscopy, right nostril; topical anesthesia with 3% Lidocaine and 0.25% phenylephrine was applied.   Performed by: Dr. Tete Meza  Verbal consent was obtained and witnessed prior to this procedure.   A time-out was performed, verifying patient, procedure,  "and site.   This exam shows:  Velar Function: Not assessed  Secretions:   Intermittent collection of thickened secretions on the vocal folds  Laryngeal Mucosa: essentially healthy laryngeal mucosa  Vocal fold mucosa:    RTVF - within normal limits, no visible lesions  LTVF -broad-based, round, white, and \"rubén like in appearance\" lesion extending from the superior surface of the junction between the anterior one third and posterior two thirds of the left true vocal fold with subtle vasculature near base  Vocal fold function:   Vocal folds are mobile and meet at midline  Movement is brisk and symmetric  Exam is neurologically normal   Narrow Band Imaging (NBI) demonstrated: Findings consistent with straight light  Airway is patent  Elongation of the vocal folds for pitch increase is normal  Moderate four-way constriction of the supraglottic larynx during connected speech  Therapy probes could not be completed due to poor patient tolerance of exam  The addition of stroboscopy provided the following information:  Vibratory Behavior: Present bilaterally  Amplitude Right: Within normal limits  Amplitude Left: Within normal limits  Mucosal Wave Right: Within normal limits  Mucosal Wave Left: Mildly reduced over site of lesion  Glottic closure: Generally complete, but with intermittent hourglass shape when lesion flips inferiorly to interrupt the glottic space  Symmetry: Mostly symmetric  Periodicity: Mostly periodic     ASSESSMENT / PLAN  IMPRESSIONS: Mariano Schulz is a 44 year old with moderate to severe Dysphonia (R49.0) characterized by roughness, breathiness, and strain.  Laryngeal exam demonstrates left-sided vocal fold lesion that has been stable over the past several months.  Given stability of patient's symptoms (greater than 15 years), it is likely that the lesion has been present for several years.  At this time, the patient is not particularly bothered by his voice.  He and his wife would like to discuss " whether to intermittently monitor lesion with Dr. Meza, or to proceed to the OR for excision.  If the latter, the patient will undergo a course of perioperative speech therapy to optimize surgical outcomes.    Goals:  Patient goal:    To understand the problem and fix it as much as possible     Short-term goal(s): Within the first 4 sessions, Mariano will:  -- accurately self-identify target vs. habitual voice quality in >80% of utterances, with demonstrated ability to volitionally alter voice quality with 90% accy when prompted.  -- coordinate appropriate air flow levels with forward resonance during phonation in order to minimize laryngeal compensation and effort with 90% accy.    Long-term goal(s): In 3 months, Mariano will:  -- report a 90% resolution of voice symptoms during a week of performing typical personal, social, and professional activities.    This treatment plan was developed with the patient who agreed with the recommendations.    TOTAL SERVICE TIME: 60 minutes  EVALUATION OF VOICE AND RESONANCE (69261)  NO CHARGE FACILITY FEE (99482)    Speech recognition software may have been used in this documentation; input is reviewed before signature to the best of my ability.     Alli Camacho, Ph.D., [facility]-SLP  Speech-Language Pathologist-[facility]  [phone]  he/him/his

## 2024-12-23 NOTE — PROGRESS NOTES
Dear Dr. Caba:    I had the pleasure of meeting Dontrell Schulz in consultation at the [facility] Voice Clinic of the [facility] at your request, for evaluation of dysphonia. The note from our visit follows. Speech recognition software may have been used in the documentation below; input is reviewed before signature to the best of my ability. I appreciate the opportunity to participate in the care of this pleasant patient.    Please feel free to contact me with any questions.    Sincerely yours,    Tete Meza M.D., M.P.H.  , Laryngology  Director, [facility] Voice [facility]  Otolaryngology- Head & Neck Surgery  [phone]          =====    HISTORY OF PRESENT ILLNESS:   Dontrell Schulz is a pleasant 44 year old male with PMH including hyperparathyroidism, obesity, post-traumatic right eye blindness (since 2000), and h/o tobacco use (quit 2021) who presents with a long history of dysphonia. Symptoms include frequent throat-clearing and frequent cough.    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   -initially after parathyroid surgery, had some increased swallow effort at first, but this normalized after about a month     Cough/Throat-clearing  -frequent throat-clearing especially in the morning  -not much cough    Breathing  No concerns.     Throat discomfort  No concerns.     Reflux-type symptoms  He experiences heartburn/indigestion: never. He is not taking reflux medications.        Prior EPIC/ outside records were reviewed for this visit, including:  Dr Caba  10/14/24    MEDICATIONS:     Current Outpatient Medications   Medication Sig Dispense Refill    amLODIPine (NORVASC) 10 MG tablet Take 1 tablet (10 mg) by mouth daily (Patient taking differently: Take 10 mg by mouth every morning.) 90 tablet 3    cholecalciferol (VITAMIN D3) 25 mcg (1000 units) capsule Take 1 capsule by mouth every morning.      diclofenac (VOLTAREN) 1 % topical gel Apply 4 g topically 4 times daily 150 g 1    lisinopril (ZESTRIL) 40 MG tablet Take 1 tablet (40 mg) by mouth daily (Patient taking differently: Take 40 mg by mouth every morning.) 90 tablet 3    meloxicam (MOBIC) 15 MG tablet Take 1 tablet (15 mg) by mouth daily 30 tablet 1    methocarbamol (ROBAXIN) 750 MG tablet Take 0.5-1 tablets (375-750 mg) by mouth 4 times daily as needed for muscle spasms. 60 tablet 0    order for DME Thigh High HEIDI stockings 1 each 1    oxyCODONE (ROXICODONE) 5 MG tablet Take 1-2 tablets (5-10 mg) by mouth every 4 hours as needed for moderate to severe pain. 20 tablet 0    predniSONE (DELTASONE) 20 MG tablet 2 tabs daily x 3 days, then 1 tab daily x 3 days, then 1/2 tab daily x 3 days. 11 tablet 0    Semaglutide-Weight Management (WEGOVY) 0.25 MG/0.5ML pen Inject 0.25 mg subcutaneously once a week. 2 mL 0       ALLERGIES:    Allergies   Allergen Reactions    Nkda [No Known Drug Allergy]        PAST MEDICAL HISTORY:   Past Medical History:   Diagnosis Date    Hyperparathyroidism (H)     Hypertension     Legally blind in right eye, as defined in [place]     trauma in 2000    Morbid obesity (H)     Osteoarthritis of both knees, unspecified osteoarthritis type     Tobacco use         PAST SURGICAL HISTORY:   Past Surgical History:   Procedure Laterality Date    EYE SURGERY Right 2000    trauma related - Retinal injury.    PARATHYROIDECTOMY N/A 10/3/2024    Procedure: PARATHYROIDECTOMY;  Surgeon: Aquilino Caba MD;  Location:  OR       HABITS/SOCIAL HISTORY:    Social History     Tobacco Use    Smoking status:  Every Day     Current packs/day: 0.00     Average packs/day: 1 pack/day for 10.0 years (10.0 ttl pk-yrs)     Types: Cigarettes     Start date: 9/1/2011     Last attempt to quit: 9/1/2021     Years since quitting: 3.3    Smokeless tobacco: Never    Tobacco comments:     1-2 cigarettes daily   Substance Use Topics    Alcohol use: Not Currently     Comment: occas         FAMILY HISTORY:    Family History   Problem Relation Age of Onset    No Known Problems Mother     No Known Problems Father     Anesthesia Reaction No family hx of     Thrombosis No family hx of        REVIEW OF SYSTEMS:  Comprehensive 11 point review of systems was reviewed. Positives are as noted below; pertinent findings are as noted in the HPI.     Patient Supplied Answers to Review of Systems      7/24/2024     9:55 AM    ENT ROS   Psychology Frequently feeling anxious   Musculoskeletal Sore or stiff joints    Back pain       PHYSICAL EXAMINATION:  General: The patient was alert and conversant, and in no acute distress. Patient accompanied by his spouse.  Eyes: Left pupil reactive to light, conjunctiva and lids normal, sclera nonicteric. Right eye divergent, pt states blind in that eye  Nose: Anterior rhinoscopy: no gross abnormalities. no  bleeding; no  mucopurulence; septum grossly normal, mild mucoid drainage and/or crusting.  Oral cavity/oropharynx: No masses or lesions. Dentition in fair condition. Tongue mobility and palate elevation intact and symmetric.  Ears: Normal auricles, external auditory canals bilaterally. Visualized portions of tympanic membranes normal bilaterally.   Neck: No palpable cervical lymphadenopathy. There was mild tenderness to palpation of the thyrohyoid space, which was narrow. No obvious thyroid abnormality. Landmarks palpable. Well-healed anterior low neck incision.  Resp: Breathing comfortably, no stridor or stertor.  Neuro: Symmetric facial function. Other cranial nerves as documented above.  Psych: Normal  affect, pleasant and cooperative.  Voice/speech: Moderate dysphonia characterized by breathiness, roughness, and strain.  Extremities: No cyanosis, clubbing, or edema of the upper extremities.       PROCEDURE:   Flexible fiberoptic laryngoscopy and laryngovideostroboscopy  Indications: This procedure was warranted to evaluate the patient's laryngeal anatomy and function. Risks, benefits, and alternatives were discussed.  Description: After written informed consent was obtained, a time-out was performed to confirm patient identity, procedure, and procedure site. Topical 2% lidocaine and oxymetazoline were applied to the nasal cavities and oropharynx. I performed the endoscopy and no complications were apparent. Continuous and stroboscopic light were utilized to assess routine phonation and variable frequency phonation.  Performed by: Tete Meza MD MPH  SLP: Alli Camacho, PhD, CCC-SLP   Findings: Normal nasopharynx. Normal base of tongue, valleculae, and epiglottis. Vocal fold mobility: right: normal; left: normal. Medial edges of the true vocal folds: smooth and straight on the right overall; left with rounded smooth mid vocal fold mass. No focal mucosal lesions were otherwise observed on the true vocal folds. Glissade produced appropriate elongation. There was moderate supraglottic recruitment with connected speech. Mucosa of false vocal folds, aryepiglottic folds, piriform sinuses, and posterior glottis unremarkable. Airway was patent. Response to the therapy probes was good. Similar findings on virtual chromoendoscopy. Appearance stable compared to prior exam 10/2024 by Dr Caba.    The addition of stroboscopy allowed evaluation of the mucosal wave.   Amplitude: right: normal; left: moderately decreased. Symmetry: associated asymmetry. Closure pattern: complete. Closure plane: at glottic level. Phase distribution: normal.                                  IMPRESSION AND PLAN:   Dontrell Schulz  presents with a smooth left true vocal fold mass that appears stable compared to the prior exam in Oct 2024 by Dr Caba. The patient states that he has not had any voice changes over the past 15 years.    We discussed that excision would allow us to obtain pathology information, and may lead to some vocal improvement. We also noted that given the long chronicity of his current voice quality, it is also possible that the mass has been there for some time, and acknowledged that he does not really feel he has a voice problem, so surveillance with a follow up laryngoscopy is also an option.    We discussed the typical course for a MDL with excision of a vocal fold mass. Risks of surgery, including injury to lips/ teeth/ tongue/ jaw/ throat/ airway, risks of general anesthesia, and temporary/permanent hoarseness were discussed. This included temporary or permanent dysgeusia, hypesthesia, or motion abnormalities of the tongue. We also discussed a potential need for additional procedures in the future, as well as the risk of equivocal results from any pathology specimens that would be sent. The precise duration of voice rest following surgery will be determined at that time, but will likely be approximately 3 days. Voice/speech therapy would be an appropriate part of post-surgical rehabilitation. We talked about considerations related to vocal demand post-operatively, which can influence scheduling preferences for patients with vocal demands. He is busy with snowplow season currently.     Alternatively we discussed that if he chooses surveillance, we could plan a routine follow-up visit in clinic in several months.    He would like to consider the options and let us know by Moon how he would like to proceed. I also encouraged him to reach out if he has other questions that might need to be addressed before he can decide.    I appreciate the opportunity to participate in the care of this patient.     I spent a total of  47 minutes on 12/23/2024 in chart review, review of tests, patient visit, documentation, care coordination, and/or discussion with other providers about the issues documented above, separate from any documented procedure(s).

## 2024-12-23 NOTE — LETTER
12/23/2024      RE: Dontrell Schulz  [address] [address]       Dear Dr. Caba:    I had the pleasure of meeting Dontrell Schulz in consultation at the [facility] of the [facility] at your request, for evaluation of dysphonia. The note from our visit follows. Speech recognition software may have been used in the documentation below; input is reviewed before signature to the best of my ability. I appreciate the opportunity to participate in the care of this pleasant patient.    Please feel free to contact me with any questions.    Sincerely yours,    Tete Meza M.D., M.P.H.  , Laryngology  Director, [facility]  Otolaryngology- Head & Neck Surgery  [phone]          =====    HISTORY OF PRESENT ILLNESS:   Dontrell Schulz is a pleasant 44 year old male with PMH including hyperparathyroidism, obesity, post-traumatic right eye blindness (since 2000), and h/o tobacco use (quit 2021) who presents with a long history of dysphonia. Symptoms include frequent throat-clearing and frequent cough.    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   -initially after parathyroid surgery, had some increased swallow effort at first, but this normalized after about a month     Cough/Throat-clearing  -frequent throat-clearing especially in the morning  -not much cough    Breathing  No concerns.     Throat discomfort  No concerns.     Reflux-type symptoms  He experiences heartburn/indigestion: never. He is not taking reflux  medications.        Prior [facility]/ outside records were reviewed for this visit, including:  Dr Caba 10/14/24    MEDICATIONS:     Current Outpatient Medications   Medication Sig Dispense Refill     amLODIPine (NORVASC) 10 MG tablet Take 1 tablet (10 mg) by mouth daily (Patient taking differently: Take 10 mg by mouth every morning.) 90 tablet 3     cholecalciferol (VITAMIN D3) 25 mcg (1000 units) capsule Take 1 capsule by mouth every morning.       diclofenac (VOLTAREN) 1 % topical gel Apply 4 g topically 4 times daily 150 g 1     lisinopril (ZESTRIL) 40 MG tablet Take 1 tablet (40 mg) by mouth daily (Patient taking differently: Take 40 mg by mouth every morning.) 90 tablet 3     meloxicam (MOBIC) 15 MG tablet Take 1 tablet (15 mg) by mouth daily 30 tablet 1     methocarbamol (ROBAXIN) 750 MG tablet Take 0.5-1 tablets (375-750 mg) by mouth 4 times daily as needed for muscle spasms. 60 tablet 0     order for DME Thigh High HEIDI stockings 1 each 1     oxyCODONE (ROXICODONE) 5 MG tablet Take 1-2 tablets (5-10 mg) by mouth every 4 hours as needed for moderate to severe pain. 20 tablet 0     predniSONE (DELTASONE) 20 MG tablet 2 tabs daily x 3 days, then 1 tab daily x 3 days, then 1/2 tab daily x 3 days. 11 tablet 0     Semaglutide-Weight Management (WEGOVY) 0.25 MG/0.5ML pen Inject 0.25 mg subcutaneously once a week. 2 mL 0       ALLERGIES:    Allergies   Allergen Reactions     Nkda [No Known Drug Allergy]        PAST MEDICAL HISTORY:   Past Medical History:   Diagnosis Date     Hyperparathyroidism (H)      Hypertension      Legally blind in right eye, as defined in [place]     trauma in 2000     Morbid obesity (H)      Osteoarthritis of both knees, unspecified osteoarthritis type      Tobacco use         PAST SURGICAL HISTORY:   Past Surgical History:   Procedure Laterality Date     EYE SURGERY Right 2000    trauma related - Retinal injury.     PARATHYROIDECTOMY N/A 10/3/2024    Procedure: PARATHYROIDECTOMY;  Surgeon: Rosales  Aquilino Flores MD;  Location:  OR       HABITS/SOCIAL HISTORY:    Social History     Tobacco Use     Smoking status: Every Day     Current packs/day: 0.00     Average packs/day: 1 pack/day for 10.0 years (10.0 ttl pk-yrs)     Types: Cigarettes     Start date: 9/1/2011     Last attempt to quit: 9/1/2021     Years since quitting: 3.3     Smokeless tobacco: Never     Tobacco comments:     1-2 cigarettes daily   Substance Use Topics     Alcohol use: Not Currently     Comment: occas         FAMILY HISTORY:    Family History   Problem Relation Age of Onset     No Known Problems Mother      No Known Problems Father      Anesthesia Reaction No family hx of      Thrombosis No family hx of        REVIEW OF SYSTEMS:  Comprehensive 11 point review of systems was reviewed. Positives are as noted below; pertinent findings are as noted in the HPI.     Patient Supplied Answers to Review of Systems      7/24/2024     9:55 AM    ENT ROS   Psychology Frequently feeling anxious   Musculoskeletal Sore or stiff joints    Back pain       PHYSICAL EXAMINATION:  General: The patient was alert and conversant, and in no acute distress. Patient accompanied by his spouse.  Eyes: Left pupil reactive to light, conjunctiva and lids normal, sclera nonicteric. Right eye divergent, pt states blind in that eye  Nose: Anterior rhinoscopy: no gross abnormalities. no  bleeding; no  mucopurulence; septum grossly normal, mild mucoid drainage and/or crusting.  Oral cavity/oropharynx: No masses or lesions. Dentition in fair condition. Tongue mobility and palate elevation intact and symmetric.  Ears: Normal auricles, external auditory canals bilaterally. Visualized portions of tympanic membranes normal bilaterally.   Neck: No palpable cervical lymphadenopathy. There was mild tenderness to palpation of the thyrohyoid space, which was narrow. No obvious thyroid abnormality. Landmarks palpable. Well-healed anterior low neck incision.  Resp: Breathing  comfortably, no stridor or stertor.  Neuro: Symmetric facial function. Other cranial nerves as documented above.  Psych: Normal affect, pleasant and cooperative.  Voice/speech: Moderate dysphonia characterized by breathiness, roughness, and strain.  Extremities: No cyanosis, clubbing, or edema of the upper extremities.       PROCEDURE:   Flexible fiberoptic laryngoscopy and laryngovideostroboscopy  Indications: This procedure was warranted to evaluate the patient's laryngeal anatomy and function. Risks, benefits, and alternatives were discussed.  Description: After written informed consent was obtained, a time-out was performed to confirm patient identity, procedure, and procedure site. Topical 2% lidocaine and oxymetazoline were applied to the nasal cavities and oropharynx. I performed the endoscopy and no complications were apparent. Continuous and stroboscopic light were utilized to assess routine phonation and variable frequency phonation.  Performed by: Tete Meza MD MPH  SLP: Alli Camacho, PhD, CCC-SLP   Findings: Normal nasopharynx. Normal base of tongue, valleculae, and epiglottis. Vocal fold mobility: right: normal; left: normal. Medial edges of the true vocal folds: smooth and straight on the right overall; left with rounded smooth mid vocal fold mass. No focal mucosal lesions were otherwise observed on the true vocal folds. Glissade produced appropriate elongation. There was moderate supraglottic recruitment with connected speech. Mucosa of false vocal folds, aryepiglottic folds, piriform sinuses, and posterior glottis unremarkable. Airway was patent. Response to the therapy probes was good. Similar findings on virtual chromoendoscopy. Appearance stable compared to prior exam 10/2024 by Dr Caba.    The addition of stroboscopy allowed evaluation of the mucosal wave.   Amplitude: right: normal; left: moderately decreased. Symmetry: associated asymmetry. Closure pattern: complete. Closure  plane: at glottic level. Phase distribution: normal.                                  IMPRESSION AND PLAN:   Dontrell Schulz presents with a smooth left true vocal fold mass that appears stable compared to the prior exam in Oct 2024 by Dr Caba. The patient states that he has not had any voice changes over the past 15 years.    We discussed that excision would allow us to obtain pathology information, and may lead to some vocal improvement. We also noted that given the long chronicity of his current voice quality, it is also possible that the mass has been there for some time, and acknowledged that he does not really feel he has a voice problem, so surveillance with a follow up laryngoscopy is also an option.    We discussed the typical course for a MDL with excision of a vocal fold mass. Risks of surgery, including injury to lips/ teeth/ tongue/ jaw/ throat/ airway, risks of general anesthesia, and temporary/permanent hoarseness were discussed. This included temporary or permanent dysgeusia, hypesthesia, or motion abnormalities of the tongue. We also discussed a potential need for additional procedures in the future, as well as the risk of equivocal results from any pathology specimens that would be sent. The precise duration of voice rest following surgery will be determined at that time, but will likely be approximately 3 days. Voice/speech therapy would be an appropriate part of post-surgical rehabilitation. We talked about considerations related to vocal demand post-operatively, which can influence scheduling preferences for patients with vocal demands. He is busy with snowplow season currently.     Alternatively we discussed that if he chooses surveillance, we could plan a routine follow-up visit in clinic in several months.    He would like to consider the options and let us know by Moon how he would like to proceed. I also encouraged him to reach out if he has other questions that might need to be  addressed before he can decide.    I appreciate the opportunity to participate in the care of this patient.     I spent a total of 47 minutes on 12/23/2024 in chart review, review of tests, patient visit, documentation, care coordination, and/or discussion with other providers about the issues documented above, separate from any documented procedure(s).    Tete Meza MD

## 2025-02-17 ENCOUNTER — OFFICE VISIT (OUTPATIENT)
Dept: ORTHOPEDICS | Facility: CLINIC | Age: 45
End: 2025-02-17
Payer: COMMERCIAL

## 2025-02-17 VITALS — TEMPERATURE: 98.1 F | BODY MASS INDEX: 55.2 KG/M2 | WEIGHT: 315 LBS

## 2025-02-17 DIAGNOSIS — M17.11 PRIMARY OSTEOARTHRITIS OF RIGHT KNEE: Primary | ICD-10-CM

## 2025-02-17 PROCEDURE — 20610 DRAIN/INJ JOINT/BURSA W/O US: CPT | Mod: RT | Performed by: PHYSICIAN ASSISTANT

## 2025-02-17 RX ORDER — TRIAMCINOLONE ACETONIDE 40 MG/ML
40 INJECTION, SUSPENSION INTRA-ARTICULAR; INTRAMUSCULAR
Status: COMPLETED | OUTPATIENT
Start: 2025-02-17 | End: 2025-02-17

## 2025-02-17 RX ORDER — BUPIVACAINE HYDROCHLORIDE 5 MG/ML
3 INJECTION, SOLUTION PERINEURAL
Status: COMPLETED | OUTPATIENT
Start: 2025-02-17 | End: 2025-02-17

## 2025-02-17 RX ORDER — TRIAMCINOLONE ACETONIDE 40 MG/ML
80 INJECTION, SUSPENSION INTRA-ARTICULAR; INTRAMUSCULAR
Status: COMPLETED | OUTPATIENT
Start: 2025-02-17 | End: 2025-02-17

## 2025-02-17 RX ADMIN — TRIAMCINOLONE ACETONIDE 40 MG: 40 INJECTION, SUSPENSION INTRA-ARTICULAR; INTRAMUSCULAR at 16:13

## 2025-02-17 RX ADMIN — TRIAMCINOLONE ACETONIDE 80 MG: 40 INJECTION, SUSPENSION INTRA-ARTICULAR; INTRAMUSCULAR at 16:13

## 2025-02-17 RX ADMIN — BUPIVACAINE HYDROCHLORIDE 3 ML: 5 INJECTION, SOLUTION PERINEURAL at 16:13

## 2025-02-17 ASSESSMENT — PAIN SCALES - GENERAL: PAINLEVEL_OUTOF10: MODERATE PAIN (6)

## 2025-02-17 NOTE — PROGRESS NOTES
Office Visit-Follow up    Chief Complaint: Dontrell Schulz is a 44 year old male who is being seen for   Chief Complaint   Patient presents with    RECHECK     Osteoarthritis of right knee       History of Present Illness:   Patient is here for follow-up steroid injection.  Patient was last seen on 8/12/2024 and at that time we proceeded with a right knee steroid injection which lasted 3-1/2 months.  Prior to that patient had a steroid injection on 2/13/2024 into the right knee which lasted 6 months.  Patient has been utilizing Mobic and Voltaren and both have been helping him.  We have talked him about weight loss.  Patient has been using stationary bike and sometimes takes ibuprofen since he is out of Mobic.    Physical Exam:  Vitals: Temp 98.1  F (36.7  C) (Temporal)   Wt (!) 184.6 kg (407 lb)   BMI 55.20 kg/m    BMI= Body mass index is 55.2 kg/m .  Constitutional: healthy, alert and no acute distress   Psychiatric: mentation appears normal and affect normal/bright    MUSCULOSKELETAL:  No abrasions no lesions no ecchymosis no effusion     Impression: 1. Right knee primary osteoarthritis, moderate to severe lateral and mild patellofemoral.      Plan:    Large Joint Injection/Arthocentesis: R knee joint    Date/Time: 2/17/2025 4:13 PM    Performed by: Caden Mcnally PA-C  Authorized by: Caden Mcnally PA-C    Indications:  Pain  Needle Size:  22 G  Guidance: landmark guided    Approach:  Anterolateral  Location:  Knee      Medications:  80 mg triamcinolone 40 MG/ML; 3 mL BUPivacaine 0.5 %; 40 mg triamcinolone 40 MG/ML  Aspirate amount (mL):  0  Procedure discussed: discussed risks, benefits, and alternatives    Consent Given by:  Patient  Timeout: timeout called immediately prior to procedure    Prep: patient was prepped and draped in usual sterile fashion     The skin was prepped with betadine. The patient was in a seated position. I used jennifer chloride spray prior to doing the injection.  The patient  tolerated the injection well, and there were no complications. The injection site was covered with a Band-Aid.       FOCUSED PLAN:    Patient received about 3-1/2 months of relief from his right knee steroid injection done 8/12/2024 by myself.  He feels he should come in sooner and he could in the future.  He is taking Voltaren gel and has run out of Mobic but uses ibuprofen.  He is starting to think that he is getting closer to wanting his knee replaced but wants to try hyaluronic acid injection first.  I do believe this injection may not give him as much relief as would like due to the severity of his arthritis but we will try it.  We also discussed again weight loss and swimming.  If he were to have a knee replacement he would need to get his BMI to 40 and he is at 55 now.  We will need to discuss this more further as he gets more serious about knee replacement surgery.  Follow-up as needed    Re-x-ray on return: No      This note was dictated with Dash.    Caden Mcnally PA-C

## 2025-02-17 NOTE — LETTER
2/17/2025      Dontrell Schulz  [address] [facility] [address]      Dear Colleague,    Thank you for referring your patient, Dontrell Schulz, to the [facility]. Please see a copy of my visit note below.    Office Visit-Follow up    Chief Complaint: Dontrell Schulz is a 44 year old male who is being seen for   Chief Complaint   Patient presents with     RECHECK     Osteoarthritis of right knee       History of Present Illness:   Patient is here for follow-up steroid injection.  Patient was last seen on 8/12/2024 and at that time we proceeded with a right knee steroid injection which lasted 3-1/2 months.  Prior to that patient had a steroid injection on 2/13/2024 into the right knee which lasted 6 months.  Patient has been utilizing Mobic and Voltaren and both have been helping him.  We have talked him about weight loss.  Patient has been using stationary bike and sometimes takes ibuprofen since he is out of Mobic.    Physical Exam:  Vitals: Temp 98.1  F (36.7  C) (Temporal)   Wt (!) 184.6 kg (407 lb)   BMI 55.20 kg/m    BMI= Body mass index is 55.2 kg/m .  Constitutional: healthy, alert and no acute distress   Psychiatric: mentation appears normal and affect normal/bright    MUSCULOSKELETAL:  No abrasions no lesions no ecchymosis no effusion     Impression: 1. Right knee primary osteoarthritis, moderate to severe lateral and mild patellofemoral.      Plan:    Large Joint Injection/Arthocentesis: R knee joint    Date/Time: 2/17/2025 4:13 PM    Performed by: Caden Mcnally PA-C  Authorized by: Caden Mcnally PA-C    Indications:  Pain  Needle Size:  22 G  Guidance: landmark guided    Approach:  Anterolateral  Location:  Knee      Medications:  80 mg triamcinolone 40 MG/ML; 3 mL BUPivacaine 0.5 %; 40 mg triamcinolone 40 MG/ML  Aspirate amount (mL):  0  Procedure discussed: discussed risks, benefits, and alternatives    Consent Given by:  Patient  Timeout: timeout called immediately  prior to procedure    Prep: patient was prepped and draped in usual sterile fashion     The skin was prepped with betadine. The patient was in a seated position. I used jennifer chloride spray prior to doing the injection.  The patient tolerated the injection well, and there were no complications. The injection site was covered with a Band-Aid.       FOCUSED PLAN:    Patient received about 3-1/2 months of relief from his right knee steroid injection done 8/12/2024 by myself.  He feels he should come in sooner and he could in the future.  He is taking Voltaren gel and has run out of Mobic but uses ibuprofen.  He is starting to think that he is getting closer to wanting his knee replaced but wants to try hyaluronic acid injection first.  I do believe this injection may not give him as much relief as would like due to the severity of his arthritis but we will try it.  We also discussed again weight loss and swimming.  If he were to have a knee replacement he would need to get his BMI to 40 and he is at 55 now.  We will need to discuss this more further as he gets more serious about knee replacement surgery.  Follow-up as needed    Re-x-ray on return: No      This note was dictated with [employer].    Caden Mcnally PA-C        Again, thank you for allowing me to participate in the care of your patient.        Sincerely,        Caden Mcnally PA-C    Electronically signed

## 2025-03-13 ENCOUNTER — TELEPHONE (OUTPATIENT)
Dept: FAMILY MEDICINE | Facility: OTHER | Age: 45
End: 2025-03-13
Payer: COMMERCIAL

## 2025-03-13 NOTE — TELEPHONE ENCOUNTER
Patient Quality Outreach    Patient is due for the following:   Hypertension -  BP check and Hypertension follow-up visit    Action(s) Taken:   Schedule a office visit for blood pressure follow-up. Due by 04/2025 per Dio's last in office visit note.    Type of outreach:    Sent Zyncdt message.    Questions for provider review:    None           yCnthia Fink, CMA

## 2025-05-02 PROBLEM — G37.3 TRANSVERSE MYELITIS (H): Status: RESOLVED | Noted: 2017-10-31 | Resolved: 2025-05-02

## 2025-05-05 ENCOUNTER — PATIENT OUTREACH (OUTPATIENT)
Dept: CARE COORDINATION | Facility: CLINIC | Age: 45
End: 2025-05-05
Payer: COMMERCIAL

## 2025-05-07 ENCOUNTER — PATIENT OUTREACH (OUTPATIENT)
Dept: CARE COORDINATION | Facility: CLINIC | Age: 45
End: 2025-05-07
Payer: COMMERCIAL

## 2025-05-21 NOTE — TELEPHONE ENCOUNTER
Attempted to reach patient, unable to leave voicemail, will try at a later time       Billie Lewis ~ Patient Representative  [address] [address]  [email or web address]  [email or web address]  Office:  [phone]  Fax:  [phone]         RN attempted to call patient to obtain home blood pressure readings. LVM for patient to return the call.

## 2025-06-10 ENCOUNTER — OFFICE VISIT (OUTPATIENT)
Dept: ORTHOPEDICS | Facility: CLINIC | Age: 45
End: 2025-06-10
Payer: COMMERCIAL

## 2025-06-10 VITALS — BODY MASS INDEX: 42.66 KG/M2 | WEIGHT: 315 LBS | TEMPERATURE: 96.6 F | HEIGHT: 72 IN

## 2025-06-10 DIAGNOSIS — M17.11 PRIMARY OSTEOARTHRITIS OF RIGHT KNEE: Primary | ICD-10-CM

## 2025-06-10 PROCEDURE — 20610 DRAIN/INJ JOINT/BURSA W/O US: CPT | Mod: RT | Performed by: PHYSICIAN ASSISTANT

## 2025-06-10 PROCEDURE — 1125F AMNT PAIN NOTED PAIN PRSNT: CPT | Performed by: PHYSICIAN ASSISTANT

## 2025-06-10 RX ORDER — TRIAMCINOLONE ACETONIDE 40 MG/ML
40 INJECTION, SUSPENSION INTRA-ARTICULAR; INTRAMUSCULAR
Status: COMPLETED | OUTPATIENT
Start: 2025-06-10 | End: 2025-06-10

## 2025-06-10 RX ORDER — BUPIVACAINE HYDROCHLORIDE 5 MG/ML
3 INJECTION, SOLUTION PERINEURAL
Status: COMPLETED | OUTPATIENT
Start: 2025-06-10 | End: 2025-06-10

## 2025-06-10 RX ADMIN — BUPIVACAINE HYDROCHLORIDE 3 ML: 5 INJECTION, SOLUTION PERINEURAL at 16:14

## 2025-06-10 RX ADMIN — TRIAMCINOLONE ACETONIDE 40 MG: 40 INJECTION, SUSPENSION INTRA-ARTICULAR; INTRAMUSCULAR at 16:14

## 2025-06-10 ASSESSMENT — PAIN SCALES - GENERAL: PAINLEVEL_OUTOF10: MILD PAIN (2)

## 2025-06-10 NOTE — PROGRESS NOTES
Office Visit-Follow up    Chief Complaint: Dontrell Schulz is a 45 year old male who is being seen for   Chief Complaint   Patient presents with    RECHECK       Osteoarthritis of right knee           History of Present Illness:   Patient is here for follow-up steroid injection.  He was last seen on 2/17/2025 and at that time we proceeded with the right knee steroid injection which lasted 3 months and probably still working.Prior to that he was seen on 8/12/2020 for and received a steroid injection into the right knee that lasted 3-1/2 months.  He had an injection in his right knee on 2/13/2024 that was a steroid injection and lasted 6 months.  Patient utilizes Mobic and Voltaren gel which does help and we have talked about weight loss and he i has used stationary bike and uses ibuprofen when he does not have Mobic.  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    Physical Exam:  Vitals: Temp (!) 96.6  F (35.9  C) (Temporal)   Ht 1.829 m (6')   Wt (!) 181.4 kg (400 lb)   BMI 54.25 kg/m    BMI= Body mass index is 54.25 kg/m .  Constitutional: healthy, alert and no acute distress   Psychiatric: mentation appears normal and affect normal/bright    MUSCULOSKELETAL:  No abrasions no lesions no ecchymosis no effusion.    Impression: 1. Right knee primary osteoarthritis, moderate to severe lateral and mild patellofemoral.         Plan:    Large Joint Injection/Arthocentesis: R knee joint    Date/Time: 6/10/2025 4:14 PM    Performed by: Caden Mcnally PA-C  Authorized by: Caden Mcnally PA-C    Indications:  Pain  Needle Size:  22 G  Guidance: landmark guided    Approach:  Anterolateral  Location:  Knee      Medications:  3 mL BUPivacaine 0.5 %; 40 mg triamcinolone 40 MG/ML  Aspirate amount (mL):  0  Procedure discussed: discussed risks, benefits, and alternatives    Consent Given by:  Patient  Timeout: timeout called immediately prior to procedure    Prep: patient was prepped  and draped in usual sterile fashion     The skin was prepped with betadine. The patient was in a seated position. I used jennifer chloride spray prior to doing the injection.  The patient tolerated the injection well, and there were no complications. The injection site was covered with a Band-Aid.       FOCUSED PLAN:    Patient was last steroid injection done on 2/17/2025 by myself into the right knee lasted 3 months and actually still seems to be working a little bit.  We proceeded with a another right knee steroid injection today.  We encouraged him to continue taking his Mobic which he has not taken that much recently and he is using Voltaren gel every morning which is helping.  Encouraged him to work on weight loss with swimming and stationary bike which she has used in the past.  Patient is taking an over-the-counter NSAID that he cannot remember the name of as needed.  Patient can follow-up on an as-needed basis.    Re-x-ray on return: No      This note was dictated with [employer].    Caden Mcnally PA-C

## 2025-06-10 NOTE — LETTER
6/10/2025      Dontrell Schulz  [address] [facility] [address]      Dear Colleague,    Thank you for referring your patient, Dontrell Schulz, to the [facility]. Please see a copy of my visit note below.    Office Visit-Follow up    Chief Complaint: Dontrell Schulz is a 45 year old male who is being seen for   Chief Complaint   Patient presents with     RECHECK       Osteoarthritis of right knee           History of Present Illness:   Patient is here for follow-up steroid injection.  He was last seen on 2/17/2025 and at that time we proceeded with the right knee steroid injection which lasted 3 months and probably still working.Prior to that he was seen on 8/12/2020 for and received a steroid injection into the right knee that lasted 3-1/2 months.  He had an injection in his right knee on 2/13/2024 that was a steroid injection and lasted 6 months.  Patient utilizes Mobic and Voltaren gel which does help and we have talked about weight loss and he i has used stationary bike and uses ibuprofen when he does not have Mobic.  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    Physical Exam:  Vitals: Temp (!) 96.6  F (35.9  C) (Temporal)   Ht 1.829 m (6')   Wt (!) 181.4 kg (400 lb)   BMI 54.25 kg/m    BMI= Body mass index is 54.25 kg/m .  Constitutional: healthy, alert and no acute distress   Psychiatric: mentation appears normal and affect normal/bright    MUSCULOSKELETAL:  No abrasions no lesions no ecchymosis no effusion.    Impression: 1. Right knee primary osteoarthritis, moderate to severe lateral and mild patellofemoral.         Plan:    Large Joint Injection/Arthocentesis: R knee joint    Date/Time: 6/10/2025 4:14 PM    Performed by: Caden Mcnally PA-C  Authorized by: Caden Mcnally PA-C    Indications:  Pain  Needle Size:  22 G  Guidance: landmark guided    Approach:  Anterolateral  Location:  Knee      Medications:  3 mL BUPivacaine 0.5 %;  40 mg triamcinolone 40 MG/ML  Aspirate amount (mL):  0  Procedure discussed: discussed risks, benefits, and alternatives    Consent Given by:  Patient  Timeout: timeout called immediately prior to procedure    Prep: patient was prepped and draped in usual sterile fashion     The skin was prepped with betadine. The patient was in a seated position. I used jennifer chloride spray prior to doing the injection.  The patient tolerated the injection well, and there were no complications. The injection site was covered with a Band-Aid.       FOCUSED PLAN:    Patient was last steroid injection done on 2/17/2025 by myself into the right knee lasted 3 months and actually still seems to be working a little bit.  We proceeded with a another right knee steroid injection today.  We encouraged him to continue taking his Mobic which he has not taken that much recently and he is using Voltaren gel every morning which is helping.  Encouraged him to work on weight loss with swimming and stationary bike which she has used in the past.  Patient is taking an over-the-counter NSAID that he cannot remember the name of as needed.  Patient can follow-up on an as-needed basis.    Re-x-ray on return: No      This note was dictated with [employer].    Caden Mcnally PA-C      Again, thank you for allowing me to participate in the care of your patient.        Sincerely,        Caden Mcnally PA-C    Electronically signed

## 2025-06-21 ENCOUNTER — HEALTH MAINTENANCE LETTER (OUTPATIENT)
Age: 45
End: 2025-06-21

## (undated) DEVICE — CLIP HORIZON SM RED WIDE SLOT 001201

## (undated) DEVICE — CLIP HORIZON MED BLUE 002200

## (undated) DEVICE — SU SILK 3-0 TIE 12X30" A304H

## (undated) DEVICE — STRAP UNIVERSAL POSITIONING 2-PIECE 4X47X76" 91-287

## (undated) DEVICE — SU VICRYL 3-0 SH 8X18" UND J864D

## (undated) DEVICE — LINEN TOWEL PACK X5 5464

## (undated) DEVICE — SYR 10ML LL W/O NDL 302995

## (undated) DEVICE — PACK NEURO MINOR UMMC SNE32MNMU4

## (undated) DEVICE — NDL ECLIPSE 23GA 1" 305762

## (undated) DEVICE — SPONGE LAP 18X18" X8435

## (undated) DEVICE — SPONGE KITTNER 30-101

## (undated) DEVICE — SU SILK 2-0 TIE 12X30" A305H

## (undated) DEVICE — NIM PROBE NS STD INCR PRASS TIP STRL LF DISP 8225825X

## (undated) DEVICE — ESU GROUND PAD ADULT W/CORD E7507

## (undated) DEVICE — ESU FCP BIPOLAR NONSTICK STR 4"X0.4MM W/CORD 19-3002AU

## (undated) DEVICE — DRSG TELFA 3X8" 1238

## (undated) DEVICE — SU MONOCRYL 4-0 P-3 18" UND Y494G

## (undated) DEVICE — DRAPE SHEET REV FOLD 3/4 9349

## (undated) DEVICE — SU SILK 4-0 TIE 12X30" A303H

## (undated) DEVICE — GLOVE BIOGEL PI MICRO SZ 7.5 48575

## (undated) DEVICE — SU SILK 2-0 SH CR 8X18" C012D

## (undated) DEVICE — RETR ELASTIC STAYS LONE STAR BLUNT DUAL LEAD 3550-1G

## (undated) DEVICE — LINEN TOWEL PACK X6 WHITE 5487

## (undated) DEVICE — DRSG STERI STRIP 1/2X4" R1547

## (undated) RX ORDER — FENTANYL CITRATE-0.9 % NACL/PF 10 MCG/ML
PLASTIC BAG, INJECTION (ML) INTRAVENOUS
Status: DISPENSED
Start: 2024-10-03

## (undated) RX ORDER — OXYCODONE HYDROCHLORIDE 5 MG/1
TABLET ORAL
Status: DISPENSED
Start: 2024-10-03

## (undated) RX ORDER — FENTANYL CITRATE 50 UG/ML
INJECTION, SOLUTION INTRAMUSCULAR; INTRAVENOUS
Status: DISPENSED
Start: 2024-10-03

## (undated) RX ORDER — HYDROMORPHONE HYDROCHLORIDE 1 MG/ML
INJECTION, SOLUTION INTRAMUSCULAR; INTRAVENOUS; SUBCUTANEOUS
Status: DISPENSED
Start: 2024-10-03

## (undated) RX ORDER — ONDANSETRON 2 MG/ML
INJECTION INTRAMUSCULAR; INTRAVENOUS
Status: DISPENSED
Start: 2024-10-03

## (undated) RX ORDER — EPHEDRINE SULFATE 50 MG/ML
INJECTION, SOLUTION INTRAMUSCULAR; INTRAVENOUS; SUBCUTANEOUS
Status: DISPENSED
Start: 2024-10-03

## (undated) RX ORDER — GLYCOPYRROLATE 0.2 MG/ML
INJECTION, SOLUTION INTRAMUSCULAR; INTRAVENOUS
Status: DISPENSED
Start: 2024-10-03

## (undated) RX ORDER — PROPOFOL 10 MG/ML
INJECTION, EMULSION INTRAVENOUS
Status: DISPENSED
Start: 2024-10-03

## (undated) RX ORDER — DEXAMETHASONE SODIUM PHOSPHATE 4 MG/ML
INJECTION, SOLUTION INTRA-ARTICULAR; INTRALESIONAL; INTRAMUSCULAR; INTRAVENOUS; SOFT TISSUE
Status: DISPENSED
Start: 2024-10-03

## (undated) RX ORDER — ACETAMINOPHEN 325 MG/1
TABLET ORAL
Status: DISPENSED
Start: 2024-10-03